# Patient Record
Sex: FEMALE | Race: WHITE | NOT HISPANIC OR LATINO | Employment: OTHER | URBAN - METROPOLITAN AREA
[De-identification: names, ages, dates, MRNs, and addresses within clinical notes are randomized per-mention and may not be internally consistent; named-entity substitution may affect disease eponyms.]

---

## 2023-03-15 DIAGNOSIS — R53.82 CHRONIC FATIGUE, UNSPECIFIED: ICD-10-CM

## 2023-03-15 DIAGNOSIS — R06.02 SHORTNESS OF BREATH: Primary | ICD-10-CM

## 2023-03-16 ENCOUNTER — OFFICE VISIT (OUTPATIENT)
Dept: CARDIOLOGY | Facility: CLINIC | Age: 73
End: 2023-03-16
Attending: INTERNAL MEDICINE
Payer: MEDICARE

## 2023-03-16 ENCOUNTER — LAB (OUTPATIENT)
Dept: LAB | Facility: CLINIC | Age: 73
End: 2023-03-16
Payer: MEDICARE

## 2023-03-16 VITALS
DIASTOLIC BLOOD PRESSURE: 73 MMHG | BODY MASS INDEX: 29.33 KG/M2 | OXYGEN SATURATION: 97 % | SYSTOLIC BLOOD PRESSURE: 126 MMHG | HEART RATE: 100 BPM | WEIGHT: 145.5 LBS | HEIGHT: 59 IN

## 2023-03-16 DIAGNOSIS — R06.02 SHORTNESS OF BREATH: ICD-10-CM

## 2023-03-16 DIAGNOSIS — R06.02 SOB (SHORTNESS OF BREATH): Primary | ICD-10-CM

## 2023-03-16 DIAGNOSIS — R93.1 DECREASED CARDIAC EJECTION FRACTION: ICD-10-CM

## 2023-03-16 DIAGNOSIS — R53.82 CHRONIC FATIGUE, UNSPECIFIED: ICD-10-CM

## 2023-03-16 LAB
ANION GAP SERPL CALCULATED.3IONS-SCNC: 12 MMOL/L (ref 7–15)
BI-PLANE LVEF ECHO: NORMAL
BUN SERPL-MCNC: 20.1 MG/DL (ref 8–23)
CALCIUM SERPL-MCNC: 10.1 MG/DL (ref 8.8–10.2)
CHLORIDE SERPL-SCNC: 107 MMOL/L (ref 98–107)
CREAT SERPL-MCNC: 0.89 MG/DL (ref 0.51–0.95)
DEPRECATED HCO3 PLAS-SCNC: 24 MMOL/L (ref 22–29)
ERYTHROCYTE [DISTWIDTH] IN BLOOD BY AUTOMATED COUNT: 14.7 % (ref 10–15)
GFR SERPL CREATININE-BSD FRML MDRD: 68 ML/MIN/1.73M2
GLUCOSE SERPL-MCNC: 147 MG/DL (ref 70–99)
HCT VFR BLD AUTO: 42.6 % (ref 35–47)
HGB BLD-MCNC: 13.4 G/DL (ref 11.7–15.7)
LVEF ECHO: NORMAL
MCH RBC QN AUTO: 30 PG (ref 26.5–33)
MCHC RBC AUTO-ENTMCNC: 31.5 G/DL (ref 31.5–36.5)
MCV RBC AUTO: 95 FL (ref 78–100)
NT-PROBNP SERPL-MCNC: 2850 PG/ML (ref 0–900)
PLATELET # BLD AUTO: 295 10E3/UL (ref 150–450)
POTASSIUM SERPL-SCNC: 4.5 MMOL/L (ref 3.4–5.3)
RBC # BLD AUTO: 4.47 10E6/UL (ref 3.8–5.2)
SODIUM SERPL-SCNC: 143 MMOL/L (ref 136–145)
TSH SERPL DL<=0.005 MIU/L-ACNC: 0.64 UIU/ML (ref 0.3–4.2)
WBC # BLD AUTO: 8.4 10E3/UL (ref 4–11)

## 2023-03-16 PROCEDURE — G0463 HOSPITAL OUTPT CLINIC VISIT: HCPCS | Mod: 25 | Performed by: INTERNAL MEDICINE

## 2023-03-16 PROCEDURE — 84443 ASSAY THYROID STIM HORMONE: CPT | Performed by: PATHOLOGY

## 2023-03-16 PROCEDURE — 83880 ASSAY OF NATRIURETIC PEPTIDE: CPT | Performed by: PATHOLOGY

## 2023-03-16 PROCEDURE — 93005 ELECTROCARDIOGRAM TRACING: CPT

## 2023-03-16 PROCEDURE — 36415 COLL VENOUS BLD VENIPUNCTURE: CPT | Performed by: PATHOLOGY

## 2023-03-16 PROCEDURE — 99207 PR STATISTIC IV PUSH SINGLE INITIAL SUBSTANCE: CPT | Mod: GC | Performed by: INTERNAL MEDICINE

## 2023-03-16 PROCEDURE — 85027 COMPLETE CBC AUTOMATED: CPT | Performed by: PATHOLOGY

## 2023-03-16 PROCEDURE — 93306 TTE W/DOPPLER COMPLETE: CPT | Mod: GC | Performed by: INTERNAL MEDICINE

## 2023-03-16 PROCEDURE — 80048 BASIC METABOLIC PNL TOTAL CA: CPT | Performed by: PATHOLOGY

## 2023-03-16 PROCEDURE — 99204 OFFICE O/P NEW MOD 45 MIN: CPT | Mod: 25 | Performed by: INTERNAL MEDICINE

## 2023-03-16 RX ORDER — ATORVASTATIN CALCIUM 10 MG/1
10 TABLET, FILM COATED ORAL DAILY
Qty: 90 TABLET | Refills: 3 | Status: SHIPPED | OUTPATIENT
Start: 2023-03-16 | End: 2023-08-29

## 2023-03-16 RX ORDER — FUROSEMIDE 20 MG
20 TABLET ORAL DAILY
Qty: 90 TABLET | Refills: 1 | Status: SHIPPED | OUTPATIENT
Start: 2023-03-16 | End: 2023-06-09

## 2023-03-16 RX ORDER — SODIUM CHLORIDE 9 MG/ML
INJECTION, SOLUTION INTRAVENOUS CONTINUOUS
Status: CANCELLED | OUTPATIENT
Start: 2023-03-16

## 2023-03-16 RX ORDER — LISINOPRIL 2.5 MG/1
2.5 TABLET ORAL DAILY
Qty: 90 TABLET | Refills: 3 | Status: SHIPPED | OUTPATIENT
Start: 2023-03-16 | End: 2023-04-05

## 2023-03-16 RX ORDER — POTASSIUM CHLORIDE 1500 MG/1
40 TABLET, EXTENDED RELEASE ORAL
Status: CANCELLED | OUTPATIENT
Start: 2023-03-16

## 2023-03-16 RX ORDER — CALCIUM CARBONATE/VITAMIN D3 500 MG-10
TABLET ORAL
COMMUNITY

## 2023-03-16 RX ORDER — ASPIRIN 81 MG/1
TABLET, CHEWABLE ORAL
COMMUNITY
Start: 2023-03-10

## 2023-03-16 RX ORDER — METOPROLOL SUCCINATE 50 MG/1
25 TABLET, EXTENDED RELEASE ORAL
COMMUNITY
Start: 2023-03-13 | End: 2023-07-11 | Stop reason: DRUGHIGH

## 2023-03-16 RX ORDER — ASPIRIN 81 MG/1
243 TABLET, CHEWABLE ORAL ONCE
Status: CANCELLED | OUTPATIENT
Start: 2023-03-16

## 2023-03-16 RX ORDER — ASPIRIN 325 MG
325 TABLET ORAL ONCE
Status: CANCELLED | OUTPATIENT
Start: 2023-03-16 | End: 2023-03-16

## 2023-03-16 RX ORDER — LIDOCAINE 40 MG/G
CREAM TOPICAL
Status: CANCELLED | OUTPATIENT
Start: 2023-03-16

## 2023-03-16 RX ORDER — POTASSIUM CHLORIDE 1500 MG/1
20 TABLET, EXTENDED RELEASE ORAL
Status: CANCELLED | OUTPATIENT
Start: 2023-03-16

## 2023-03-16 RX ADMIN — Medication 5 ML: at 13:44

## 2023-03-16 ASSESSMENT — PAIN SCALES - GENERAL: PAINLEVEL: NO PAIN (0)

## 2023-03-16 NOTE — PROGRESS NOTES
CARDIOLOGY NEW OFFICE VISIT    HPI: Kayli Morillo is a 72 year old female being seen today for evaluation of new onset heart failure.   The patient's risk factor profile is: (-) HTN, (+) DM, (-) hypercholesterolemia, (-) tobacco use, (-) fam Hx premature CAD.  The patient has no history of cardiovascular disease (CAD, CHF, arrhythmia, valvular heart disease).  The patient has no Hx of PAD or cerebrovascular disease.  The patient has not undergone prior cardiovascular evaluation and has never had an ECHO, stress study, cardiac catheterization, or EP study.   She has been declining over a period of time and cannot attribute her current symptoms to a recent acute event. She reports that potentially in the last few weeks her symptoms have worsened. She describes orthopnea, paroxysmal nocturnal dyspnea, and dyspnea on exertion with just 1.5 blocks of ambulation. She was seen in a local hospital where she lives and was told that she has heart failure; she was started on metoprolol and Jardiance. Today she reports that her symptoms have not improved and she feels about the same as she did at the start of her medications. Clinically, she appears well compensated from her heart failure standpoint. She is slightly hypervolemic on exam.     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     The patient denies a history of chest discomfort, dyspnea, PND, orthopnea, pedal edema, palpitations, lightheadedness, and syncope.      PAST MEDICAL HISTORY:  No past medical history on file.    CURRENT MEDICATIONS:  Current Outpatient Medications   Medication Sig Dispense Refill     aspirin (ASA) 81 MG chewable tablet        Calcium Carb-Cholecalciferol 500-10 MG-MCG TABS        empagliflozin (JARDIANCE) 10 MG TABS tablet        metFORMIN (GLUCOPHAGE)  "500 MG tablet 2 times daily (with meals)       metoprolol succinate ER (TOPROL XL) 50 MG 24 hr tablet Take 25 mg by mouth       Multiple Vitamins-Minerals (ICAPS AREDS 2 PO)          PAST SURGICAL HISTORY:  No past surgical history on file.    ALLERGIES  Patient has no known allergies.    FAMILY HX:  No family history on file.    SOCIAL HX:  Social History     Socioeconomic History     Marital status:      Spouse name: None     Number of children: None     Years of education: None     Highest education level: None   Tobacco Use     Smoking status: Never     Passive exposure: Past     Smokeless tobacco: Never       ROS:  Constitutional: No fever, chills, or sweats. No weight gain/loss.   ENT: No visual disturbance, ear ache, epistaxis, sore throat.   Allergies/Immunologic: Negative.   Respiratory: No cough, hemoptysis.   Cardiovascular: As per HPI.   GI: No nausea, vomiting, hematemesis, melena, or hematochezia.   : No urinary frequency, dysuria, or hematuria.   Integument: Negative.   Psychiatric: Negative.   Neuro: Negative.   Endocrinology: Negative.   Musculoskeletal: No myalgia.    VITAL SIGNS:  /73 (BP Location: Right arm, Patient Position: Sitting, Cuff Size: Adult Regular)   Pulse 100   Ht 1.5 m (4' 11.06\")   Wt 66 kg (145 lb 8 oz)   SpO2 97%   BMI 29.33 kg/m    Body mass index is 29.33 kg/m .  Wt Readings from Last 2 Encounters:   03/16/23 66 kg (145 lb 8 oz)       PHYSICAL EXAM  Kayli Morillo is a 72 year old female in no acute distress.  HEENT: Unremarkable.  Neck: JVP elevated at 10 cm.  Carotids +4/4 bilaterally without bruits.  Lungs: CTA.  Cor: regular, tachycardic. Normal S1 and S2.  No murmur, rub, or gallop.  PMI in Lf 5th ICS.  Abd: Soft, nontender, nondistended.  NABS.  No pulsatile mass.  Extremities: 1+ pitting edema bilateral.  Pulses +4/4 symmetric in upper and lower extremities.  Neuro: Grossly intact.    LABS    Lab Results   Component Value Date    WBC 8.4 03/16/2023 "     Lab Results   Component Value Date    RBC 4.47 03/16/2023     Lab Results   Component Value Date    HGB 13.4 03/16/2023     Lab Results   Component Value Date    HCT 42.6 03/16/2023     No components found for: MCT  Lab Results   Component Value Date    MCV 95 03/16/2023     Lab Results   Component Value Date    MCH 30.0 03/16/2023     Lab Results   Component Value Date    MCHC 31.5 03/16/2023     Lab Results   Component Value Date    RDW 14.7 03/16/2023     Lab Results   Component Value Date     03/16/2023      Recent Labs   Lab Test 03/16/23  1143      POTASSIUM 4.5   CHLORIDE 107   CO2 24   ANIONGAP 12   *   BUN 20.1   CR 0.89   JULIANNE 10.1     No results for input(s): CHOL, HDL, LDL, TRIG, CHOLHDLRATIO, NHDL in the last 74027 hours.     EKG:  3/16/23  Sinus tachycardia  LBBB    ECHO: 3/16/23  Moderate left ventricular dilation (LVIDd 6.1cm). Severe diffuse hypokinesis.  Biplane LVEF is 14.3%.  The right ventricle is normal size and function.  Moderate mitral insufficiency is present due to dilated left ventricle.  Moderate pulmonary hypertension. Right ventricular systolic pressure is 52  mmHg.  The inferior vena cava was normal in size with preserved respiratory  variability.    STRESS TEST:  --    CARDIAC CATH:  --    ASSESSMENT AND PLAN:    1. Chronic systolic heart failure, NYHA III, Stage C  -- continue BB, Jardiance, initiate low dose ACEI 2.5 mg lisinopril  -- start 20 mg PO Lasix  -- repeat BMP in 1 week  -- RHC / coronary angiogram  -- likely cardiac MRI after cardiac catheterization  -- likely CRTD  -- CORE referral / heart failure referrals    2. Hyperlipidemia, at goal  -- continue statin    RTC after catheterization    Pastor Quarles MD

## 2023-03-16 NOTE — PATIENT INSTRUCTIONS
Patient Instructions:  It was a pleasure to see you in the cardiology clinic today.      If you have any questions, call  Mary Jo Wynn RN, at [phone].   [facility].  To schedule an appointment or to leave a message for your Care Team Press #1  If you are a physician calling for another physician Press #2  For Billing Press #3  For Medical Records Press #4  We are encouraging the use of MyChart to communicate with your HealthCare Provider    Note the new medications: Start Lipitor 10 mg by mouth everyday  Start Lasix 20 mg by mouth everyday for one week, then check a basic metabolic panel (lab)  Lisinopril 2.5 mg by mouth everyday    Stop the following medications: none    The results from today include: pending coronary angiogram     Pre-procedure instructions - Coronary Angiogram  Patient Education    Your arrival time is 12:30 pm on Friday, March 31st.  Location is [address] Waiting Room  Please plan on being at the hospital all day.  At any time, emergencies and/or urgent cases may come up which could delay the start of your procedure.    Pre-procedure instructions - Coronary Angiogram  Shower in the evening before or the morning of the procedure  Nothing to eat or drink after midnight  You can take your morning medications (except for diabetic and blood thinners) with sips of water.  Take 325 mg of Asprin 24 hours prior to the procedure and the morning of procedure.   You will need to arrange a ride to drop you off and pick you up, as you will be unable to drive home.  Prior to discharge you may be required to lay flat for approximately 2-4 hours in the recovery unit to ensure proper clotting of the artery.              Diabetic Medication Instructions  Hold oral diabetic medication in morning of your procedure and for 48 hours after IV contrast is given  Typical instructions for insulin  diabetic medication holding are below. However, please reach out to your Primary Care Provider or Endocrinologist for specific instructions  DO NOT take any oral diabetic medication, short-acting diabetes medications/insulin, humalog or regular insulin the morning of your test  Take   dose of long-acting insulin (Lantus, Levemir) the day of your test  Remember to bring your glucometer and insulin

## 2023-03-16 NOTE — NURSING NOTE
Chief Complaint   Patient presents with     New Patient     shortness of breath          Vitals were taken, medications reconciled and EKG performed.     Sohan Whitley, EMT   12:15 PM

## 2023-03-16 NOTE — LETTER
3/16/2023      RE: Kayli Morillo  [address] [facility] [address]       Dear Colleague,    Thank you for the opportunity to participate in the care of your patient, Kayli Morillo, at the [facility] [place] [facility]. Please see a copy of my visit note below.    CARDIOLOGY NEW OFFICE VISIT    HPI: Kayli Morillo is a 72 year old female being seen today for evaluation of new onset heart failure.   The patient's risk factor profile is: (-) HTN, (+) DM, (-) hypercholesterolemia, (-) tobacco use, (-) fam Hx premature CAD.  The patient has no history of cardiovascular disease (CAD, CHF, arrhythmia, valvular heart disease).  The patient has no Hx of PAD or cerebrovascular disease.  The patient has not undergone prior cardiovascular evaluation and has never had an ECHO, stress study, cardiac catheterization, or EP study.   She has been declining over a period of time and cannot attribute her current symptoms to a recent acute event. She reports that potentially in the last few weeks her symptoms have worsened. She describes orthopnea, paroxysmal nocturnal dyspnea, and dyspnea on exertion with just 1.5 blocks of ambulation. She was seen in a local hospital where she lives and was told that she has heart failure; she was started on metoprolol and Jardiance. Today she reports that her symptoms have not improved and she feels about the same as she did at the start of her medications. Clinically, she appears well compensated from her heart failure standpoint. She is slightly hypervolemic on exam.     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     The patient denies a history of chest discomfort, dyspnea, PND, orthopnea, pedal edema, palpitations,  "lightheadedness, and syncope.      PAST MEDICAL HISTORY:  No past medical history on file.    CURRENT MEDICATIONS:  Current Outpatient Medications   Medication Sig Dispense Refill     aspirin (ASA) 81 MG chewable tablet        Calcium Carb-Cholecalciferol 500-10 MG-MCG TABS        empagliflozin (JARDIANCE) 10 MG TABS tablet        metFORMIN (GLUCOPHAGE) 500 MG tablet 2 times daily (with meals)       metoprolol succinate ER (TOPROL XL) 50 MG 24 hr tablet Take 25 mg by mouth       Multiple Vitamins-Minerals (ICAPS AREDS 2 PO)          PAST SURGICAL HISTORY:  No past surgical history on file.    ALLERGIES  Patient has no known allergies.    FAMILY HX:  No family history on file.    SOCIAL HX:  Social History     Socioeconomic History     Marital status:      Spouse name: None     Number of children: None     Years of education: None     Highest education level: None   Tobacco Use     Smoking status: Never     Passive exposure: Past     Smokeless tobacco: Never       ROS:  Constitutional: No fever, chills, or sweats. No weight gain/loss.   ENT: No visual disturbance, ear ache, epistaxis, sore throat.   Allergies/Immunologic: Negative.   Respiratory: No cough, hemoptysis.   Cardiovascular: As per HPI.   GI: No nausea, vomiting, hematemesis, melena, or hematochezia.   : No urinary frequency, dysuria, or hematuria.   Integument: Negative.   Psychiatric: Negative.   Neuro: Negative.   Endocrinology: Negative.   Musculoskeletal: No myalgia.    VITAL SIGNS:  /73 (BP Location: Right arm, Patient Position: Sitting, Cuff Size: Adult Regular)   Pulse 100   Ht 1.5 m (4' 11.06\")   Wt 66 kg (145 lb 8 oz)   SpO2 97%   BMI 29.33 kg/m    Body mass index is 29.33 kg/m .  Wt Readings from Last 2 Encounters:   03/16/23 66 kg (145 lb 8 oz)       PHYSICAL EXAM  Kayli Morillo is a 72 year old female in no acute distress.  HEENT: Unremarkable.  Neck: JVP elevated at 10 cm.  Carotids +4/4 bilaterally without bruits.  " Lungs: CTA.  Cor: regular, tachycardic. Normal S1 and S2.  No murmur, rub, or gallop.  PMI in Lf 5th ICS.  Abd: Soft, nontender, nondistended.  NABS.  No pulsatile mass.  Extremities: 1+ pitting edema bilateral.  Pulses +4/4 symmetric in upper and lower extremities.  Neuro: Grossly intact.    LABS    Lab Results   Component Value Date    WBC 8.4 03/16/2023     Lab Results   Component Value Date    RBC 4.47 03/16/2023     Lab Results   Component Value Date    HGB 13.4 03/16/2023     Lab Results   Component Value Date    HCT 42.6 03/16/2023     No components found for: MCT  Lab Results   Component Value Date    MCV 95 03/16/2023     Lab Results   Component Value Date    MCH 30.0 03/16/2023     Lab Results   Component Value Date    MCHC 31.5 03/16/2023     Lab Results   Component Value Date    RDW 14.7 03/16/2023     Lab Results   Component Value Date     03/16/2023      Recent Labs   Lab Test 03/16/23  1143      POTASSIUM 4.5   CHLORIDE 107   CO2 24   ANIONGAP 12   *   BUN 20.1   CR 0.89   JULIANNE 10.1     No results for input(s): CHOL, HDL, LDL, TRIG, CHOLHDLRATIO, NHDL in the last 02308 hours.     EKG:  3/16/23  Sinus tachycardia  LBBB    ECHO: 3/16/23  Moderate left ventricular dilation (LVIDd 6.1cm). Severe diffuse hypokinesis.  Biplane LVEF is 14.3%.  The right ventricle is normal size and function.  Moderate mitral insufficiency is present due to dilated left ventricle.  Moderate pulmonary hypertension. Right ventricular systolic pressure is 52  mmHg.  The inferior vena cava was normal in size with preserved respiratory  variability.    STRESS TEST:  --    CARDIAC CATH:  --    ASSESSMENT AND PLAN:    1. Chronic systolic heart failure, NYHA III, Stage C  -- continue BB, Jardiance, initiate low dose ACEI 2.5 mg lisinopril  -- start 20 mg PO Lasix  -- repeat BMP in 1 week  -- RHC / coronary angiogram  -- likely cardiac MRI after cardiac catheterization  -- likely CRTD  -- CORE referral / heart  failure referrals    2. Hyperlipidemia, at goal  -- continue statin    RTC after catheterization    Pastor Quarles MD

## 2023-03-19 LAB
ATRIAL RATE - MUSE: 105 BPM
DIASTOLIC BLOOD PRESSURE - MUSE: NORMAL MMHG
INTERPRETATION ECG - MUSE: NORMAL
P AXIS - MUSE: 68 DEGREES
PR INTERVAL - MUSE: 146 MS
QRS DURATION - MUSE: 128 MS
QT - MUSE: 370 MS
QTC - MUSE: 489 MS
R AXIS - MUSE: -50 DEGREES
SYSTOLIC BLOOD PRESSURE - MUSE: NORMAL MMHG
T AXIS - MUSE: 83 DEGREES
VENTRICULAR RATE- MUSE: 105 BPM

## 2023-03-23 ENCOUNTER — TELEPHONE (OUTPATIENT)
Dept: CARDIOLOGY | Facility: CLINIC | Age: 73
End: 2023-03-23
Payer: MEDICARE

## 2023-03-23 NOTE — TELEPHONE ENCOUNTER
Encouraged patient to have labs tomorrow to see if lasix has affected her kidney function and electrolytes. She states that her weight is down to 139 pounds. When we saw her it had been at 145 pounds.

## 2023-03-23 NOTE — TELEPHONE ENCOUNTER
Health Call Center    Phone Message    May a detailed message be left on voicemail: yes     Reason for Call: Other: Pt states she is taking a medication that requires her to have LABS done a week later which would lead up to needing LABS today, pt has LAB appt scheduled for 3/31 and wants to know if she should want until that appt or do she need to get work done before that one? Please reach out to pt at [phone] to discuss.     Action Taken: Other: Cardiology    Travel Screening: Not Applicable     Thank you!  [facility]

## 2023-03-27 ENCOUNTER — TELEPHONE (OUTPATIENT)
Dept: CARDIOLOGY | Facility: CLINIC | Age: 73
End: 2023-03-27
Payer: MEDICARE

## 2023-03-27 NOTE — CONFIDENTIAL NOTE
Returned call and let patient know she should stay on all her medications, will not be stopping the lipirot , lisinopril, aspirin, or metoprolol.

## 2023-03-27 NOTE — TELEPHONE ENCOUNTER
Health Call Center    Phone Message    May a detailed message be left on voicemail: yes     Reason for Call: Other: Kayli called requesting to speak to Camille. Kayli chatted with Camille this morning regarding taking her Lasix prior to her procedure but forgot to ask about the other medications she is taking. Please reach out to Kayli to discuss her taking her other medications as she prepares for her procedure. Thank you!     Action Taken: Other: Cardiology    Travel Screening: Not Applicable     Thank you!  [facility]

## 2023-03-30 ENCOUNTER — TELEPHONE (OUTPATIENT)
Dept: CARDIOLOGY | Facility: CLINIC | Age: 73
End: 2023-03-30
Payer: MEDICARE

## 2023-03-30 NOTE — TELEPHONE ENCOUNTER
Left voicemail to remind patient of Cardiac Cath Lab appointment on 3/31 and inform patient of updated Visitor Policy, need for  and that someone needs to stay with pt after.

## 2023-03-31 ENCOUNTER — HOSPITAL ENCOUNTER (OUTPATIENT)
Facility: CLINIC | Age: 73
Discharge: HOME | End: 2023-03-31
Attending: INTERNAL MEDICINE | Admitting: INTERNAL MEDICINE
Payer: MEDICARE

## 2023-03-31 ENCOUNTER — APPOINTMENT (OUTPATIENT)
Dept: LAB | Facility: CLINIC | Age: 73
End: 2023-03-31
Attending: INTERNAL MEDICINE
Payer: MEDICARE

## 2023-03-31 ENCOUNTER — APPOINTMENT (OUTPATIENT)
Dept: MEDSURG UNIT | Facility: CLINIC | Age: 73
End: 2023-03-31
Attending: INTERNAL MEDICINE
Payer: MEDICARE

## 2023-03-31 VITALS
HEART RATE: 82 BPM | WEIGHT: 138.6 LBS | TEMPERATURE: 97.6 F | RESPIRATION RATE: 16 BRPM | SYSTOLIC BLOOD PRESSURE: 109 MMHG | DIASTOLIC BLOOD PRESSURE: 57 MMHG | BODY MASS INDEX: 27.94 KG/M2 | OXYGEN SATURATION: 98 %

## 2023-03-31 DIAGNOSIS — R06.02 SOB (SHORTNESS OF BREATH): ICD-10-CM

## 2023-03-31 DIAGNOSIS — R93.1 DECREASED CARDIAC EJECTION FRACTION: ICD-10-CM

## 2023-03-31 PROBLEM — Z98.890 STATUS POST CORONARY ANGIOGRAM: Status: ACTIVE | Noted: 2023-03-31

## 2023-03-31 LAB
ANION GAP SERPL CALCULATED.3IONS-SCNC: 12 MMOL/L (ref 7–15)
APTT PPP: 29 SECONDS (ref 22–38)
BUN SERPL-MCNC: 31.6 MG/DL (ref 8–23)
CALCIUM SERPL-MCNC: 9.7 MG/DL (ref 8.8–10.2)
CHLORIDE SERPL-SCNC: 102 MMOL/L (ref 98–107)
CREAT SERPL-MCNC: 1.04 MG/DL (ref 0.51–0.95)
DEPRECATED HCO3 PLAS-SCNC: 27 MMOL/L (ref 22–29)
ERYTHROCYTE [DISTWIDTH] IN BLOOD BY AUTOMATED COUNT: 13.6 % (ref 10–15)
GFR SERPL CREATININE-BSD FRML MDRD: 56 ML/MIN/1.73M2
GLUCOSE BLDC GLUCOMTR-MCNC: 111 MG/DL (ref 70–99)
GLUCOSE SERPL-MCNC: 148 MG/DL (ref 70–99)
HCT VFR BLD AUTO: 46.9 % (ref 35–47)
HGB BLD-MCNC: 13.8 G/DL (ref 11.7–15.7)
HGB BLD-MCNC: 14.5 G/DL (ref 11.7–15.7)
INR PPP: 1.01 (ref 0.85–1.15)
MCH RBC QN AUTO: 29.2 PG (ref 26.5–33)
MCHC RBC AUTO-ENTMCNC: 30.9 G/DL (ref 31.5–36.5)
MCV RBC AUTO: 95 FL (ref 78–100)
OXYHGB MFR BLDV: 62 % (ref 92–100)
PLATELET # BLD AUTO: 254 10E3/UL (ref 150–450)
POTASSIUM SERPL-SCNC: 4.4 MMOL/L (ref 3.4–5.3)
RBC # BLD AUTO: 4.96 10E6/UL (ref 3.8–5.2)
SODIUM SERPL-SCNC: 141 MMOL/L (ref 136–145)
WBC # BLD AUTO: 8.2 10E3/UL (ref 4–11)

## 2023-03-31 PROCEDURE — 85027 COMPLETE CBC AUTOMATED: CPT | Performed by: INTERNAL MEDICINE

## 2023-03-31 PROCEDURE — 250N000011 HC RX IP 250 OP 636: Performed by: INTERNAL MEDICINE

## 2023-03-31 PROCEDURE — 999N000134 HC STATISTIC PP CARE STAGE 3

## 2023-03-31 PROCEDURE — 36415 COLL VENOUS BLD VENIPUNCTURE: CPT | Performed by: INTERNAL MEDICINE

## 2023-03-31 PROCEDURE — C1894 INTRO/SHEATH, NON-LASER: HCPCS | Performed by: INTERNAL MEDICINE

## 2023-03-31 PROCEDURE — 93456 R HRT CORONARY ARTERY ANGIO: CPT | Mod: 26 | Performed by: INTERNAL MEDICINE

## 2023-03-31 PROCEDURE — 250N000013 HC RX MED GY IP 250 OP 250 PS 637: Performed by: INTERNAL MEDICINE

## 2023-03-31 PROCEDURE — C1887 CATHETER, GUIDING: HCPCS | Performed by: INTERNAL MEDICINE

## 2023-03-31 PROCEDURE — 999N000142 HC STATISTIC PROCEDURE PREP ONLY

## 2023-03-31 PROCEDURE — 85018 HEMOGLOBIN: CPT

## 2023-03-31 PROCEDURE — 85610 PROTHROMBIN TIME: CPT | Performed by: INTERNAL MEDICINE

## 2023-03-31 PROCEDURE — 93456 R HRT CORONARY ARTERY ANGIO: CPT | Performed by: INTERNAL MEDICINE

## 2023-03-31 PROCEDURE — 93005 ELECTROCARDIOGRAM TRACING: CPT

## 2023-03-31 PROCEDURE — 85730 THROMBOPLASTIN TIME PARTIAL: CPT | Performed by: INTERNAL MEDICINE

## 2023-03-31 PROCEDURE — 82962 GLUCOSE BLOOD TEST: CPT

## 2023-03-31 PROCEDURE — 999N000054 HC STATISTIC EKG NON-CHARGEABLE

## 2023-03-31 PROCEDURE — 99152 MOD SED SAME PHYS/QHP 5/>YRS: CPT | Performed by: INTERNAL MEDICINE

## 2023-03-31 PROCEDURE — 93010 ELECTROCARDIOGRAM REPORT: CPT | Performed by: INTERNAL MEDICINE

## 2023-03-31 PROCEDURE — 258N000003 HC RX IP 258 OP 636: Performed by: INTERNAL MEDICINE

## 2023-03-31 PROCEDURE — 99152 MOD SED SAME PHYS/QHP 5/>YRS: CPT | Mod: GC | Performed by: INTERNAL MEDICINE

## 2023-03-31 PROCEDURE — 272N000001 HC OR GENERAL SUPPLY STERILE: Performed by: INTERNAL MEDICINE

## 2023-03-31 PROCEDURE — 250N000009 HC RX 250: Performed by: INTERNAL MEDICINE

## 2023-03-31 PROCEDURE — 82810 BLOOD GASES O2 SAT ONLY: CPT

## 2023-03-31 PROCEDURE — 80048 BASIC METABOLIC PNL TOTAL CA: CPT | Performed by: INTERNAL MEDICINE

## 2023-03-31 RX ORDER — ASPIRIN 81 MG/1
243 TABLET, CHEWABLE ORAL ONCE
Status: COMPLETED | OUTPATIENT
Start: 2023-03-31 | End: 2023-03-31

## 2023-03-31 RX ORDER — NALOXONE HYDROCHLORIDE 0.4 MG/ML
0.2 INJECTION, SOLUTION INTRAMUSCULAR; INTRAVENOUS; SUBCUTANEOUS
Status: DISCONTINUED | OUTPATIENT
Start: 2023-03-31 | End: 2023-03-31 | Stop reason: HOSPADM

## 2023-03-31 RX ORDER — HEPARIN SODIUM 1000 [USP'U]/ML
INJECTION, SOLUTION INTRAVENOUS; SUBCUTANEOUS
Status: DISCONTINUED | OUTPATIENT
Start: 2023-03-31 | End: 2023-03-31 | Stop reason: HOSPADM

## 2023-03-31 RX ORDER — NALOXONE HYDROCHLORIDE 0.4 MG/ML
0.4 INJECTION, SOLUTION INTRAMUSCULAR; INTRAVENOUS; SUBCUTANEOUS
Status: DISCONTINUED | OUTPATIENT
Start: 2023-03-31 | End: 2023-03-31 | Stop reason: HOSPADM

## 2023-03-31 RX ORDER — POTASSIUM CHLORIDE 750 MG/1
20 TABLET, EXTENDED RELEASE ORAL
Status: DISCONTINUED | OUTPATIENT
Start: 2023-03-31 | End: 2023-03-31 | Stop reason: HOSPADM

## 2023-03-31 RX ORDER — FENTANYL CITRATE 50 UG/ML
25 INJECTION, SOLUTION INTRAMUSCULAR; INTRAVENOUS
Status: DISCONTINUED | OUTPATIENT
Start: 2023-03-31 | End: 2023-03-31 | Stop reason: HOSPADM

## 2023-03-31 RX ORDER — IOPAMIDOL 755 MG/ML
INJECTION, SOLUTION INTRAVASCULAR
Status: DISCONTINUED | OUTPATIENT
Start: 2023-03-31 | End: 2023-03-31 | Stop reason: HOSPADM

## 2023-03-31 RX ORDER — ASPIRIN 325 MG
325 TABLET ORAL ONCE
Status: COMPLETED | OUTPATIENT
Start: 2023-03-31 | End: 2023-03-31

## 2023-03-31 RX ORDER — SODIUM CHLORIDE 9 MG/ML
INJECTION, SOLUTION INTRAVENOUS CONTINUOUS
Status: DISCONTINUED | OUTPATIENT
Start: 2023-03-31 | End: 2023-03-31 | Stop reason: HOSPADM

## 2023-03-31 RX ORDER — FENTANYL CITRATE 50 UG/ML
INJECTION, SOLUTION INTRAMUSCULAR; INTRAVENOUS
Status: DISCONTINUED | OUTPATIENT
Start: 2023-03-31 | End: 2023-03-31 | Stop reason: HOSPADM

## 2023-03-31 RX ORDER — LIDOCAINE 40 MG/G
CREAM TOPICAL
Status: DISCONTINUED | OUTPATIENT
Start: 2023-03-31 | End: 2023-03-31 | Stop reason: HOSPADM

## 2023-03-31 RX ORDER — POTASSIUM CHLORIDE 750 MG/1
40 TABLET, EXTENDED RELEASE ORAL
Status: DISCONTINUED | OUTPATIENT
Start: 2023-03-31 | End: 2023-03-31 | Stop reason: HOSPADM

## 2023-03-31 RX ORDER — ATROPINE SULFATE 0.1 MG/ML
0.5 INJECTION INTRAVENOUS
Status: DISCONTINUED | OUTPATIENT
Start: 2023-03-31 | End: 2023-03-31 | Stop reason: HOSPADM

## 2023-03-31 RX ORDER — FLUMAZENIL 0.1 MG/ML
0.2 INJECTION, SOLUTION INTRAVENOUS
Status: DISCONTINUED | OUTPATIENT
Start: 2023-03-31 | End: 2023-03-31 | Stop reason: HOSPADM

## 2023-03-31 RX ORDER — ACETAMINOPHEN 325 MG/1
650 TABLET ORAL EVERY 4 HOURS PRN
Status: DISCONTINUED | OUTPATIENT
Start: 2023-03-31 | End: 2023-03-31 | Stop reason: HOSPADM

## 2023-03-31 RX ADMIN — SODIUM CHLORIDE: 9 INJECTION, SOLUTION INTRAVENOUS at 13:40

## 2023-03-31 RX ADMIN — ASPIRIN 81 MG 243 MG: 81 TABLET ORAL at 13:17

## 2023-03-31 ASSESSMENT — ACTIVITIES OF DAILY LIVING (ADL)
ADLS_ACUITY_SCORE: 35

## 2023-03-31 NOTE — PROGRESS NOTES
D/I/A: Pt roomed on 3C in bay 31.  Arrived via litter and accompanied by CL RN. On/Off: Off monitor.  VSSA.  Rhythm upon arrival SR on monitor.  Denies pain or sob.  Reviewed activity restrictions and when to notify RN, ie-changes to breathing or increased chest pressure or chest pain.  CCL access:  R TR band with 12 ml of air.  P: Continue to monitor status.  Discharge to home once meeting criteria.

## 2023-03-31 NOTE — Clinical Note
Called to Ak[facility] BORGES on 3C. All questions answered. All belongings sent with patient. Patient transported to 3C via stretcher with SHAKIR RN.

## 2023-03-31 NOTE — Clinical Note
dry, intact, no bleeding and no hematoma. 6 Fr sheath removed from the right radial artery. TR band placed. See flowsheet for details.

## 2023-03-31 NOTE — PROGRESS NOTES
Arrived to Unit 2a for CORS/RHC procedure in CCL. AFVSS. Denies pain. Labs resulted. Contrast reviewed. Bilat pp & pt pulses +1; marked. ECG ordered. Pt stable; ready for consent. Pt's spouse, Octavio, [phone] at bedside.

## 2023-03-31 NOTE — DISCHARGE INSTRUCTIONS
Going Home after an Angiogram (Cardiac)  ______________________________________________      After you go home:  Have an adult stay with you for 24 hours.  Drink plenty of fluids.  You may eat your normal diet, unless your doctor tells you otherwise.  For 24 hours:  Relax and take it easy.  Do NOT smoke.  Do NOT make any important or legal decisions.  Do NOT drive or operate machines at home or at work.  Do NOT drink alcohol.  Remove the Band-Aid after 24 hours. If there is minor oozing, apply another Band-aid and remove it after 12 hours.  For 2 days, do NOT have sex or do any heavy exercise.  Do NOT take a bath, or use a hot tub or pool for at least 3 days. You may shower.    Care of wrist or arm site  It is normal to have soreness at the puncture site and mild tingling in your hand for up to 3 days.  For 2 days, do not use your hand or arm to support your weight (such as rising from a chair) or bend your wrist (such as lifting a garage door).  For 2 days, do not lift more than 5 pounds or exercise your arm (tennis, golf or bowling).    If you start bleeding from the site in your arm:  Sit down and press firmly on the site with your fingers for 10 minutes. Call your doctor as soon as you can.  If the bleeding stops, sit still and keep your wrist straight for 2 hours.    Medicines  If you have started taking Plavix or Effient, do not stop taking it until you talk to your heart doctor (cardiologist).  If you are on metformin (Glucophage), do not restart it until you have blood tests (within 2 to 3 days after discharge). When your doctor tells you it is safe, you may restart the metformin.  If you have stopped any other medicines, check with your nurse or provider about when to restart them.    Call 911 right away if you have bleeding that is heavy or does not stop.    Call your doctor if:  You have a large or growing hard lump around the site.  The site is red, swollen, hot or tender.  Blood or fluid is draining  from the site.  You have chills or a fever greater than 101 F (38 C).  Your leg or arm feels numb or cool.  You have hives, a rash or unusual itching.      [facility] [place]:  [phone] (7 days a week)

## 2023-04-03 ENCOUNTER — TELEPHONE (OUTPATIENT)
Dept: CARDIOLOGY | Facility: CLINIC | Age: 73
End: 2023-04-03
Payer: MEDICARE

## 2023-04-03 DIAGNOSIS — I50.9 HEART FAILURE, UNSPECIFIED (H): ICD-10-CM

## 2023-04-03 DIAGNOSIS — I42.8 NON-ISCHEMIC CARDIOMYOPATHY (H): Primary | ICD-10-CM

## 2023-04-03 DIAGNOSIS — I50.89 OTHER HEART FAILURE (H): ICD-10-CM

## 2023-04-03 DIAGNOSIS — R93.1 DECREASED CARDIAC EJECTION FRACTION: ICD-10-CM

## 2023-04-03 DIAGNOSIS — R93.1 DECREASED CARDIAC EJECTION FRACTION: Primary | ICD-10-CM

## 2023-04-03 DIAGNOSIS — I42.8 NON-ISCHEMIC CARDIOMYOPATHY (H): ICD-10-CM

## 2023-04-03 LAB
ATRIAL RATE - MUSE: 73 BPM
DIASTOLIC BLOOD PRESSURE - MUSE: NORMAL MMHG
INTERPRETATION ECG - MUSE: NORMAL
P AXIS - MUSE: 65 DEGREES
PR INTERVAL - MUSE: 150 MS
QRS DURATION - MUSE: 124 MS
QT - MUSE: 436 MS
QTC - MUSE: 480 MS
R AXIS - MUSE: -49 DEGREES
SYSTOLIC BLOOD PRESSURE - MUSE: NORMAL MMHG
T AXIS - MUSE: 5 DEGREES
VENTRICULAR RATE- MUSE: 73 BPM

## 2023-04-03 NOTE — TELEPHONE ENCOUNTER
----- Message from Pastor Quarles MD sent at 3/31/2023  8:37 PM CDT -----  So she has only mild non obstructive CAD. This is all non ischemic cardiomyopathy.  Lets schedule her for a cardiac MRI.  I discussed her case with Albaro Schilling, and she will follow up with him. Will you help get her set up with him?  Her creatinine has started to increase, and she is dry, so I told them to stop the Lasix and monitor weights, resume if increase > 3lbs.  CORE referral for her as well for diuretic dosing as well as titration of her BB and ACEI  If at 6-9 months repeat echo shows no improvement in EF, we will need to refer her to EP for CRT-D consideration.

## 2023-04-03 NOTE — CONFIDENTIAL NOTE
monthdS-(situation): patient discharged from hospital post angiography for newly reduced EF.     Right sided filling pressures are normal.    Left sided filling pressures are normal.    Mild elevated pulmonary hypertension.    Normal cardiac output level.    Hemodynamic data has been modified in Epic per physician review.     Mild to moderate non obstructive CAD.  Non ischemic cardiomyopathy.    Right radial artery used for access and she states the site is flat and dry. Reviewed activity instructions.   Her lasix was discontinued at discharge.    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    A-(assessment): non obstructive disease    R-(recommendations): follow up with CORE, cardiac MRI.  Call cardiology if weight increased > 3 pounds in one day.   Repeat an ECHO in 6-9 months, if no improvement refer to EP for CRT-D consideration.

## 2023-04-03 NOTE — TELEPHONE ENCOUNTER
----- Message from Bhavesh Herrera RN sent at 4/3/2023  2:33 PM CDT -----  Regarding: FW: referral  Oops, I meant to send this to our pool.    ----- Message -----  From: CohenLo lee  Sent: 4/3/2023   2:26 PM CDT  To: Bhavesh Herrera RN  Subject: RE: referral                                     She would like the [facility], just checking  - Thank you     ----- Message -----  From: Bhavesh Herrera RN  Sent: 4/3/2023   1:22 PM CDT  To: [facility]  Subject: FW: referral                                     Hi there,  Can you please call patient and schedule for Enroll CORE in a couple of weeks.  Thanks,  Bhavesh  ----- Message -----  From: Nichole Wynn RN  Sent: 4/3/2023  11:33 AM CDT  To: Cardiology Core ; #  Subject: referral                                         Hi  Referral from Dr. Quarles for CORE clinic. She should be seen in a couple of weeks for medication titration. EF at 15 %, just had RHC and coronary angiogram which showed,      Right sided filling pressures are normal.    Left sided filling pressures are normal.    Mild elevated pulmonary hypertension.    Normal cardiac output level.    Hemodynamic data has been modified in Epic per physician review.     Mild to moderate non obstructive CAD.  Non ischemic cardiomyopathy.      Dr. Quarles is going to talk with Dr. Schilling about her. I have a cardiac MRI scheduled at the end of May.  Mary Jo Wynn RN

## 2023-04-04 ENCOUNTER — CARE COORDINATION (OUTPATIENT)
Dept: CARDIOLOGY | Facility: CLINIC | Age: 73
End: 2023-04-04
Payer: MEDICARE

## 2023-04-05 ENCOUNTER — TELEPHONE (OUTPATIENT)
Dept: CARDIOLOGY | Facility: CLINIC | Age: 73
End: 2023-04-05
Payer: MEDICARE

## 2023-04-05 DIAGNOSIS — R93.1 DECREASED CARDIAC EJECTION FRACTION: ICD-10-CM

## 2023-04-05 DIAGNOSIS — R06.02 SOB (SHORTNESS OF BREATH): ICD-10-CM

## 2023-04-05 RX ORDER — LISINOPRIL 5 MG/1
5 TABLET ORAL DAILY
Qty: 90 TABLET | Refills: 0 | Status: SHIPPED | OUTPATIENT
Start: 2023-04-05 | End: 2023-05-11

## 2023-04-05 NOTE — PROGRESS NOTES
Patient is referred by Dr. Quarles who has discussed case with Dr. Schilling.  Pt has EF 14%, was cath'd and felt this is nonischemic cardiomyoapthy.

## 2023-04-05 NOTE — PROGRESS NOTES
PLAN: Offer appt with Dr. Schilling on 4/25 at noon, labs prior  - hold 11am for lunch spot held. Next appt after that is 5/16    APPOINTMENT MADE: Patient contacted and appointment made with Dr. Schilling on 4/25 with labs prior.

## 2023-04-05 NOTE — TELEPHONE ENCOUNTER
S-(situation): patient states she is feeling well, does state that she hasn't really been exerting herself. She does not have a blood pressure cuff yet but she should be getting one today. Her weights are stable 137 pounds after discontinuation of lasix.     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    A-(assessment): stable weights    R-(recommendations): Date: 4/5/2023    Time of Call: 12:39 PM     Diagnosis: heart failure      [ TORB ] Ordering provider: Dr. Pastor Quarles  Order: increase lisinopril to 5 mg daily     Order received by: Mary Jo Wynn RN     Follow-up/additional notes: patient agrees and understands the plan. She will begin to monitor her HR, BP and weights daily. Will call next week for GMDT.

## 2023-04-07 NOTE — TELEPHONE ENCOUNTER
[facility] Call Center    Phone Message    May a detailed message be left on voicemail: yes     Reason for Call: Other: Patient is requesting a call back from the team, Mary Jo preferrably per patient, to discuss her MRI. Please call patient back to discss, thank you!     Action Taken: Message routed to:  Other: Cardiology    Travel Screening: Not Applicable

## 2023-04-21 ENCOUNTER — CARE COORDINATION (OUTPATIENT)
Dept: CARDIOLOGY | Facility: CLINIC | Age: 73
End: 2023-04-21
Payer: MEDICARE

## 2023-04-21 DIAGNOSIS — I50.9 HEART FAILURE, UNSPECIFIED (H): Primary | ICD-10-CM

## 2023-04-24 NOTE — PROGRESS NOTES
"April 25, 2023    Kayli Morillo  is a 72 year old year old female with a past medical history significant for HFrEF 2/2 NICM, nonobstructive CAD from 3/2023 invasive coronary angiogram, and T2DM who presents for evaluation of chronic systolic heart failure. Kayli initially presented to an [facility] ED about 1 month ago. She was dyspneic, had a \"raspiness\" in her throat and mild lower extremity edema. A brief w/u revealed a pleural effusion and decreased cardiac function by bedside ultrasound. She was then seen in cardiology clinic two weeks ago with an echocardiogram. This echo revealed decreased LVEF of 14%. A subsequent coronary angiogram revealed nonobstructive CAD, and a right heart catheterization revealed normal filling pressures and borderline normal cardiac output/index. Given the lack of ischemic etiology, a cardiac MRI was ordered for further w/u of HF etiology. This is scheduled for next month (5/2023).    Since her heart catheterization, she continues to feel better. Her dyspnea has improved and she is less tired than before. She is not able to consistently ascend a flight of stairs without needing to stop midway to rest. She can ambulate about her house without difficulty. Last week, she had to ambulate from a parking lot to a high school gym. The walk was about 2 blocks, and this was sufficient to cause substantial dyspnea and she needed to rest for a while after this walk. Fortunately, she otherwise denies chest discomfort, palpitation, dizziness or edema.      PAST MEDICAL HISTORY:  Past Medical History:   Diagnosis Date     Chronic systolic heart failure (H)      DM2 (diabetes mellitus, type 2) (H)      Gastroesophageal reflux disease without esophagitis      Osteopenia      FAMILY HISTORY:  Family History   Problem Relation Age of Onset     Diabetes Mother      Cerebrovascular Disease Father      Alzheimer Disease Father      SOCIAL HISTORY:  Social History     Socioeconomic History     Marital " "status:    Tobacco Use     Smoking status: Never     Passive exposure: Past     Smokeless tobacco: Never     CURRENT MEDICATIONS:  Current Outpatient Medications   Medication     aspirin (ASA) 81 MG chewable tablet     atorvastatin (LIPITOR) 10 MG tablet     Calcium Carb-Cholecalciferol 500-10 MG-MCG TABS     furosemide (LASIX) 20 MG tablet     lisinopril (ZESTRIL) 5 MG tablet     metFORMIN (GLUCOPHAGE) 500 MG tablet     metoprolol succinate ER (TOPROL XL) 50 MG 24 hr tablet     Multiple Vitamins-Minerals (ICAPS AREDS 2 PO)     empagliflozin (JARDIANCE) 10 MG TABS tablet     No current facility-administered medications for this visit.     ROS:   Constitutional: No fever, chills, or sweats. Weight is 137 lbs 9.6 oz  ENT: No visual disturbance, ear ache, epistaxis, sore throat.   Allergies/Immunologic: Negative.   Respiratory: No cough, hemoptysis.   Cardiovascular: As per HPI.   GI: No nausea, vomiting, hematemesis, melena, or hematochezia.   : No urinary frequency, dysuria, or hematuria.   Integument: Negative.   Psychiatric: Pleasant, no major depression noted  Neuro: No focal neurological deficits noted  Endocrinology: Negative.   Musculoskeletal: As per HPI.      EXAM:  /60 (BP Location: Right arm, Patient Position: Sitting, Cuff Size: Adult Regular)   Pulse 82   Ht 1.504 m (4' 11.21\")   Wt 62.4 kg (137 lb 9.6 oz)   SpO2 94%   BMI 27.59 kg/m    General: appears comfortable, alert and oriented  Head: normocephalic, atraumatic  Eyes: anicteric sclera, EOMI , PERRL  Neck: no adenopathy  Orophyarynx: moist mucosa, no lesions noted  Heart: S1/S2 heard, no murmurs, rubs or gallop. Estimated JVP at 5 cmH2O  Lungs: normal breath sounds, no wheezing  Abdomen: soft, non-tender, bowel sounds present, no hepatosplenomegaly  Extremities: No LE edema today  Skin: no open lesions noted  Neuro: grossly non-focal     Labs:  Lab Results   Component Value Date    WBC 6.7 04/25/2023    HGB 14.1 04/25/2023    HCT " 44.0 04/25/2023     04/25/2023     04/25/2023    POTASSIUM 4.3 04/25/2023    CHLORIDE 104 04/25/2023    CO2 28 04/25/2023    BUN 18.8 04/25/2023    CR 0.94 04/25/2023     (H) 04/25/2023    NTBNP 2,492 (H) 04/25/2023    AST 18 04/25/2023    ALT 14 04/25/2023    ALKPHOS 81 04/25/2023    BILITOTAL 0.5 04/25/2023    INR 1.01 03/31/2023       Echocardiogram 3/16/23  Moderate left ventricular dilation (LVIDd 6.1cm). Severe diffuse hypokinesis.  Biplane LVEF is 14.3%.  The right ventricle is normal size and function.  Moderate mitral insufficiency is present due to dilated left ventricle.  Moderate pulmonary hypertension. Right ventricular systolic pressure is 52mmHg.  The inferior vena cava was normal in size with preserved respiratory variability.       ASSESSMENT AND PLAN:  1. Chronic systolic heart failure/HFrEF (LV EF 14%) secondary to nonischemic cardiomyopathy  NYHA Symptom Class III  Stage C  Primary Cardiologist: Pastor Quarles; Last seen 3/16/2023  ACE-I/ARB/ARNi: currently tolerating lisinopril 5, consider switching to Entresto after cardiac MRI  BB: currently tolerating Toprol XL 25 qday  Aldosterone antagonist: start spironolactone 12.5mg daily  SCD prophylaxis: will discuss after cardiac MRI and after 3 months of GDMT  %BiV pacing: N/A  Fluid status: euvolemic  Cardiac Rehab: will refer    Plan:  1. Start spironolactone 25 mg qday  2. Zio Patch to evaluate for arrhythmias that may be contributing to systolic dysfunction  3. Consider switching lisinopril to Entresto sometime in the future, I think doing this after the CORE visit is reasonable  4. Cardiac MRI as planned  5. F/u after MRI    Follow-up: in 2-3 months after cardiac MRI    The patient was seen and discussed with attending physician, Dr. Albaro Schilling MD.     Vinod Mcpherson MD, PhD  Cardiology Fellow  Click to text page [phone]    I have seen and evaluated the patient and agree with the assessment and plan as above.     I  appreciate the opportunity to participate in the care of Kayli Morillo . Please do not hesitate to contact me with any further questions.    Albaro Schilling MD  [facility]  Cardiovascular Division

## 2023-04-25 ENCOUNTER — OFFICE VISIT (OUTPATIENT)
Dept: CARDIOLOGY | Facility: CLINIC | Age: 73
End: 2023-04-25
Attending: INTERNAL MEDICINE
Payer: MEDICARE

## 2023-04-25 ENCOUNTER — LAB (OUTPATIENT)
Dept: LAB | Facility: CLINIC | Age: 73
End: 2023-04-25
Payer: MEDICARE

## 2023-04-25 VITALS
WEIGHT: 137.6 LBS | OXYGEN SATURATION: 94 % | HEIGHT: 59 IN | HEART RATE: 82 BPM | BODY MASS INDEX: 27.74 KG/M2 | DIASTOLIC BLOOD PRESSURE: 60 MMHG | SYSTOLIC BLOOD PRESSURE: 104 MMHG

## 2023-04-25 DIAGNOSIS — I42.8 NON-ISCHEMIC CARDIOMYOPATHY (H): ICD-10-CM

## 2023-04-25 DIAGNOSIS — I50.9 HEART FAILURE, UNSPECIFIED (H): ICD-10-CM

## 2023-04-25 DIAGNOSIS — I10 BENIGN ESSENTIAL HYPERTENSION: Primary | ICD-10-CM

## 2023-04-25 DIAGNOSIS — R93.1 DECREASED CARDIAC EJECTION FRACTION: ICD-10-CM

## 2023-04-25 DIAGNOSIS — I50.20 HEART FAILURE WITH REDUCED EJECTION FRACTION, NYHA CLASS III (H): ICD-10-CM

## 2023-04-25 DIAGNOSIS — I49.3 PVC'S (PREMATURE VENTRICULAR CONTRACTIONS): ICD-10-CM

## 2023-04-25 DIAGNOSIS — R06.02 SOB (SHORTNESS OF BREATH): ICD-10-CM

## 2023-04-25 DIAGNOSIS — I42.8 NONISCHEMIC CARDIOMYOPATHY (H): ICD-10-CM

## 2023-04-25 LAB
ALBUMIN SERPL BCG-MCNC: 3.9 G/DL (ref 3.5–5.2)
ALP SERPL-CCNC: 81 U/L (ref 35–104)
ALT SERPL W P-5'-P-CCNC: 14 U/L (ref 10–35)
ANION GAP SERPL CALCULATED.3IONS-SCNC: 9 MMOL/L (ref 7–15)
AST SERPL W P-5'-P-CCNC: 18 U/L (ref 10–35)
BILIRUB SERPL-MCNC: 0.5 MG/DL
BUN SERPL-MCNC: 18.8 MG/DL (ref 8–23)
CALCIUM SERPL-MCNC: 10 MG/DL (ref 8.8–10.2)
CHLORIDE SERPL-SCNC: 104 MMOL/L (ref 98–107)
CREAT SERPL-MCNC: 0.94 MG/DL (ref 0.51–0.95)
DEPRECATED HCO3 PLAS-SCNC: 28 MMOL/L (ref 22–29)
ERYTHROCYTE [DISTWIDTH] IN BLOOD BY AUTOMATED COUNT: 13.4 % (ref 10–15)
GFR SERPL CREATININE-BSD FRML MDRD: 63 ML/MIN/1.73M2
GLUCOSE SERPL-MCNC: 219 MG/DL (ref 70–99)
HCT VFR BLD AUTO: 44 % (ref 35–47)
HGB BLD-MCNC: 14.1 G/DL (ref 11.7–15.7)
MCH RBC QN AUTO: 29.7 PG (ref 26.5–33)
MCHC RBC AUTO-ENTMCNC: 32 G/DL (ref 31.5–36.5)
MCV RBC AUTO: 93 FL (ref 78–100)
NT-PROBNP SERPL-MCNC: 2492 PG/ML (ref 0–900)
PLATELET # BLD AUTO: 243 10E3/UL (ref 150–450)
POTASSIUM SERPL-SCNC: 4.3 MMOL/L (ref 3.4–5.3)
PROT SERPL-MCNC: 6.9 G/DL (ref 6.4–8.3)
RBC # BLD AUTO: 4.74 10E6/UL (ref 3.8–5.2)
SODIUM SERPL-SCNC: 141 MMOL/L (ref 136–145)
WBC # BLD AUTO: 6.7 10E3/UL (ref 4–11)

## 2023-04-25 PROCEDURE — 85027 COMPLETE CBC AUTOMATED: CPT | Performed by: PATHOLOGY

## 2023-04-25 PROCEDURE — G0463 HOSPITAL OUTPT CLINIC VISIT: HCPCS | Mod: 25 | Performed by: INTERNAL MEDICINE

## 2023-04-25 PROCEDURE — 80053 COMPREHEN METABOLIC PANEL: CPT | Performed by: PATHOLOGY

## 2023-04-25 PROCEDURE — 83880 ASSAY OF NATRIURETIC PEPTIDE: CPT | Performed by: PATHOLOGY

## 2023-04-25 PROCEDURE — 93246 EXT ECG>7D<15D RECORDING: CPT

## 2023-04-25 PROCEDURE — 36415 COLL VENOUS BLD VENIPUNCTURE: CPT | Performed by: PATHOLOGY

## 2023-04-25 PROCEDURE — 99214 OFFICE O/P EST MOD 30 MIN: CPT | Mod: 25 | Performed by: INTERNAL MEDICINE

## 2023-04-25 RX ORDER — SPIRONOLACTONE 25 MG/1
25 TABLET ORAL DAILY
Qty: 90 TABLET | Refills: 3 | Status: SHIPPED | OUTPATIENT
Start: 2023-04-25 | End: 2023-10-13

## 2023-04-25 ASSESSMENT — PAIN SCALES - GENERAL: PAINLEVEL: NO PAIN (0)

## 2023-04-25 NOTE — PATIENT INSTRUCTIONS
Dr. Schilling recommends:    Start taking Spironolactone 25 MG once daily.    14 day Zio Patch heart monitor.    Cardiac rehab referral placed.    Follow up clinic visit with Dr. Schilling in 2 months with labs the same day.    Thank you for your visit today.  Please call me with any questions or concerns.   Bhavesh Herrera RN  Cardiology Care Coordinator  [phone]

## 2023-04-25 NOTE — NURSING NOTE
Per Dr. Schilling, patient to have Zio monitor placed.  Diagnosis: PVCs  Monitor placed: Yes  Patient Instructed: Yes  Patient verbalized understanding: Yes  Monitor #[id number]    Monitor was placed by JOSEF Cruz   1:44 PM

## 2023-04-25 NOTE — NURSING NOTE
Chief Complaint   Patient presents with     New Patient     Dr. Schilling: Patient is referred by Pastor Quarles MD for cardiac evaluation related to history of:  R93.1 (ICD-10-CM) - Decreased cardiac ejection fraction  I42.8 (ICD-10-CM) - Non-ischemic cardiomyopathy (H)       Vitals were taken, medications reconciled.    Shonna Pickard, EMT   11:44 AM

## 2023-04-25 NOTE — LETTER
"4/25/2023      RE: Kayli Morillo  [address] [facility] [address]       Dear Colleague,    Thank you for the opportunity to participate in the care of your patient, Kayli Morillo, at the [facility]. Please see a copy of my visit note below.    April 25, 2023    Kayli Morillo  is a 72 year old year old female with a past medical history significant for HFrEF 2/2 NICM, nonobstructive CAD from 3/2023 invasive coronary angiogram, and T2DM who presents for evaluation of chronic systolic heart failure. Kayli initially presented to an [facility] ED about 1 month ago. She was dyspneic, had a \"raspiness\" in her throat and mild lower extremity edema. A brief w/u revealed a pleural effusion and decreased cardiac function by bedside ultrasound. She was then seen in cardiology clinic two weeks ago with an echocardiogram. This echo revealed decreased LVEF of 14%. A subsequent coronary angiogram revealed nonobstructive CAD, and a right heart catheterization revealed normal filling pressures and borderline normal cardiac output/index. Given the lack of ischemic etiology, a cardiac MRI was ordered for further w/u of HF etiology. This is scheduled for next month (5/2023).    Since her heart catheterization, she continues to feel better. Her dyspnea has improved and she is less tired than before. She is not able to consistently ascend a flight of stairs without needing to stop midway to rest. She can ambulate about her house without difficulty. Last week, she had to ambulate from a parking lot to a high school gym. The walk was about 2 blocks, and this was sufficient to cause substantial dyspnea and she needed to rest for a while after this walk. Fortunately, she otherwise denies chest discomfort, palpitation, dizziness or edema.      PAST MEDICAL HISTORY:  Past Medical History:   Diagnosis Date    Chronic systolic heart failure (H)     DM2 " "(diabetes mellitus, type 2) (H)     Gastroesophageal reflux disease without esophagitis     Osteopenia      FAMILY HISTORY:  Family History   Problem Relation Age of Onset    Diabetes Mother     Cerebrovascular Disease Father     Alzheimer Disease Father      SOCIAL HISTORY:  Social History     Socioeconomic History    Marital status:    Tobacco Use    Smoking status: Never     Passive exposure: Past    Smokeless tobacco: Never     CURRENT MEDICATIONS:  Current Outpatient Medications   Medication    aspirin (ASA) 81 MG chewable tablet    atorvastatin (LIPITOR) 10 MG tablet    Calcium Carb-Cholecalciferol 500-10 MG-MCG TABS    furosemide (LASIX) 20 MG tablet    lisinopril (ZESTRIL) 5 MG tablet    metFORMIN (GLUCOPHAGE) 500 MG tablet    metoprolol succinate ER (TOPROL XL) 50 MG 24 hr tablet    Multiple Vitamins-Minerals (ICAPS AREDS 2 PO)    empagliflozin (JARDIANCE) 10 MG TABS tablet     No current facility-administered medications for this visit.     ROS:   Constitutional: No fever, chills, or sweats. Weight is 137 lbs 9.6 oz  ENT: No visual disturbance, ear ache, epistaxis, sore throat.   Allergies/Immunologic: Negative.   Respiratory: No cough, hemoptysis.   Cardiovascular: As per HPI.   GI: No nausea, vomiting, hematemesis, melena, or hematochezia.   : No urinary frequency, dysuria, or hematuria.   Integument: Negative.   Psychiatric: Pleasant, no major depression noted  Neuro: No focal neurological deficits noted  Endocrinology: Negative.   Musculoskeletal: As per HPI.      EXAM:  /60 (BP Location: Right arm, Patient Position: Sitting, Cuff Size: Adult Regular)   Pulse 82   Ht 1.504 m (4' 11.21\")   Wt 62.4 kg (137 lb 9.6 oz)   SpO2 94%   BMI 27.59 kg/m    General: appears comfortable, alert and oriented  Head: normocephalic, atraumatic  Eyes: anicteric sclera, EOMI , PERRL  Neck: no adenopathy  Orophyarynx: moist mucosa, no lesions noted  Heart: S1/S2 heard, no murmurs, rubs or gallop. " Estimated JVP at 5 cmH2O  Lungs: normal breath sounds, no wheezing  Abdomen: soft, non-tender, bowel sounds present, no hepatosplenomegaly  Extremities: No LE edema today  Skin: no open lesions noted  Neuro: grossly non-focal     Labs:  Lab Results   Component Value Date    WBC 6.7 04/25/2023    HGB 14.1 04/25/2023    HCT 44.0 04/25/2023     04/25/2023     04/25/2023    POTASSIUM 4.3 04/25/2023    CHLORIDE 104 04/25/2023    CO2 28 04/25/2023    BUN 18.8 04/25/2023    CR 0.94 04/25/2023     (H) 04/25/2023    NTBNP 2,492 (H) 04/25/2023    AST 18 04/25/2023    ALT 14 04/25/2023    ALKPHOS 81 04/25/2023    BILITOTAL 0.5 04/25/2023    INR 1.01 03/31/2023       Echocardiogram 3/16/23  Moderate left ventricular dilation (LVIDd 6.1cm). Severe diffuse hypokinesis.  Biplane LVEF is 14.3%.  The right ventricle is normal size and function.  Moderate mitral insufficiency is present due to dilated left ventricle.  Moderate pulmonary hypertension. Right ventricular systolic pressure is 52mmHg.  The inferior vena cava was normal in size with preserved respiratory variability.       ASSESSMENT AND PLAN:  Chronic systolic heart failure/HFrEF (LV EF 14%) secondary to nonischemic cardiomyopathy  NYHA Symptom Class III  Stage C  Primary Cardiologist: Pastor Quarles; Last seen 3/16/2023  ACE-I/ARB/ARNi: currently tolerating lisinopril 5, consider switching to Entresto after cardiac MRI  BB: currently tolerating Toprol XL 25 qday  Aldosterone antagonist: start spironolactone 12.5mg daily  SCD prophylaxis: will discuss after cardiac MRI and after 3 months of GDMT  %BiV pacing: N/A  Fluid status: euvolemic  Cardiac Rehab: will refer    Plan:  1. Start spironolactone 25 mg qday  2. Zio Patch to evaluate for arrhythmias that may be contributing to systolic dysfunction  3. Consider switching lisinopril to Entresto sometime in the future, I think doing this after the CORE visit is reasonable  4. Cardiac MRI as planned  5.  F/u after MRI    Follow-up: in 2-3 months after cardiac MRI    The patient was seen and discussed with attending physician, Dr. Albaro Schilling MD.     Vinod Mcpherson MD, PhD  Cardiology Fellow  Click to text page [phone]    I have seen and evaluated the patient and agree with the assessment and plan as above.     I appreciate the opportunity to participate in the care of Kayli Morillo . Please do not hesitate to contact me with any further questions.    Albaro Schilling MD  [facility]  Cardiovascular Division          Please do not hesitate to contact me if you have any questions/concerns.     Sincerely,     Albaro Schilling MD

## 2023-05-04 DIAGNOSIS — I50.20 HEART FAILURE WITH REDUCED EJECTION FRACTION, NYHA CLASS III (H): Primary | ICD-10-CM

## 2023-05-10 NOTE — PROGRESS NOTES
HPI:   Ms. Mroillo is a 72 year old female with a past medical history including HFrEF 2/2 NICM, nonobstructive CAD from 3/2023 invasive coronary angiogram, and T2DM who presents for evaluation of chronic systolic heart failure.    Sine her diagnosis, many of her symptoms have improved. She still has CERON with walking, she gets this after about a block. This winter she could only walk a half block. One year ago she could walk close to a mile. Her orthopnea has resolved- she is back to sleeping in bed. No more PND. She has slight edema In her legs, but this is improved. No abdominal edema. No lightheadedness, dizziness, pre-syncope, or syncope. No palpitations. No chest pain. Appetite is normal and she denies early satiety or post-prandial nausea.    Weights had been coming down- was 138 and now has been down to 134-135. Her SBPs have been 100-120. DBPs have been lower- 50s, uses a wrist cuff.     She will start cardiac rehab soon- appointment next week.    She snores- has never been evaluated for sleep apnea.    Cardiac Medications  Aldactone 25 mg daily  Metoprolol succinate 25 mg daily  Lisinopril 5 mg BID  Lasix 20 mg daily  Jardiance- not taking, cost prohibitive  Lipitor 40 mg daily  ASA 81 mg daily    PAST MEDICAL HISTORY:  Past Medical History:   Diagnosis Date     Chronic systolic heart failure (H)      DM2 (diabetes mellitus, type 2) (H)      Gastroesophageal reflux disease without esophagitis      Osteopenia        FAMILY HISTORY:  Family History   Problem Relation Age of Onset     Diabetes Mother      Cerebrovascular Disease Father      Alzheimer Disease Father        SOCIAL HISTORY:  Social History     Socioeconomic History     Marital status:    Tobacco Use     Smoking status: Never     Passive exposure: Past     Smokeless tobacco: Never       CURRENT MEDICATIONS:  aspirin (ASA) 81 MG chewable tablet,   atorvastatin (LIPITOR) 10 MG tablet, Take 1 tablet (10 mg) by mouth daily  Calcium  "Carb-Cholecalciferol 500-10 MG-MCG TABS,   furosemide (LASIX) 20 MG tablet, Take 1 tablet (20 mg) by mouth daily  metFORMIN (GLUCOPHAGE) 500 MG tablet, 2 times daily (with meals)  metoprolol succinate ER (TOPROL XL) 50 MG 24 hr tablet, Take 25 mg by mouth  Multiple Vitamins-Minerals (ICAPS AREDS 2 PO),   spironolactone (ALDACTONE) 25 MG tablet, Take 1 tablet (25 mg) by mouth daily  empagliflozin (JARDIANCE) 10 MG TABS tablet, Take 1 tablet (10 mg) by mouth daily (Patient not taking: Reported on 4/25/2023)    No current facility-administered medications on file prior to visit.      ROS:   Refer to HPI    EXAM:  /72 (BP Location: Right arm, Patient Position: Sitting, Cuff Size: Adult Regular)   Pulse 76   Ht 1.495 m (4' 10.86\")   Wt 61.6 kg (135 lb 14.4 oz)   SpO2 96%   BMI 27.58 kg/m    GENERAL: Appears comfortable, in no acute distress.   HEENT: Eye symmetrical, no discharge or icterus bilaterally. Mucous membranes moist and without lesions.  CV: RRR, +S1S2, no murmur, rub, or gallop. JVP <6.   RESPIRATORY: Respirations regular, even, and unlabored. Lungs CTA throughout.   GI: Soft and non distended with normoactive bowel sounds. No tenderness, rebound, guarding.   EXTREMITIES: Trace b/l lower extremity peripheral edema. All extremities are warm and well perfused.  NEUROLOGIC: Alert and interacting appropriately. No focal deficits.   MUSCULOSKELETAL: No joint swelling or tenderness.   SKIN: No jaundice. No rashes or lesions.     Labs, reviewed with patient in clinic today:  CBC RESULTS:  Lab Results   Component Value Date    WBC 7.9 05/11/2023    RBC 4.76 05/11/2023    HGB 13.9 05/11/2023    HCT 44.1 05/11/2023    MCV 93 05/11/2023    MCH 29.2 05/11/2023    MCHC 31.5 05/11/2023    RDW 13.8 05/11/2023     05/11/2023       CMP RESULTS:  Lab Results   Component Value Date     05/11/2023    POTASSIUM 4.5 05/11/2023    CHLORIDE 104 05/11/2023    CHLORIDE 103 03/23/2023    CO2 29 05/11/2023    " ANIONGAP 9 05/11/2023     (H) 05/11/2023     (H) 03/31/2023    BUN 19.9 05/11/2023    CR 0.95 05/11/2023    GFRESTIMATED 63 05/11/2023    JULIANNE 10.3 (H) 05/11/2023    BILITOTAL 0.4 05/11/2023    ALBUMIN 4.1 05/11/2023    ALKPHOS 83 05/11/2023    ALT 14 05/11/2023    AST 17 05/11/2023        INR RESULTS:  Lab Results   Component Value Date    INR 1.01 03/31/2023       No results found for: MAG  No results found for: NTBNPI  Lab Results   Component Value Date    NTBNP 1,127 (H) 05/11/2023       Diagnostics:  3/16/23 ECHO  Interpretation Summary  Moderate left ventricular dilation (LVIDd 6.1cm). Severe diffuse hypokinesis.  Biplane LVEF is 14.3%.  The right ventricle is normal size and function.  Moderate mitral insufficiency is present due to dilated left ventricle.  Moderate pulmonary hypertension. Right ventricular systolic pressure is 52  mmHg.  The inferior vena cava was normal in size with preserved respiratory  variability.     There is no prior study for direct comparison. Dr. Quarles informed of the  Findings.    3/31/23 Coronary angiogram and RHC  Diagnostic  Dominance: Right  Left Main   The vessel was visualized by selective angiography, is moderate in size and is angiographically normal.      Left Anterior Descending   The vessel is moderate in size.   Prox LAD lesion is 20% stenosed. The lesion is focal.   Prox LAD to Mid LAD lesion is 25% stenosed with 65% stenosed side branch in 1st Diag.      First Diagonal Branch   The vessel is moderate in size.      First Septal Branch   The vessel is small and is angiographically normal.      Second Diagonal Branch   The vessel is small.      Second Septal Branch   The vessel is small and is angiographically normal.      Third Diagonal Branch   The vessel is small and is angiographically normal.      Left Circumflex   The vessel is moderate in size and is angiographically normal.      First Obtuse Marginal Branch   The vessel is small and is  angiographically normal.      Second Obtuse Marginal Branch   The vessel is moderate in size.   2nd Mrg lesion is 40% stenosed.      Lateral Second Obtuse Marginal Branch   The vessel is moderate in size and is angiographically normal.      Third Obtuse Marginal Branch   The vessel is moderate in size and is angiographically normal.      Fourth Obtuse Marginal Branch   The vessel is small and is angiographically normal.      Right Coronary Artery   The vessel was visualized by selective angiography and is moderate in size.   Mid RCA lesion is 20% stenosed.      Acute Marginal Branch   The vessel is moderate in size and is angiographically normal.      Right Ventricular Branch   The vessel is small and is angiographically normal.      Right Posterior Descending Artery   The vessel is moderate in size and is angiographically normal.      Right Posterior Atrioventricular Artery   The vessel is small and is angiographically normal.      NIBP 117/56/78  RA --/--/3  RV 42/3  PA 42/14/24  PCWP --/--/10  PA Sat 62%  West CO/CI 3.5/2.2  TD CO/CI 3.6/2.3  SVR 1444 (TD) dynes  PVR 3.6 (TD) AMOR     Right sided filling pressures are normal. Left sided filling pressures are normal. Mild elevated pulmonary hypertension. Normal cardiac output level. Hemodynamic data has been modified in Epic per physician review.      Assessment and Plan:   Ms. Morillo is a 72 year old female with a past medical history including HFrEF 2/2 NICM, nonobstructive CAD from 3/2023 invasive coronary angiogram, and T2DM who presents for evaluation of chronic systolic heart failure.    She has a recent HF diagnosis. She is doing well with her medication titration. We will try to make medication changes every two weeks until we achieve her maximumu doses- she has home BP cuff and HR monitor and scale.    She is having a cMRI in 2 weeks for futher diagnostics. Her angiogram had only non-obstructive disease.    # Chronic systolic heart failure/HFrEF secondary  to NICM. EF 15%.   Stage C. NYHA Class III.    Fluid status: euvolemic, continue lasix 20 mg daily  ACEi/ARB/ARNI: increase lisinopril 5 mg BID, will check on insurance coverage for entresto  BB: ongoing titration, for now, continue metoprolol xl 25 mg daily  Aldosterone antagonist: continue aldactone 25 mg daily  SGLT2i- currently cost prohibitive, will give paperwork for patience assistance  SCD prophylaxis: decision deferred during medication uptitration  NSAID use: contraindicated  Sleep apnea evaluation: referred to sleep clinic (outside referall)  Remote monitoring: Would consider in the future if more volume concerns, currently I don't think this is needed  Other: Will need ECHO 90 days after achieving goal doses    # T2DM  - Will need to communicate with her PCP if we are able to add an SGLT2i (she is on metformin)    # Non-obstructive coronary artery disease as per 2023 angiogram  - Continue ASA and lipitor    # CKD stage 2  - BMP in 2 weeks (increasig lisinopril)      Follow up   - MRI on 5/25 as scheduled  - We will get labs on 5/25 as well (increased lisinopril, consider change to entresto pending insurance coverage vs further lisinopril titration)  - CORE (virtual visit) in 1 month  - Dr. Schilling in July as scheduled      50 minutes spent face-to-face with patient, and an additional 8 minutes completing documentation and coordination of care on the day of service    Eden Pugh PA-C  [facility]          CC  POWER, ANA PERKINS

## 2023-05-11 ENCOUNTER — LAB (OUTPATIENT)
Dept: LAB | Facility: CLINIC | Age: 73
End: 2023-05-11
Payer: MEDICARE

## 2023-05-11 ENCOUNTER — TELEPHONE (OUTPATIENT)
Dept: CARDIOLOGY | Facility: CLINIC | Age: 73
End: 2023-05-11

## 2023-05-11 ENCOUNTER — OFFICE VISIT (OUTPATIENT)
Dept: CARDIOLOGY | Facility: CLINIC | Age: 73
End: 2023-05-11
Attending: PHYSICIAN ASSISTANT
Payer: MEDICARE

## 2023-05-11 VITALS
HEART RATE: 76 BPM | WEIGHT: 135.9 LBS | HEIGHT: 59 IN | DIASTOLIC BLOOD PRESSURE: 72 MMHG | SYSTOLIC BLOOD PRESSURE: 120 MMHG | OXYGEN SATURATION: 96 % | BODY MASS INDEX: 27.4 KG/M2

## 2023-05-11 DIAGNOSIS — I50.20 HEART FAILURE WITH REDUCED EJECTION FRACTION, NYHA CLASS III (H): ICD-10-CM

## 2023-05-11 DIAGNOSIS — I42.8 NONISCHEMIC CARDIOMYOPATHY (H): ICD-10-CM

## 2023-05-11 DIAGNOSIS — R06.02 SOB (SHORTNESS OF BREATH): ICD-10-CM

## 2023-05-11 DIAGNOSIS — I10 BENIGN ESSENTIAL HYPERTENSION: ICD-10-CM

## 2023-05-11 DIAGNOSIS — I49.3 PVC'S (PREMATURE VENTRICULAR CONTRACTIONS): ICD-10-CM

## 2023-05-11 DIAGNOSIS — I50.20 HEART FAILURE WITH REDUCED EJECTION FRACTION, NYHA CLASS III (H): Primary | ICD-10-CM

## 2023-05-11 DIAGNOSIS — R93.1 DECREASED CARDIAC EJECTION FRACTION: ICD-10-CM

## 2023-05-11 LAB
ALBUMIN SERPL BCG-MCNC: 4.1 G/DL (ref 3.5–5.2)
ALP SERPL-CCNC: 83 U/L (ref 35–104)
ALT SERPL W P-5'-P-CCNC: 14 U/L (ref 10–35)
ANION GAP SERPL CALCULATED.3IONS-SCNC: 9 MMOL/L (ref 7–15)
AST SERPL W P-5'-P-CCNC: 17 U/L (ref 10–35)
BILIRUB SERPL-MCNC: 0.4 MG/DL
BUN SERPL-MCNC: 19.9 MG/DL (ref 8–23)
CALCIUM SERPL-MCNC: 10.3 MG/DL (ref 8.8–10.2)
CHLORIDE SERPL-SCNC: 104 MMOL/L (ref 98–107)
CREAT SERPL-MCNC: 0.95 MG/DL (ref 0.51–0.95)
DEPRECATED HCO3 PLAS-SCNC: 29 MMOL/L (ref 22–29)
ERYTHROCYTE [DISTWIDTH] IN BLOOD BY AUTOMATED COUNT: 13.8 % (ref 10–15)
GFR SERPL CREATININE-BSD FRML MDRD: 63 ML/MIN/1.73M2
GLUCOSE SERPL-MCNC: 131 MG/DL (ref 70–99)
HCT VFR BLD AUTO: 44.1 % (ref 35–47)
HGB BLD-MCNC: 13.9 G/DL (ref 11.7–15.7)
MCH RBC QN AUTO: 29.2 PG (ref 26.5–33)
MCHC RBC AUTO-ENTMCNC: 31.5 G/DL (ref 31.5–36.5)
MCV RBC AUTO: 93 FL (ref 78–100)
NT-PROBNP SERPL-MCNC: 1127 PG/ML (ref 0–900)
PLATELET # BLD AUTO: 246 10E3/UL (ref 150–450)
POTASSIUM SERPL-SCNC: 4.5 MMOL/L (ref 3.4–5.3)
PROT SERPL-MCNC: 7 G/DL (ref 6.4–8.3)
RBC # BLD AUTO: 4.76 10E6/UL (ref 3.8–5.2)
SODIUM SERPL-SCNC: 142 MMOL/L (ref 136–145)
WBC # BLD AUTO: 7.9 10E3/UL (ref 4–11)

## 2023-05-11 PROCEDURE — 80053 COMPREHEN METABOLIC PANEL: CPT | Performed by: PATHOLOGY

## 2023-05-11 PROCEDURE — 36415 COLL VENOUS BLD VENIPUNCTURE: CPT | Performed by: PATHOLOGY

## 2023-05-11 PROCEDURE — 99215 OFFICE O/P EST HI 40 MIN: CPT | Performed by: PHYSICIAN ASSISTANT

## 2023-05-11 PROCEDURE — 83880 ASSAY OF NATRIURETIC PEPTIDE: CPT | Performed by: PATHOLOGY

## 2023-05-11 PROCEDURE — G0463 HOSPITAL OUTPT CLINIC VISIT: HCPCS | Performed by: PHYSICIAN ASSISTANT

## 2023-05-11 PROCEDURE — 85027 COMPLETE CBC AUTOMATED: CPT | Performed by: PATHOLOGY

## 2023-05-11 RX ORDER — LISINOPRIL 5 MG/1
5 TABLET ORAL 2 TIMES DAILY
Qty: 180 TABLET | Refills: 1 | Status: SHIPPED | OUTPATIENT
Start: 2023-05-11 | End: 2023-06-29

## 2023-05-11 ASSESSMENT — PAIN SCALES - GENERAL: PAINLEVEL: NO PAIN (0)

## 2023-05-11 NOTE — TELEPHONE ENCOUNTER
Test claims requested for the following medications:    Farxiga 10 mg, Entresto 24/26 mg BID       Farxiga 10mg daily 90 days supply   $538.00 copay         Entresto 24-26mg BID 90 days supply   $646.00 copay

## 2023-05-11 NOTE — PATIENT INSTRUCTIONS
Take your medicines every day, as directed    Changes made today:  Increase lisinopril to 5 mg twice daily    We will send through prescriptions for farxiga and entresto. DON'T PICK THESE UP from pharmacy unless we tell you too    Monitor Your Weight and Symptoms    Contact us if you:    Gain 2 pounds in one day or 5 pounds in one week  Feel more short of breath  Notice more leg swelling  Feel lightheadeded   Change your lifestyle    Limit Salt or Sodium:  2000 mg  Limit Fluids:  2000 mL or approximately 64 ounces  Eat a Heart Healthy Diet  Low in saturated fats  Stay Active:  Aim to move at least 150 minutes every  week         To Contact us    During Business Hours:  [phone], option # 1      After hours, weekends or holidays:   [phone], Option #4  Ask to speak to the On-Call Cardiologist. Inform them you are a CORE/heart failure patient at the [facility].     Use [employer] allows you to communicate directly with your heart team through secure messaging.  Way[employer] can be accessed any time on your phone, computer, or tablet.  If you need assistance, we'd be happy to help!         Keep your Heart Appointments:    - We will send you a referal to an outside sleep clinic    - MRI on 5/25 as scheduled    - We will get labs on 5/25 as well    - CORE (virtual visit) in 1 month    - Dr. Schilling in July as scheduled       Please consider attending our virtual support group which is held monthly. Please reach out to Olvin at [phone] for more information if you are interested in attending.       2023 dates:    Monday, May 1st, 1-2pm (Topic: CORE clinic)  Monday, June 5th, 1-2pm (Topic: Exercise and Cardiac Rehab: An Essential Tool in Managing Heart Failure)  Monday, July 3rd, 1-2pm  Monday, August 7th, 1-2pm  Monday, September 11th, 1-2pm  Monday, October 2nd, 1-2pm  Monday, November 6th, 1-2pm  Monday, December 4th, 1-2pm

## 2023-05-11 NOTE — NURSING NOTE
Chief Complaint   Patient presents with     New Patient      New CORE; 72 year old with new systolic heart failure presents for initial visit with labs prior          Vitals were taken and medications reconciled.     Sohan Whitley, EMT   10:50 AM

## 2023-05-11 NOTE — NURSING NOTE
Diet: Patient instructed regarding a heart healthy diet, including discussion of reduced fat and sodium intake. Patient demonstrated understanding of this information and agreed to call with further questions or concerns.  Labs: Patient was given results of the laboratory testing obtained today. Patient was instructed to return for the next laboratory testing in 2 weeks . Patient demonstrated understanding of this information and agreed to call with further questions or concerns.   Return Appointment: Patient given instructions regarding scheduling next clinic visit. Patient demonstrated understanding of this information and agreed to call with further questions or concerns.  CORE virtual in one month  Dr Schilling in July     Medication Change: Patient was educated regarding prescribed medication change, including discussion of the indication, administration, side effects, and when to report to MD or RN. Patient demonstrated understanding of this information and agreed to call with further questions or concerns.  Increase Lisinopril to 5 mg BID.   Running Entresto 24/26 mg BID and Farxiga 10 mg daily for approval.   Patient stated she understood all health information given and agreed to call with further questions or concerns.

## 2023-05-11 NOTE — LETTER
5/11/2023      RE: Kayli Morillo  [address] [facility] [address]       Dear Colleague,    Thank you for the opportunity to participate in the care of your patient, Kayli Morillo, at the [facility]. Please see a copy of my visit note below.    HPI:   Ms. Morillo is a 72 year old female with a past medical history including HFrEF 2/2 NICM, nonobstructive CAD from 3/2023 invasive coronary angiogram, and T2DM who presents for evaluation of chronic systolic heart failure.    Sine her diagnosis, many of her symptoms have improved. She still has CERON with walking, she gets this after about a block. This winter she could only walk a half block. One year ago she could walk close to a mile. Her orthopnea has resolved- she is back to sleeping in bed. No more PND. She has slight edema In her legs, but this is improved. No abdominal edema. No lightheadedness, dizziness, pre-syncope, or syncope. No palpitations. No chest pain. Appetite is normal and she denies early satiety or post-prandial nausea.    Weights had been coming down- was 138 and now has been down to 134-135. Her SBPs have been 100-120. DBPs have been lower- 50s, uses a wrist cuff.     She will start cardiac rehab soon- appointment next week.    She snores- has never been evaluated for sleep apnea.    Cardiac Medications  Aldactone 25 mg daily  Metoprolol succinate 25 mg daily  Lisinopril 5 mg BID  Lasix 20 mg daily  Jardiance- not taking, cost prohibitive  Lipitor 40 mg daily  ASA 81 mg daily    PAST MEDICAL HISTORY:  Past Medical History:   Diagnosis Date    Chronic systolic heart failure (H)     DM2 (diabetes mellitus, type 2) (H)     Gastroesophageal reflux disease without esophagitis     Osteopenia        FAMILY HISTORY:  Family History   Problem Relation Age of Onset    Diabetes Mother     Cerebrovascular Disease Father     Alzheimer Disease Father        SOCIAL  "HISTORY:  Social History     Socioeconomic History    Marital status:    Tobacco Use    Smoking status: Never     Passive exposure: Past    Smokeless tobacco: Never       CURRENT MEDICATIONS:  aspirin (ASA) 81 MG chewable tablet,   atorvastatin (LIPITOR) 10 MG tablet, Take 1 tablet (10 mg) by mouth daily  Calcium Carb-Cholecalciferol 500-10 MG-MCG TABS,   furosemide (LASIX) 20 MG tablet, Take 1 tablet (20 mg) by mouth daily  metFORMIN (GLUCOPHAGE) 500 MG tablet, 2 times daily (with meals)  metoprolol succinate ER (TOPROL XL) 50 MG 24 hr tablet, Take 25 mg by mouth  Multiple Vitamins-Minerals (ICAPS AREDS 2 PO),   spironolactone (ALDACTONE) 25 MG tablet, Take 1 tablet (25 mg) by mouth daily  empagliflozin (JARDIANCE) 10 MG TABS tablet, Take 1 tablet (10 mg) by mouth daily (Patient not taking: Reported on 4/25/2023)    No current facility-administered medications on file prior to visit.      ROS:   Refer to HPI    EXAM:  /72 (BP Location: Right arm, Patient Position: Sitting, Cuff Size: Adult Regular)   Pulse 76   Ht 1.495 m (4' 10.86\")   Wt 61.6 kg (135 lb 14.4 oz)   SpO2 96%   BMI 27.58 kg/m    GENERAL: Appears comfortable, in no acute distress.   HEENT: Eye symmetrical, no discharge or icterus bilaterally. Mucous membranes moist and without lesions.  CV: RRR, +S1S2, no murmur, rub, or gallop. JVP <6.   RESPIRATORY: Respirations regular, even, and unlabored. Lungs CTA throughout.   GI: Soft and non distended with normoactive bowel sounds. No tenderness, rebound, guarding.   EXTREMITIES: Trace b/l lower extremity peripheral edema. All extremities are warm and well perfused.  NEUROLOGIC: Alert and interacting appropriately. No focal deficits.   MUSCULOSKELETAL: No joint swelling or tenderness.   SKIN: No jaundice. No rashes or lesions.     Labs, reviewed with patient in clinic today:  CBC RESULTS:  Lab Results   Component Value Date    WBC 7.9 05/11/2023    RBC 4.76 05/11/2023    HGB 13.9 05/11/2023 "    HCT 44.1 05/11/2023    MCV 93 05/11/2023    MCH 29.2 05/11/2023    MCHC 31.5 05/11/2023    RDW 13.8 05/11/2023     05/11/2023       CMP RESULTS:  Lab Results   Component Value Date     05/11/2023    POTASSIUM 4.5 05/11/2023    CHLORIDE 104 05/11/2023    CHLORIDE 103 03/23/2023    CO2 29 05/11/2023    ANIONGAP 9 05/11/2023     (H) 05/11/2023     (H) 03/31/2023    BUN 19.9 05/11/2023    CR 0.95 05/11/2023    GFRESTIMATED 63 05/11/2023    JULIANNE 10.3 (H) 05/11/2023    BILITOTAL 0.4 05/11/2023    ALBUMIN 4.1 05/11/2023    ALKPHOS 83 05/11/2023    ALT 14 05/11/2023    AST 17 05/11/2023        INR RESULTS:  Lab Results   Component Value Date    INR 1.01 03/31/2023       No results found for: MAG  No results found for: NTBNPI  Lab Results   Component Value Date    NTBNP 1,127 (H) 05/11/2023       Diagnostics:  3/16/23 ECHO  Interpretation Summary  Moderate left ventricular dilation (LVIDd 6.1cm). Severe diffuse hypokinesis.  Biplane LVEF is 14.3%.  The right ventricle is normal size and function.  Moderate mitral insufficiency is present due to dilated left ventricle.  Moderate pulmonary hypertension. Right ventricular systolic pressure is 52  mmHg.  The inferior vena cava was normal in size with preserved respiratory  variability.     There is no prior study for direct comparison. Dr. Quarles informed of the  Findings.    3/31/23 Coronary angiogram and RHC  Diagnostic  Dominance: Right  Left Main   The vessel was visualized by selective angiography, is moderate in size and is angiographically normal.      Left Anterior Descending   The vessel is moderate in size.   Prox LAD lesion is 20% stenosed. The lesion is focal.   Prox LAD to Mid LAD lesion is 25% stenosed with 65% stenosed side branch in 1st Diag.      First Diagonal Branch   The vessel is moderate in size.      First Septal Branch   The vessel is small and is angiographically normal.      Second Diagonal Branch   The vessel is small.       Second Septal Branch   The vessel is small and is angiographically normal.      Third Diagonal Branch   The vessel is small and is angiographically normal.      Left Circumflex   The vessel is moderate in size and is angiographically normal.      First Obtuse Marginal Branch   The vessel is small and is angiographically normal.      Second Obtuse Marginal Branch   The vessel is moderate in size.   2nd Mrg lesion is 40% stenosed.      Lateral Second Obtuse Marginal Branch   The vessel is moderate in size and is angiographically normal.      Third Obtuse Marginal Branch   The vessel is moderate in size and is angiographically normal.      Fourth Obtuse Marginal Branch   The vessel is small and is angiographically normal.      Right Coronary Artery   The vessel was visualized by selective angiography and is moderate in size.   Mid RCA lesion is 20% stenosed.      Acute Marginal Branch   The vessel is moderate in size and is angiographically normal.      Right Ventricular Branch   The vessel is small and is angiographically normal.      Right Posterior Descending Artery   The vessel is moderate in size and is angiographically normal.      Right Posterior Atrioventricular Artery   The vessel is small and is angiographically normal.      NIBP 117/56/78  RA --/--/3  RV 42/3  PA 42/14/24  PCWP --/--/10  PA Sat 62%  West CO/CI 3.5/2.2  TD CO/CI 3.6/2.3  SVR 1444 (TD) dynes  PVR 3.6 (TD) AMOR     Right sided filling pressures are normal. Left sided filling pressures are normal. Mild elevated pulmonary hypertension. Normal cardiac output level. Hemodynamic data has been modified in Epic per physician review.      Assessment and Plan:   Ms. Morillo is a 72 year old female with a past medical history including HFrEF 2/2 NICM, nonobstructive CAD from 3/2023 invasive coronary angiogram, and T2DM who presents for evaluation of chronic systolic heart failure.    She has a recent HF diagnosis. She is doing well with her medication  titration. We will try to make medication changes every two weeks until we achieve her maximumu doses- she has home BP cuff and HR monitor and scale.    She is having a cMRI in 2 weeks for futher diagnostics. Her angiogram had only non-obstructive disease.    # Chronic systolic heart failure/HFrEF secondary to NICM. EF 15%.   Stage C. NYHA Class III.    Fluid status: euvolemic, continue lasix 20 mg daily  ACEi/ARB/ARNI: increase lisinopril 5 mg BID, will check on insurance coverage for entresto  BB: ongoing titration, for now, continue metoprolol xl 25 mg daily  Aldosterone antagonist: continue aldactone 25 mg daily  SGLT2i- currently cost prohibitive, will give paperwork for patience assistance  SCD prophylaxis: decision deferred during medication uptitration  NSAID use: contraindicated  Sleep apnea evaluation: referred to sleep clinic (outside referall)  Remote monitoring: Would consider in the future if more volume concerns, currently I don't think this is needed  Other: Will need ECHO 90 days after achieving goal doses    # T2DM  - Will need to communicate with her PCP if we are able to add an SGLT2i (she is on metformin)    # Non-obstructive coronary artery disease as per 2023 angiogram  - Continue ASA and lipitor    # CKD stage 2  - BMP in 2 weeks (increasig lisinopril)      Follow up   - MRI on 5/25 as scheduled  - We will get labs on 5/25 as well (increased lisinopril, consider change to entresto pending insurance coverage vs further lisinopril titration)  - CORE (virtual visit) in 1 month  - Dr. Schilling in July as scheduled      50 minutes spent face-to-face with patient, and an additional 8 minutes completing documentation and coordination of care on the day of service    Eden Pugh PA-C  [facility]          CC  POWER, ANA PERKINS

## 2023-05-21 ENCOUNTER — HEALTH MAINTENANCE LETTER (OUTPATIENT)
Age: 73
End: 2023-05-21

## 2023-05-25 ENCOUNTER — LAB (OUTPATIENT)
Dept: LAB | Facility: CLINIC | Age: 73
End: 2023-05-25
Attending: INTERNAL MEDICINE
Payer: MEDICARE

## 2023-05-25 ENCOUNTER — HOSPITAL ENCOUNTER (OUTPATIENT)
Dept: MRI IMAGING | Facility: CLINIC | Age: 73
Discharge: HOME | End: 2023-05-25
Attending: INTERNAL MEDICINE
Payer: MEDICARE

## 2023-05-25 ENCOUNTER — PATIENT OUTREACH (OUTPATIENT)
Dept: CARDIOLOGY | Facility: CLINIC | Age: 73
End: 2023-05-25

## 2023-05-25 DIAGNOSIS — I50.20 HEART FAILURE WITH REDUCED EJECTION FRACTION, NYHA CLASS III (H): ICD-10-CM

## 2023-05-25 DIAGNOSIS — I42.8 NON-ISCHEMIC CARDIOMYOPATHY (H): ICD-10-CM

## 2023-05-25 LAB
ANION GAP SERPL CALCULATED.3IONS-SCNC: 10 MMOL/L (ref 7–15)
BUN SERPL-MCNC: 20.9 MG/DL (ref 8–23)
CALCIUM SERPL-MCNC: 9.7 MG/DL (ref 8.8–10.2)
CHLORIDE SERPL-SCNC: 105 MMOL/L (ref 98–107)
CREAT SERPL-MCNC: 0.91 MG/DL (ref 0.51–0.95)
DEPRECATED HCO3 PLAS-SCNC: 27 MMOL/L (ref 22–29)
GFR SERPL CREATININE-BSD FRML MDRD: 66 ML/MIN/1.73M2
GLUCOSE SERPL-MCNC: 128 MG/DL (ref 70–99)
POTASSIUM SERPL-SCNC: 4.8 MMOL/L (ref 3.4–5.3)
SODIUM SERPL-SCNC: 142 MMOL/L (ref 136–145)

## 2023-05-25 PROCEDURE — 80048 BASIC METABOLIC PNL TOTAL CA: CPT

## 2023-05-25 PROCEDURE — G1010 CDSM STANSON: HCPCS | Performed by: INTERNAL MEDICINE

## 2023-05-25 PROCEDURE — A9585 GADOBUTROL INJECTION: HCPCS | Performed by: INTERNAL MEDICINE

## 2023-05-25 PROCEDURE — 75561 CARDIAC MRI FOR MORPH W/DYE: CPT | Mod: 26 | Performed by: INTERNAL MEDICINE

## 2023-05-25 PROCEDURE — 255N000002 HC RX 255 OP 636: Performed by: INTERNAL MEDICINE

## 2023-05-25 PROCEDURE — G1010 CDSM STANSON: HCPCS

## 2023-05-25 PROCEDURE — 36415 COLL VENOUS BLD VENIPUNCTURE: CPT

## 2023-05-25 RX ORDER — GADOBUTROL 604.72 MG/ML
7.5 INJECTION INTRAVENOUS ONCE
Status: COMPLETED | OUTPATIENT
Start: 2023-05-25 | End: 2023-05-25

## 2023-05-25 RX ADMIN — GADOBUTROL 7.5 ML: 604.72 INJECTION INTRAVENOUS at 07:41

## 2023-05-25 NOTE — PROGRESS NOTES
Called Kayli to review labs. She has been feeling ok.   Checks her blood pressure at home.   May 4, 5, 6 had a convention she went to and was wearing heart monitor at that time. Zio results are in Epic.    BP has been consistent - had one high systolic reading of 147/something - but was in a hurry at that time.   Otherwise has been 114/69, 113/67, 109/65.   6/12 had one day of low readings - was 99/52, pulse 73 99/73 pulse 74, 95/49 pulse 72   Yesterday was 102/53, 103/55, 102/53. Night 117/64, 106/53.   HR has been ranging - 65, 69, 82, 82, 76.     No dizziness or lightheadedness.   Did some walking at a convention and walked about a block and a half and had to stop a few times. Feels like that's her norm right now.  Weight has been stable between 133 - 136 lbs.      We discussed [employer] message I sent with costs of Entresto and Farxiga which are very high. We talked about patient assistance. I sent links to the forms in [employer] and she will look at these. I told her if she decides to fill them out to let us know and we will get our portion filled out, otherwise can discuss more in clinic visit virtually on 6/9.   Usha Martino RN

## 2023-05-26 NOTE — PROGRESS NOTES
Reviewed with Justina RAHMAN. No further titration right now. Continue current regimen.   Continue BP monitor log and have with her at video visit on 6/9. [employer] message sent. Usha Martino RN

## 2023-06-08 ENCOUNTER — TRANSFERRED RECORDS (OUTPATIENT)
Dept: HEALTH INFORMATION MANAGEMENT | Facility: CLINIC | Age: 73
End: 2023-06-08
Payer: MEDICARE

## 2023-06-08 LAB
CHOLESTEROL (EXTERNAL): 107 MG/DL (ref 110–200)
CREATININE (EXTERNAL): 0.95 MG/DL (ref 0.5–0.9)
GFR ESTIMATED (EXTERNAL): 63 ML/MIN/1.73M2
GLUCOSE (EXTERNAL): 115 MG/DL (ref 70–100)
HDLC SERPL-MCNC: 49 MG/DL (ref 40–120)
LDL CHOLESTEROL CALCULATED (EXTERNAL): 41 MG/DL (ref 0–129)
POTASSIUM (EXTERNAL): 4.5 MMOL/L (ref 3.5–5)
TRIGLYCERIDES (EXTERNAL): 86 MG/DL (ref 35–149)

## 2023-06-09 ENCOUNTER — VIRTUAL VISIT (OUTPATIENT)
Dept: CARDIOLOGY | Facility: CLINIC | Age: 73
End: 2023-06-09
Attending: INTERNAL MEDICINE
Payer: MEDICARE

## 2023-06-09 DIAGNOSIS — I50.20 HEART FAILURE WITH REDUCED EJECTION FRACTION, NYHA CLASS III (H): ICD-10-CM

## 2023-06-09 PROCEDURE — 99442 PR PHYSICIAN TELEPHONE EVALUATION 11-20 MIN: CPT | Mod: 95 | Performed by: PHYSICIAN ASSISTANT

## 2023-06-09 NOTE — PROGRESS NOTES
Telephone visit (video did not work  Start time 11:31 AM  End time 11:46 AM    HPI:   Ms. Morillo is a 72 year old female with a past medical history including HFrEF 2/2 NICM, nonobstructive CAD from 3/2023 invasive coronary angiogram, and T2DM who presents for evaluation of chronic systolic heart failure.    Since the last visit she is feeling about the same. No SOB at rest. She gets CERON after about 1-2 blocks. One year ago she could walk close to a mile. Her orthopnea has resolved- she is back to sleeping in bed. No more PND. LE edema has resolved! No abdominal edema. She has had some low bps (down to SBP of 80). Before the stomach flu her SBPs were running 100s/50s-110s/50s No lightheadedness, dizziness, pre-syncope, or syncope. No palpitations. No chest pain. With the exception of yesterday when there as the stomach flu, Appetite is normal and she denies early satiety or post-prandial nausea.    She did have vomiting last night has there has been stomach flu in the house. Did not take her medications last night. Stomach feels better and no more vomiting. Other people in the house were sick as well.    Weights had been coming down-today was 128.4 lbs. Yesterday 129 lbs. 6/7 was 131 lbs. On 6/6 was 131. On 6/5 was 129.5.     She started cardiac rehab this week.    Cardiac Medications  Aldactone 25 mg daily  Metoprolol succinate 25 mg daily  Lisinopril 5 mg BID  Jardiance- not taking, cost prohibitive  Lipitor 20 mg daily  ASA 81 mg daily    PAST MEDICAL HISTORY:  Past Medical History:   Diagnosis Date     Chronic systolic heart failure (H)      DM2 (diabetes mellitus, type 2) (H)      Gastroesophageal reflux disease without esophagitis      Osteopenia        FAMILY HISTORY:  Family History   Problem Relation Age of Onset     Diabetes Mother      Cerebrovascular Disease Father      Alzheimer Disease Father        SOCIAL HISTORY:  Social History     Socioeconomic History     Marital status:    Tobacco Use      Smoking status: Never     Passive exposure: Past     Smokeless tobacco: Never       CURRENT MEDICATIONS:  aspirin (ASA) 81 MG chewable tablet,   atorvastatin (LIPITOR) 10 MG tablet, Take 1 tablet (10 mg) by mouth daily  Calcium Carb-Cholecalciferol 500-10 MG-MCG TABS,   empagliflozin (JARDIANCE) 10 MG TABS tablet, Take 1 tablet (10 mg) by mouth daily (Patient not taking: Reported on 4/25/2023)  furosemide (LASIX) 20 MG tablet, Take 1 tablet (20 mg) by mouth daily  lisinopril (ZESTRIL) 5 MG tablet, Take 1 tablet (5 mg) by mouth 2 times daily  metFORMIN (GLUCOPHAGE) 500 MG tablet, 2 times daily (with meals)  metoprolol succinate ER (TOPROL XL) 50 MG 24 hr tablet, Take 25 mg by mouth  Multiple Vitamins-Minerals (ICAPS AREDS 2 PO),   spironolactone (ALDACTONE) 25 MG tablet, Take 1 tablet (25 mg) by mouth daily    No current facility-administered medications on file prior to visit.      ROS:   Refer to HPI    EXAM:  There were no vitals taken for this visit.  N/A video visit    Labs, reviewed with patient in clinic today:  CBC RESULTS:  Lab Results   Component Value Date    WBC 7.9 05/11/2023    RBC 4.76 05/11/2023    HGB 13.9 05/11/2023    HCT 44.1 05/11/2023    MCV 93 05/11/2023    MCH 29.2 05/11/2023    MCHC 31.5 05/11/2023    RDW 13.8 05/11/2023     05/11/2023       CMP RESULTS:  Lab Results   Component Value Date     05/25/2023    POTASSIUM 4.8 05/25/2023    CHLORIDE 105 05/25/2023    CHLORIDE 103 03/23/2023    CO2 27 05/25/2023    ANIONGAP 10 05/25/2023     (H) 05/25/2023     (H) 03/31/2023    BUN 20.9 05/25/2023    CR 0.91 05/25/2023    GFRESTIMATED 66 05/25/2023    JULIANNE 9.7 05/25/2023    BILITOTAL 0.4 05/11/2023    ALBUMIN 4.1 05/11/2023    ALKPHOS 83 05/11/2023    ALT 14 05/11/2023    AST 17 05/11/2023        INR RESULTS:  Lab Results   Component Value Date    INR 1.01 03/31/2023       No results found for: MAG  No results found for: NTBNPI  Lab Results   Component Value Date     NTBNP 1,127 (H) 05/11/2023       Diagnostics:  5/25/23 cMRI  1. The left ventricle is severely dilated. There is increased trabeculations in the left ventricle. There  is severe global hypokinesis. The systolic function severely reduced. The LVEF is traced at 19%.      2. The right ventricle is normal in cavity size. The global systolic function is normal. The RVEF is 51%.      3. The left atrium is severely enlarged. The right atrium is normal in size.      4. There is at least moderate functional mitral regurgitation.     5. There is mild hyperenhancement in the basal septum in the pattern of mid-myocardial stripe consistent  with non ischemic fibrosis.    T1 mapping - ECV 33% (elevated).   T2 mapping - no myocardial edema present.      6. There is trivial pericardial effusion.     7. There is no intracardiac thrombus.     CONCLUSIONS:   Non-ischemic cardiomyopathy with severe LV dysfunction and normal right ventricular function, LVEF of 19%  and RVEF 51%.  No evidence of myocardial edema.      CORE EXAM    3/16/23 ECHO  Interpretation Summary  Moderate left ventricular dilation (LVIDd 6.1cm). Severe diffuse hypokinesis.  Biplane LVEF is 14.3%.  The right ventricle is normal size and function.  Moderate mitral insufficiency is present due to dilated left ventricle.  Moderate pulmonary hypertension. Right ventricular systolic pressure is 52  mmHg.  The inferior vena cava was normal in size with preserved respiratory  variability.     There is no prior study for direct comparison. Dr. Quarles informed of the  Findings.    3/31/23 Coronary angiogram and RHC  Diagnostic  Dominance: Right  Left Main   The vessel was visualized by selective angiography, is moderate in size and is angiographically normal.      Left Anterior Descending   The vessel is moderate in size.   Prox LAD lesion is 20% stenosed. The lesion is focal.   Prox LAD to Mid LAD lesion is 25% stenosed with 65% stenosed side branch in 1st Diag.      First  Diagonal Branch   The vessel is moderate in size.      First Septal Branch   The vessel is small and is angiographically normal.      Second Diagonal Branch   The vessel is small.      Second Septal Branch   The vessel is small and is angiographically normal.      Third Diagonal Branch   The vessel is small and is angiographically normal.      Left Circumflex   The vessel is moderate in size and is angiographically normal.      First Obtuse Marginal Branch   The vessel is small and is angiographically normal.      Second Obtuse Marginal Branch   The vessel is moderate in size.   2nd Mrg lesion is 40% stenosed.      Lateral Second Obtuse Marginal Branch   The vessel is moderate in size and is angiographically normal.      Third Obtuse Marginal Branch   The vessel is moderate in size and is angiographically normal.      Fourth Obtuse Marginal Branch   The vessel is small and is angiographically normal.      Right Coronary Artery   The vessel was visualized by selective angiography and is moderate in size.   Mid RCA lesion is 20% stenosed.      Acute Marginal Branch   The vessel is moderate in size and is angiographically normal.      Right Ventricular Branch   The vessel is small and is angiographically normal.      Right Posterior Descending Artery   The vessel is moderate in size and is angiographically normal.      Right Posterior Atrioventricular Artery   The vessel is small and is angiographically normal.      NIBP 117/56/78  RA --/--/3  RV 42/3  PA 42/14/24  PCWP --/--/10  PA Sat 62%  West CO/CI 3.5/2.2  TD CO/CI 3.6/2.3  SVR 1444 (TD) dynes  PVR 3.6 (TD) AMOR     Right sided filling pressures are normal. Left sided filling pressures are normal. Mild elevated pulmonary hypertension. Normal cardiac output level. Hemodynamic data has been modified in Epic per physician review.      Assessment and Plan:   Ms. Morillo is a 72 year old female with a past medical history including HFrEF 2/2 NICM, nonobstructive CAD  from 3/2023 invasive coronary angiogram, and T2DM who presents for evaluation of chronic systolic heart failure.    She has a recent HF diagnosis. She is doing well with her medication titration. We will try to make medication changes every two weeks until we achieve her maximumu doses- she has home BP cuff and HR monitor and scale. Up until yesterday, her vitals looked okay for titraiton, but since then, she has gotten the stomach flu (other people in the family are sick as well). We will actually back off on some medication temporarily due to SBPs in the low 80s and plan to go back to baseline does once recovered and then resume neurohormonal blockade titration from there.    # Chronic systolic heart failure/HFrEF secondary to NICM. EF 15-20%   Stage C. NYHA Class III.    Fluid status: hypovolemic by history- off loop diuretics, holding aldactone over the weekend  ACEi/ARB/ARNI: decrease lisinopril to 5 mg daily over the weekend, go back to 5 mg BID on Monday, ongoing titraiton  BB: ongoing titration, for now, continue metoprolol xl 25 mg daily  Aldosterone antagonist: hold aldactone 25 mg daily until Monday, then resume   SGLT2i- currently cost prohibitive, will give paperwork for patience assistance. Was given some samples, but will hold off on starting until we know if we have long-term access to this medication  SCD prophylaxis: decision deferred during medication uptitration  NSAID use: contraindicated  Sleep apnea evaluation: referred to sleep clinic (outside referall)  Remote monitoring: Would consider in the future if more volume concerns, currently I don't think this is needed  Other: Will need ECHO 90 days after achieving maximum doses    # T2DM  - Will need to communicate with her PCP if we are able to add an SGLT2i (she is on metformin)    # Non-obstructive coronary artery disease as per 2023 angiogram  - Continue ASA and lipitor    # CKD stage 2  - Cr 0.95 and GFR 63 (outside labs), stable with recent  b/l      Follow up   - RN check in on Tuesday to review Bps  - CORE virutal visit in 2 weeks to resume titration after BPS recovered from the flue  - Dr. Schilling in July as scheduled  - CORE in 6 weeks      14 minutes spent on phone call with patient.    Eden Pugh PA-C  [facility]          CC  ANA LEWIS

## 2023-06-09 NOTE — NURSING NOTE
Diet: Patient instructed regarding a heart healthy diet, including discussion of reduced fat and sodium intake. Patient demonstrated understanding of this information and agreed to call with further questions or concerns.  Labs: Patient was given results of the laboratory testing obtained today. Patient was instructed to return for the next laboratory testing in 1 month. Patient demonstrated understanding of this information and agreed to call with further questions or concerns.   Return Appointment: Patient given instructions regarding scheduling next clinic visit. Patient demonstrated understanding of this information and agreed to call with further questions or concerns.  Dr Schilling in one month  CORE in 6 weeks    Medication Change: Patient was educated regarding prescribed medication change, including discussion of the indication, administration, side effects, and when to report to MD or RN. Patient demonstrated understanding of this information and agreed to call with further questions or concerns.  ? Decrease your lisinopril to 5 mg once a day. On Monday, you can go back to 5 mg twice a day     ? Don't take (Spironolactone) aldactone this weekend. You can restart 25 mg daily starting on Monday    Patient stated she understood all health information given and agreed to call with further questions or concerns.   Lab results from [facility] reviewed by provider and no changes at this time. Called Kayli to let her know this.

## 2023-06-09 NOTE — PATIENT INSTRUCTIONS
Take your medicines every day, as directed    Changes made today:  Decrease your lisinopril to 5 mg once a day. On Monday, you can go back to 5 mg twice a day    Don't take (Spironolactone) aldactone this weekend. You can restart 25 mg daily starting on Monday    Don't start the jardiance   Monitor Your Weight and Symptoms    Contact us if you:    Gain 2 pounds in one day or 5 pounds in one week  Feel more short of breath  Notice more leg swelling  Feel lightheadeded   Change your lifestyle    Limit Salt or Sodium:  2000 mg  Limit Fluids:  2000 mL or approximately 64 ounces  Eat a Heart Healthy Diet  Low in saturated fats  Stay Active:  Aim to move at least 150 minutes every  week         To Contact us    During Business Hours:  [phone], option # 1      After hours, weekends or holidays:   [phone], Option #4  Ask to speak to the On-Call Cardiologist. Inform them you are a CORE/heart failure patient at the [facility].     Use [employer] allows you to communicate directly with your heart team through secure messaging.  My COI can be accessed any time on your phone, computer, or tablet.  If you need assistance, we'd be happy to help!         Keep your Heart Appointments:    - We will track down your labs from yesterday    - Please work on the patient assistance for Jardiance- let us know when you've submitted the paperwork    - RN phone call on Tuesday to review blood pressures    - Dr. Schilling in 1 month     -CORE phone visit in 6 weeks        Please consider attending our virtual support group which is held monthly. Please reach out to Olvin at [phone] for more information if you are interested in attending.       2023 dates:    Monday, July 3rd, 1-2pm - Managing Heart Failure During Summer (Summer Tips) with Shonna Valencia NP  Monday, August 7th, 1-2pm  Monday, September 11th, 1-2pm  Monday, October 2nd, 1-2pm  Monday, November 6th, 1-2pm  Monday, December 4th, 1-2pm

## 2023-06-09 NOTE — LETTER
6/9/2023      RE: Kayli Morillo  [address] [facility] [address]       Dear Colleague,    Thank you for the opportunity to participate in the care of your patient, Kayli Morillo, at the [facility] at [facility]. Please see a copy of my visit note below.    Telephone visit (video did not work  Start time 11:31 AM  End time 11:46 AM    HPI:   Ms. Morillo is a 72 year old female with a past medical history including HFrEF 2/2 NICM, nonobstructive CAD from 3/2023 invasive coronary angiogram, and T2DM who presents for evaluation of chronic systolic heart failure.    Since the last visit she is feeling about the same. No SOB at rest. She gets CERON after about 1-2 blocks. One year ago she could walk close to a mile. Her orthopnea has resolved- she is back to sleeping in bed. No more PND. LE edema has resolved! No abdominal edema. She has had some low bps (down to SBP of 80). Before the stomach flu her SBPs were running 100s/50s-110s/50s No lightheadedness, dizziness, pre-syncope, or syncope. No palpitations. No chest pain. With the exception of yesterday when there as the stomach flu, Appetite is normal and she denies early satiety or post-prandial nausea.    She did have vomiting last night has there has been stomach flu in the house. Did not take her medications last night. Stomach feels better and no more vomiting. Other people in the house were sick as well.    Weights had been coming down-today was 128.4 lbs. Yesterday 129 lbs. 6/7 was 131 lbs. On 6/6 was 131. On 6/5 was 129.5.     She started cardiac rehab this week.    Cardiac Medications  Aldactone 25 mg daily  Metoprolol succinate 25 mg daily  Lisinopril 5 mg BID  Jardiance- not taking, cost prohibitive  Lipitor 20 mg daily  ASA 81 mg daily    PAST MEDICAL HISTORY:  Past Medical History:   Diagnosis Date    Chronic systolic heart failure (H)     DM2 (diabetes mellitus, type 2) (H)      Gastroesophageal reflux disease without esophagitis     Osteopenia        FAMILY HISTORY:  Family History   Problem Relation Age of Onset    Diabetes Mother     Cerebrovascular Disease Father     Alzheimer Disease Father        SOCIAL HISTORY:  Social History     Socioeconomic History    Marital status:    Tobacco Use    Smoking status: Never     Passive exposure: Past    Smokeless tobacco: Never       CURRENT MEDICATIONS:  aspirin (ASA) 81 MG chewable tablet,   atorvastatin (LIPITOR) 10 MG tablet, Take 1 tablet (10 mg) by mouth daily  Calcium Carb-Cholecalciferol 500-10 MG-MCG TABS,   empagliflozin (JARDIANCE) 10 MG TABS tablet, Take 1 tablet (10 mg) by mouth daily (Patient not taking: Reported on 4/25/2023)  furosemide (LASIX) 20 MG tablet, Take 1 tablet (20 mg) by mouth daily  lisinopril (ZESTRIL) 5 MG tablet, Take 1 tablet (5 mg) by mouth 2 times daily  metFORMIN (GLUCOPHAGE) 500 MG tablet, 2 times daily (with meals)  metoprolol succinate ER (TOPROL XL) 50 MG 24 hr tablet, Take 25 mg by mouth  Multiple Vitamins-Minerals (ICAPS AREDS 2 PO),   spironolactone (ALDACTONE) 25 MG tablet, Take 1 tablet (25 mg) by mouth daily    No current facility-administered medications on file prior to visit.      ROS:   Refer to HPI    EXAM:  There were no vitals taken for this visit.  N/A video visit    Labs, reviewed with patient in clinic today:  CBC RESULTS:  Lab Results   Component Value Date    WBC 7.9 05/11/2023    RBC 4.76 05/11/2023    HGB 13.9 05/11/2023    HCT 44.1 05/11/2023    MCV 93 05/11/2023    MCH 29.2 05/11/2023    MCHC 31.5 05/11/2023    RDW 13.8 05/11/2023     05/11/2023       CMP RESULTS:  Lab Results   Component Value Date     05/25/2023    POTASSIUM 4.8 05/25/2023    CHLORIDE 105 05/25/2023    CHLORIDE 103 03/23/2023    CO2 27 05/25/2023    ANIONGAP 10 05/25/2023     (H) 05/25/2023     (H) 03/31/2023    BUN 20.9 05/25/2023    CR 0.91 05/25/2023    GFRESTIMATED 66  05/25/2023    JULIANNE 9.7 05/25/2023    BILITOTAL 0.4 05/11/2023    ALBUMIN 4.1 05/11/2023    ALKPHOS 83 05/11/2023    ALT 14 05/11/2023    AST 17 05/11/2023        INR RESULTS:  Lab Results   Component Value Date    INR 1.01 03/31/2023       No results found for: MAG  No results found for: NTBNPI  Lab Results   Component Value Date    NTBNP 1,127 (H) 05/11/2023       Diagnostics:  5/25/23 cMRI  1. The left ventricle is severely dilated. There is increased trabeculations in the left ventricle. There  is severe global hypokinesis. The systolic function severely reduced. The LVEF is traced at 19%.      2. The right ventricle is normal in cavity size. The global systolic function is normal. The RVEF is 51%.      3. The left atrium is severely enlarged. The right atrium is normal in size.      4. There is at least moderate functional mitral regurgitation.     5. There is mild hyperenhancement in the basal septum in the pattern of mid-myocardial stripe consistent  with non ischemic fibrosis.    T1 mapping - ECV 33% (elevated).   T2 mapping - no myocardial edema present.      6. There is trivial pericardial effusion.     7. There is no intracardiac thrombus.     CONCLUSIONS:   Non-ischemic cardiomyopathy with severe LV dysfunction and normal right ventricular function, LVEF of 19%  and RVEF 51%.  No evidence of myocardial edema.      CORE EXAM    3/16/23 ECHO  Interpretation Summary  Moderate left ventricular dilation (LVIDd 6.1cm). Severe diffuse hypokinesis.  Biplane LVEF is 14.3%.  The right ventricle is normal size and function.  Moderate mitral insufficiency is present due to dilated left ventricle.  Moderate pulmonary hypertension. Right ventricular systolic pressure is 52  mmHg.  The inferior vena cava was normal in size with preserved respiratory  variability.     There is no prior study for direct comparison. Dr. Quarles informed of the  Findings.    3/31/23 Coronary angiogram and RHC  Diagnostic  Dominance:  Right  Left Main   The vessel was visualized by selective angiography, is moderate in size and is angiographically normal.      Left Anterior Descending   The vessel is moderate in size.   Prox LAD lesion is 20% stenosed. The lesion is focal.   Prox LAD to Mid LAD lesion is 25% stenosed with 65% stenosed side branch in 1st Diag.      First Diagonal Branch   The vessel is moderate in size.      First Septal Branch   The vessel is small and is angiographically normal.      Second Diagonal Branch   The vessel is small.      Second Septal Branch   The vessel is small and is angiographically normal.      Third Diagonal Branch   The vessel is small and is angiographically normal.      Left Circumflex   The vessel is moderate in size and is angiographically normal.      First Obtuse Marginal Branch   The vessel is small and is angiographically normal.      Second Obtuse Marginal Branch   The vessel is moderate in size.   2nd Mrg lesion is 40% stenosed.      Lateral Second Obtuse Marginal Branch   The vessel is moderate in size and is angiographically normal.      Third Obtuse Marginal Branch   The vessel is moderate in size and is angiographically normal.      Fourth Obtuse Marginal Branch   The vessel is small and is angiographically normal.      Right Coronary Artery   The vessel was visualized by selective angiography and is moderate in size.   Mid RCA lesion is 20% stenosed.      Acute Marginal Branch   The vessel is moderate in size and is angiographically normal.      Right Ventricular Branch   The vessel is small and is angiographically normal.      Right Posterior Descending Artery   The vessel is moderate in size and is angiographically normal.      Right Posterior Atrioventricular Artery   The vessel is small and is angiographically normal.      NIBP 117/56/78  RA --/--/3  RV 42/3  PA 42/14/24  PCWP --/--/10  PA Sat 62%  West CO/CI 3.5/2.2  TD CO/CI 3.6/2.3  SVR 1444 (TD) dynes  PVR 3.6 (TD) AMOR     Right sided  filling pressures are normal. Left sided filling pressures are normal. Mild elevated pulmonary hypertension. Normal cardiac output level. Hemodynamic data has been modified in Epic per physician review.      Assessment and Plan:   Ms. Morillo is a 72 year old female with a past medical history including HFrEF 2/2 NICM, nonobstructive CAD from 3/2023 invasive coronary angiogram, and T2DM who presents for evaluation of chronic systolic heart failure.    She has a recent HF diagnosis. She is doing well with her medication titration. We will try to make medication changes every two weeks until we achieve her maximumu doses- she has home BP cuff and HR monitor and scale. Up until yesterday, her vitals looked okay for titraiton, but since then, she has gotten the stomach flu (other people in the family are sick as well). We will actually back off on some medication temporarily due to SBPs in the low 80s and plan to go back to baseline does once recovered and then resume neurohormonal blockade titration from there.    # Chronic systolic heart failure/HFrEF secondary to NICM. EF 15-20%   Stage C. NYHA Class III.    Fluid status: hypovolemic by history- off loop diuretics, holding aldactone over the weekend  ACEi/ARB/ARNI: decrease lisinopril to 5 mg daily over the weekend, go back to 5 mg BID on Monday, ongoing titraiton  BB: ongoing titration, for now, continue metoprolol xl 25 mg daily  Aldosterone antagonist: hold aldactone 25 mg daily until Monday, then resume   SGLT2i- currently cost prohibitive, will give paperwork for patience assistance. Was given some samples, but will hold off on starting until we know if we have long-term access to this medication  SCD prophylaxis: decision deferred during medication uptitration  NSAID use: contraindicated  Sleep apnea evaluation: referred to sleep clinic (outside referall)  Remote monitoring: Would consider in the future if more volume concerns, currently I don't think this is  needed  Other: Will need ECHO 90 days after achieving maximum doses    # T2DM  - Will need to communicate with her PCP if we are able to add an SGLT2i (she is on metformin)    # Non-obstructive coronary artery disease as per 2023 angiogram  - Continue ASA and lipitor    # CKD stage 2  - Cr 0.95 and GFR 63 (outside labs), stable with recent b/l      Follow up   - RN check in on Tuesday to review Bps  - CORE virutal visit in 2 weeks to resume titration after BPS recovered from the flue  - Dr. Schilling in July as scheduled  - CORE in 6 weeks      14 minutes spent on phone call with patient.    Eden Pugh PA-C  [facility]          CC  JOSHUA, ANA PERKINS

## 2023-06-13 ENCOUNTER — PATIENT OUTREACH (OUTPATIENT)
Dept: CARDIOLOGY | Facility: CLINIC | Age: 73
End: 2023-06-13
Payer: MEDICARE

## 2023-06-13 NOTE — PROGRESS NOTES
Reviewed with Justina RAHMAN. Will check in in 2 weeks for BP and possible further med titration at that time. Usha Martino RN

## 2023-06-13 NOTE — PROGRESS NOTES
Called Kayli to follow up on blood pressures. Seen in clinic Friday 6/9 virtually and following medication changes made due to stomach virus and lower BPs:    ? Decrease your lisinopril to 5 mg once a day. On Monday, you can go back to 5 mg twice a day     ? Don't take (Spironolactone) aldactone this weekend. You can restart 25 mg daily starting on Monday     ? Don't start the jardiance    She reports she's feeling better. BP the last few days    9th (day of visit) - 87/48, evening - 99/60, 98/48, 87/50  10th - AM 98/61, 97/54, 96/58, evening 104/50, 91/46, 91/43  11th - /57, 99/55, 101/54 evening - 108/57, 110/55, 107/53  12th - /61, 106/55, 112/57 evening - 115/68, 102/56, 100/56.     Reports she went back to her normal medication regimen on the Lisinopril and Spironolactone as of today (Tuesday). Will update provider.

## 2023-06-26 ENCOUNTER — TRANSFERRED RECORDS (OUTPATIENT)
Dept: HEALTH INFORMATION MANAGEMENT | Facility: CLINIC | Age: 73
End: 2023-06-26
Payer: MEDICARE

## 2023-06-29 ENCOUNTER — TELEPHONE (OUTPATIENT)
Dept: CARDIOLOGY | Facility: CLINIC | Age: 73
End: 2023-06-29
Payer: MEDICARE

## 2023-06-29 ENCOUNTER — PATIENT OUTREACH (OUTPATIENT)
Dept: CARDIOLOGY | Facility: CLINIC | Age: 73
End: 2023-06-29
Payer: MEDICARE

## 2023-06-29 DIAGNOSIS — R06.02 SOB (SHORTNESS OF BREATH): ICD-10-CM

## 2023-06-29 DIAGNOSIS — R93.1 DECREASED CARDIAC EJECTION FRACTION: ICD-10-CM

## 2023-06-29 RX ORDER — LISINOPRIL 5 MG/1
TABLET ORAL
Qty: 225 TABLET | Refills: 1 | Status: SHIPPED | OUTPATIENT
Start: 2023-06-29 | End: 2023-07-11 | Stop reason: ALTCHOICE

## 2023-06-29 NOTE — TELEPHONE ENCOUNTER
Spoke with the pharmacy They found the newer order for Lisinopril -Take 1 tablet (5 mg) by mouth 2 times daily ordered by Eden Pugh.  They will get it ready for Kayli  She has 1 refill remaining

## 2023-06-29 NOTE — TELEPHONE ENCOUNTER
[facility] Call Center    Phone Message    May a detailed message be left on voicemail: yes     Reason for Call: Medication Refill Request    Has the patient contacted the pharmacy for the refill? Yes   Name of medication being requested: lisinopril (ZESTRIL) 5 MG tablet [41325]    Provider who prescribed the medication: Eden  Pharmacy: [id number] - [place], [place] - [address]    Date medication is needed: 7/3/2023    Dr. Quarles is listed at the authorizing provider on Kayli's Rx      Action Taken: Other: Cardiology    Travel Screening: Not Applicable     Thank you!  [facility]

## 2023-06-29 NOTE — PROGRESS NOTES
Called Kayli to check in. 2 weeks ago had a stomach virus and some lower BPs so no changes made and opted to check in in 2 weeks to see if can titrate medications.   Spoke with Kayli and got these updates:     BP, pulse:  Today - 99/50 69, 97/49 64, 98/47 62  Wednesday - 87/48 67, 108/57 64, 104.47 66 evening 111/54 69, 109/55 71, 103/71 p64  Tuesday - 102/51 73, 100/52 78, 102/54 77    Weight is   3 days ago 126.2  Tuesday 124.8  Wed 125.4  Thurs 125  127 was highest on June 20th   Per Kayli for most part has been running in 124-126 range    No dizziness or lightheadedness. Isn't doing much to exert herself.   Has been doing cardiac rehab  Has Dr Schilling on 7/11

## 2023-06-29 NOTE — PROGRESS NOTES
Reviewed with Justina RAHMAN. Called Kayli with following recommendations:  Date: 6/29/2023    Time of Call: 3:35 PM     Diagnosis:  HF     [ VORB ] Ordering provider: Justina RAHMAN  Order: Would increase lisinopril to 5 mg in the morning and 7.5 mg in the evening (from 5 mg bid). Labs with 7/11 appointment. Would decrease to just checking one BP per day unless she is having symptoms.      Order received by: Usha Martino RN      Follow-up/additional notes: reviewed with Kayli who verbalized understanding. Refill sent to pharmacy.   Usha Martino RN

## 2023-07-05 ENCOUNTER — TELEPHONE (OUTPATIENT)
Dept: CARDIOLOGY | Facility: CLINIC | Age: 73
End: 2023-07-05
Payer: MEDICARE

## 2023-07-05 ENCOUNTER — CARE COORDINATION (OUTPATIENT)
Dept: CARDIOLOGY | Facility: CLINIC | Age: 73
End: 2023-07-05
Payer: MEDICARE

## 2023-07-05 DIAGNOSIS — I50.20 HEART FAILURE WITH REDUCED EJECTION FRACTION, NYHA CLASS III (H): Primary | ICD-10-CM

## 2023-07-05 NOTE — TELEPHONE ENCOUNTER
[facility] Call Center    Phone Message    May a detailed message be left on voicemail: yes     Reason for Call: Other: Please fax signed sleep study orders to Diya Wagner at . the original order was not signed. Thank you     Action Taken: Message routed to:  Other: cardiology    Travel Screening: Not Applicable       Thank you!  [facility]

## 2023-07-10 ASSESSMENT — ENCOUNTER SYMPTOMS
ORTHOPNEA: 0
SLEEP DISTURBANCES DUE TO BREATHING: 0
PALPITATIONS: 1
LEG PAIN: 0
SYNCOPE: 0
HYPOTENSION: 1
LIGHT-HEADEDNESS: 1
EXERCISE INTOLERANCE: 0
HYPERTENSION: 0

## 2023-07-10 NOTE — PROGRESS NOTES
"July 11, 2023     Kayli Morillo  is a 72 year old year old female with a past medical history significant for HFrEF 2/2 NICM, nonobstructive CAD from 3/2023 invasive coronary angiogram, and T2DM who presents for evaluation of chronic systolic heart failure. Kayli initially presented to an OSH earlier in 2023. She was dyspneic, had a \"raspiness\" in her throat and mild lower extremity edema. A brief w/u revealed a pleural effusion and decreased cardiac function by bedside ultrasound. She was then seen in cardiology clinic two weeks ago with an echocardiogram. This echo revealed decreased LVEF of 14%. A subsequent coronary angiogram revealed nonobstructive CAD, and a right heart catheterization revealed normal filling pressures and borderline normal cardiac output/index. Given the lack of ischemic etiology, a cardiac MRI was ordered. After I saw her she has also been followed by CORE clinic.  Other than some recent stomach flu she has been doing well with medications.  Temporarily spironolactone was held and lisinopril was reduced to once a day dosing however was able to increase it back up to 5 mg twice daily dosing and now 5 mg in the morning and 7-1/2 mg in the afternoon.  Today she is here for follow-up.  Notes that she is overall continues to feel tired and fatigued.  Nothing worse than she has been in the past.  She did have these episodes of symptomatic VT when she was kind of floating around having these diaphoretic episodes which resolved spontaneously and they seem to have correlated with the VT episodes were detected on the Zio patch.  Luckily she never fainted.  No chest pain no lower extremity edema or other complaints.  Takes her medications as prescribed.     PAST MEDICAL HISTORY:  Past Medical History:   Diagnosis Date     Chronic systolic heart failure (H)      DM2 (diabetes mellitus, type 2) (H)      Gastroesophageal reflux disease without esophagitis      Osteopenia      FAMILY HISTORY:  Family " "History   Problem Relation Age of Onset     Diabetes Mother      Cerebrovascular Disease Father      Alzheimer Disease Father      SOCIAL HISTORY:  Social History     Socioeconomic History     Marital status:    Tobacco Use     Smoking status: Never     Passive exposure: Past     Smokeless tobacco: Never     CURRENT MEDICATIONS:  Current Outpatient Medications   Medication     aspirin (ASA) 81 MG chewable tablet     atorvastatin (LIPITOR) 10 MG tablet     Calcium Carb-Cholecalciferol 500-10 MG-MCG TABS     empagliflozin (JARDIANCE) 10 MG TABS tablet     lisinopril (ZESTRIL) 5 MG tablet     metFORMIN (GLUCOPHAGE) 500 MG tablet     metoprolol succinate ER (TOPROL XL) 50 MG 24 hr tablet     Multiple Vitamins-Minerals (ICAPS AREDS 2 PO)     spironolactone (ALDACTONE) 25 MG tablet     No current facility-administered medications for this visit.     ROS:   Constitutional: No fever, chills, or sweats.   ENT: No visual disturbance, ear ache, epistaxis, sore throat.   Allergies/Immunologic: Negative.   Respiratory: No cough, hemoptysis.   Cardiovascular: As per HPI.   GI: No nausea, vomiting, hematemesis, melena, or hematochezia.   : No urinary frequency, dysuria, or hematuria.   Integument: Negative.   Psychiatric: Pleasant, no major depression noted  Neuro: No focal neurological deficits noted  Endocrinology: Negative.   Musculoskeletal: As per HPI.      EXAM:  BP 97/54 (BP Location: Right arm, Patient Position: Chair, Cuff Size: Adult Regular)   Pulse 80   Ht 1.499 m (4' 11\")   Wt 59.3 kg (130 lb 11.2 oz)   SpO2 98%   BMI 26.40 kg/m    General: appears comfortable, alert and oriented  Head: normocephalic, atraumatic  Eyes: anicteric sclera, EOMI , PERRL  Neck: no adenopathy  Orophyarynx: moist mucosa, no lesions noted  Heart: S1/S2 heard, no murmurs, rubs or gallop. Estimated JVP at 5 cmH2O  Lungs: normal breath sounds, no wheezing  Abdomen: soft, non-tender, bowel sounds present, no " hepatosplenomegaly  Extremities: No LE edema today  Skin: no open lesions noted  Neuro: grossly non-focal     Labs:  Lab Results   Component Value Date    WBC 7.9 05/11/2023    HGB 13.9 05/11/2023    HCT 44.1 05/11/2023     05/11/2023     05/25/2023    POTASSIUM 4.8 05/25/2023    CHLORIDE 105 05/25/2023    CO2 27 05/25/2023    BUN 20.9 05/25/2023    CR 0.91 05/25/2023     (H) 05/25/2023    NTBNP 1,127 (H) 05/11/2023    AST 17 05/11/2023    ALT 14 05/11/2023    ALKPHOS 83 05/11/2023    BILITOTAL 0.4 05/11/2023    INR 1.01 03/31/2023     Echocardiogram 3/16/23  Moderate left ventricular dilation (LVIDd 6.1cm). Severe diffuse hypokinesis.  Biplane LVEF is 14.3%.  The right ventricle is normal size and function.  Moderate mitral insufficiency is present due to dilated left ventricle.  Moderate pulmonary hypertension. Right ventricular systolic pressure is 52mmHg.  The inferior vena cava was normal in size with preserved respiratory variability.     Zio 4/2023:  79 runs of VT up to 19 beats were noted in addition a few episodes of SVT.     CMRI 5/2023:  1. The left ventricle is severely dilated. There is increased trabeculations in the left ventricle. There is severe global hypokinesis. The systolic function severely reduced. The LVEF is traced at 19%.   2. The right ventricle is normal in cavity size. The global systolic function is normal. The RVEF is 51%.   3. The left atrium is severely enlarged. The right atrium is normal in size.   4. There is at least moderate functional mitral regurgitation.  5. There is mild hyperenhancement in the basal septum in the pattern of mid-myocardial stripe consistent with non ischemic fibrosis.    T1 mapping - ECV 33% (elevated).   T2 mapping - no myocardial edema present.   6. There is trivial pericardial effusion.  7. There is no intracardiac thrombus.  CONCLUSIONS:   Non-ischemic cardiomyopathy with severe LV dysfunction and normal right ventricular function,  LVEF of 19% and RVEF 51%. No evidence of myocardial edema.     ASSESSMENT AND PLAN:  72-year-old lady with above past medical history including nonischemic cardiomyopathy severely reduced ejection fraction 19% LVEF normal at left ventricular function with episodes of symptomatic VT who is here for follow-up.  We discussed that I am a little bit worried about her symptomatic episodes of VT likely she never had a syncopal episode however this can propagate and can be extremely dangerous.  I do feel that she is on maximally tolerated medical therapy at this time given that her EF is extremely low and she does not tolerate medical titration well.  However we will continue to try.  She is agreeable to fairly urgent EP referral for defibrillator placement.  Her QRS is 138 ms.  1. Chronic systolic heart failure/HFrEF (LV EF 14%) secondary to nonischemic cardiomyopathy NYHA Symptom Class III Stage C, it was 19% on MRI however overall I think this is unchanged.  Primary Cardiologist: Pastor Quarles; Last seen 3/16/2023  ACE-I/ARB/ARNi: currently tolerating lisinopril, we will prescribe Entresto 12.5 mg twice daily dosing given that she has borderline blood pressure values.  She understands the need to stop lisinopril for 48 hours before starting Entresto.  BB: currently tolerating Toprol XL 25 qday, will try to titrate to 37.5 mg daily dosing however she is tired and fatigued and it might make her worse.  She will try to push through.  I think however this would help with her symptomatic VT episodes  Aldosterone antagonist: spironolactone, continue  SCD prophylaxis: I have discussed with the EP team.  We will refer evaluation for defibrillator considerations.  She is currently on board.  CRT-D can be considered at the time.  Fluid status: euvolemic  Cardiac Rehab:  continue     I will see her back in 3 months or sooner as needed.    I appreciate the opportunity to participate in the care of Kayli Morillo . Please do not  hesitate to contact me with any further questions.     Albaro Schilling MD  [facility]  Cardiovascular Division

## 2023-07-11 ENCOUNTER — OFFICE VISIT (OUTPATIENT)
Dept: CARDIOLOGY | Facility: CLINIC | Age: 73
End: 2023-07-11
Attending: INTERNAL MEDICINE
Payer: MEDICARE

## 2023-07-11 ENCOUNTER — LAB (OUTPATIENT)
Dept: LAB | Facility: CLINIC | Age: 73
End: 2023-07-11
Attending: INTERNAL MEDICINE
Payer: MEDICARE

## 2023-07-11 VITALS
HEART RATE: 80 BPM | WEIGHT: 130.7 LBS | OXYGEN SATURATION: 98 % | SYSTOLIC BLOOD PRESSURE: 97 MMHG | DIASTOLIC BLOOD PRESSURE: 54 MMHG | BODY MASS INDEX: 26.35 KG/M2 | HEIGHT: 59 IN

## 2023-07-11 DIAGNOSIS — I50.20 HEART FAILURE WITH REDUCED EJECTION FRACTION, NYHA CLASS III (H): ICD-10-CM

## 2023-07-11 DIAGNOSIS — E78.2 MIXED HYPERLIPIDEMIA: Primary | ICD-10-CM

## 2023-07-11 DIAGNOSIS — I42.8 NONISCHEMIC CARDIOMYOPATHY (H): ICD-10-CM

## 2023-07-11 DIAGNOSIS — I47.29 PAROXYSMAL VENTRICULAR TACHYCARDIA (H): ICD-10-CM

## 2023-07-11 DIAGNOSIS — I49.3 PVC'S (PREMATURE VENTRICULAR CONTRACTIONS): ICD-10-CM

## 2023-07-11 DIAGNOSIS — I10 BENIGN ESSENTIAL HYPERTENSION: ICD-10-CM

## 2023-07-11 LAB
ANION GAP SERPL CALCULATED.3IONS-SCNC: 8 MMOL/L (ref 7–15)
BUN SERPL-MCNC: 26.8 MG/DL (ref 8–23)
CALCIUM SERPL-MCNC: 9.6 MG/DL (ref 8.8–10.2)
CHLORIDE SERPL-SCNC: 105 MMOL/L (ref 98–107)
CREAT SERPL-MCNC: 1.02 MG/DL (ref 0.51–0.95)
DEPRECATED HCO3 PLAS-SCNC: 28 MMOL/L (ref 22–29)
GFR SERPL CREATININE-BSD FRML MDRD: 57 ML/MIN/1.73M2
GLUCOSE SERPL-MCNC: 118 MG/DL (ref 70–99)
NT-PROBNP SERPL-MCNC: 707 PG/ML (ref 0–900)
POTASSIUM SERPL-SCNC: 4.9 MMOL/L (ref 3.4–5.3)
SODIUM SERPL-SCNC: 141 MMOL/L (ref 136–145)

## 2023-07-11 PROCEDURE — 93005 ELECTROCARDIOGRAM TRACING: CPT

## 2023-07-11 PROCEDURE — 83880 ASSAY OF NATRIURETIC PEPTIDE: CPT | Performed by: PATHOLOGY

## 2023-07-11 PROCEDURE — 80048 BASIC METABOLIC PNL TOTAL CA: CPT | Performed by: PATHOLOGY

## 2023-07-11 PROCEDURE — 99214 OFFICE O/P EST MOD 30 MIN: CPT | Performed by: INTERNAL MEDICINE

## 2023-07-11 PROCEDURE — G0463 HOSPITAL OUTPT CLINIC VISIT: HCPCS | Mod: 27 | Performed by: INTERNAL MEDICINE

## 2023-07-11 PROCEDURE — G0463 HOSPITAL OUTPT CLINIC VISIT: HCPCS | Mod: 25

## 2023-07-11 PROCEDURE — 36415 COLL VENOUS BLD VENIPUNCTURE: CPT | Performed by: PATHOLOGY

## 2023-07-11 RX ORDER — METOPROLOL SUCCINATE 25 MG/1
37.5 TABLET, EXTENDED RELEASE ORAL DAILY
Qty: 135 TABLET | Refills: 3 | Status: SHIPPED | OUTPATIENT
Start: 2023-07-11 | End: 2023-07-26

## 2023-07-11 RX ORDER — SACUBITRIL AND VALSARTAN 24; 26 MG/1; MG/1
TABLET, FILM COATED ORAL
Qty: 90 TABLET | Refills: 3 | Status: SHIPPED | OUTPATIENT
Start: 2023-07-11 | End: 2023-08-18 | Stop reason: ALTCHOICE

## 2023-07-11 NOTE — LETTER
"7/11/2023      RE: aKyli Morillo  [address] [facility] [address]       Dear Colleague,    Thank you for the opportunity to participate in the care of your patient, Kayli Morillo, at the [facility] [place] [facility]. Please see a copy of my visit note below.    July 11, 2023     Kayli Morillo  is a 72 year old year old female with a past medical history significant for HFrEF 2/2 NICM, nonobstructive CAD from 3/2023 invasive coronary angiogram, and T2DM who presents for evaluation of chronic systolic heart failure. Kayli initially presented to an OSH earlier in 2023. She was dyspneic, had a \"raspiness\" in her throat and mild lower extremity edema. A brief w/u revealed a pleural effusion and decreased cardiac function by bedside ultrasound. She was then seen in cardiology clinic two weeks ago with an echocardiogram. This echo revealed decreased LVEF of 14%. A subsequent coronary angiogram revealed nonobstructive CAD, and a right heart catheterization revealed normal filling pressures and borderline normal cardiac output/index. Given the lack of ischemic etiology, a cardiac MRI was ordered. After I saw her she has also been followed by CORE clinic.  Other than some recent stomach flu she has been doing well with medications.  Temporarily spironolactone was held and lisinopril was reduced to once a day dosing however was able to increase it back up to 5 mg twice daily dosing and now 5 mg in the morning and 7-1/2 mg in the afternoon.  Today she is here for follow-up.  Notes that she is overall continues to feel tired and fatigued.  Nothing worse than she has been in the past.  She did have these episodes of symptomatic VT when she was kind of floating around having these diaphoretic episodes which resolved spontaneously and they seem to have correlated with the VT episodes were detected on the Zio patch.  Luckily she never fainted.  No chest " "pain no lower extremity edema or other complaints.  Takes her medications as prescribed.     PAST MEDICAL HISTORY:  Past Medical History:   Diagnosis Date    Chronic systolic heart failure (H)     DM2 (diabetes mellitus, type 2) (H)     Gastroesophageal reflux disease without esophagitis     Osteopenia      FAMILY HISTORY:  Family History   Problem Relation Age of Onset    Diabetes Mother     Cerebrovascular Disease Father     Alzheimer Disease Father      SOCIAL HISTORY:  Social History     Socioeconomic History    Marital status:    Tobacco Use    Smoking status: Never     Passive exposure: Past    Smokeless tobacco: Never     CURRENT MEDICATIONS:  Current Outpatient Medications   Medication    aspirin (ASA) 81 MG chewable tablet    atorvastatin (LIPITOR) 10 MG tablet    Calcium Carb-Cholecalciferol 500-10 MG-MCG TABS    empagliflozin (JARDIANCE) 10 MG TABS tablet    lisinopril (ZESTRIL) 5 MG tablet    metFORMIN (GLUCOPHAGE) 500 MG tablet    metoprolol succinate ER (TOPROL XL) 50 MG 24 hr tablet    Multiple Vitamins-Minerals (ICAPS AREDS 2 PO)    spironolactone (ALDACTONE) 25 MG tablet     No current facility-administered medications for this visit.     ROS:   Constitutional: No fever, chills, or sweats.   ENT: No visual disturbance, ear ache, epistaxis, sore throat.   Allergies/Immunologic: Negative.   Respiratory: No cough, hemoptysis.   Cardiovascular: As per HPI.   GI: No nausea, vomiting, hematemesis, melena, or hematochezia.   : No urinary frequency, dysuria, or hematuria.   Integument: Negative.   Psychiatric: Pleasant, no major depression noted  Neuro: No focal neurological deficits noted  Endocrinology: Negative.   Musculoskeletal: As per HPI.      EXAM:  BP 97/54 (BP Location: Right arm, Patient Position: Chair, Cuff Size: Adult Regular)   Pulse 80   Ht 1.499 m (4' 11\")   Wt 59.3 kg (130 lb 11.2 oz)   SpO2 98%   BMI 26.40 kg/m    General: appears comfortable, alert and oriented  Head: " normocephalic, atraumatic  Eyes: anicteric sclera, EOMI , PERRL  Neck: no adenopathy  Orophyarynx: moist mucosa, no lesions noted  Heart: S1/S2 heard, no murmurs, rubs or gallop. Estimated JVP at 5 cmH2O  Lungs: normal breath sounds, no wheezing  Abdomen: soft, non-tender, bowel sounds present, no hepatosplenomegaly  Extremities: No LE edema today  Skin: no open lesions noted  Neuro: grossly non-focal     Labs:  Lab Results   Component Value Date    WBC 7.9 05/11/2023    HGB 13.9 05/11/2023    HCT 44.1 05/11/2023     05/11/2023     05/25/2023    POTASSIUM 4.8 05/25/2023    CHLORIDE 105 05/25/2023    CO2 27 05/25/2023    BUN 20.9 05/25/2023    CR 0.91 05/25/2023     (H) 05/25/2023    NTBNP 1,127 (H) 05/11/2023    AST 17 05/11/2023    ALT 14 05/11/2023    ALKPHOS 83 05/11/2023    BILITOTAL 0.4 05/11/2023    INR 1.01 03/31/2023     Echocardiogram 3/16/23  Moderate left ventricular dilation (LVIDd 6.1cm). Severe diffuse hypokinesis.  Biplane LVEF is 14.3%.  The right ventricle is normal size and function.  Moderate mitral insufficiency is present due to dilated left ventricle.  Moderate pulmonary hypertension. Right ventricular systolic pressure is 52mmHg.  The inferior vena cava was normal in size with preserved respiratory variability.     Zio 4/2023:  79 runs of VT up to 19 beats were noted in addition a few episodes of SVT.     CMRI 5/2023:  1. The left ventricle is severely dilated. There is increased trabeculations in the left ventricle. There is severe global hypokinesis. The systolic function severely reduced. The LVEF is traced at 19%.   2. The right ventricle is normal in cavity size. The global systolic function is normal. The RVEF is 51%.   3. The left atrium is severely enlarged. The right atrium is normal in size.   4. There is at least moderate functional mitral regurgitation.  5. There is mild hyperenhancement in the basal septum in the pattern of mid-myocardial stripe consistent with  non ischemic fibrosis.    T1 mapping - ECV 33% (elevated).   T2 mapping - no myocardial edema present.   6. There is trivial pericardial effusion.  7. There is no intracardiac thrombus.  CONCLUSIONS:   Non-ischemic cardiomyopathy with severe LV dysfunction and normal right ventricular function, LVEF of 19% and RVEF 51%. No evidence of myocardial edema.     ASSESSMENT AND PLAN:  72-year-old lady with above past medical history including nonischemic cardiomyopathy severely reduced ejection fraction 19% LVEF normal at left ventricular function with episodes of symptomatic VT who is here for follow-up.  We discussed that I am a little bit worried about her symptomatic episodes of VT likely she never had a syncopal episode however this can propagate and can be extremely dangerous.  I do feel that she is on maximally tolerated medical therapy at this time given that her EF is extremely low and she does not tolerate medical titration well.  However we will continue to try.  She is agreeable to fairly urgent EP referral for defibrillator placement.  Her QRS is 138 ms.  Chronic systolic heart failure/HFrEF (LV EF 14%) secondary to nonischemic cardiomyopathy NYHA Symptom Class III Stage C, it was 19% on MRI however overall I think this is unchanged.  Primary Cardiologist: Pastor Quarles; Last seen 3/16/2023  ACE-I/ARB/ARNi: currently tolerating lisinopril, we will prescribe Entresto 12.5 mg twice daily dosing given that she has borderline blood pressure values.  She understands the need to stop lisinopril for 48 hours before starting Entresto.  BB: currently tolerating Toprol XL 25 qday, will try to titrate to 37.5 mg daily dosing however she is tired and fatigued and it might make her worse.  She will try to push through.  I think however this would help with her symptomatic VT episodes  Aldosterone antagonist: spironolactone, continue  SCD prophylaxis: I have discussed with the EP team.  We will refer evaluation for  defibrillator considerations.  She is currently on board.  CRT-D can be considered at the time.  Fluid status: euvolemic  Cardiac Rehab:  continue     I will see her back in 3 months or sooner as needed.    I appreciate the opportunity to participate in the care of Kayli Morillo . Please do not hesitate to contact me with any further questions.     Albaro Schilling MD  [facility]  Cardiovascular Division

## 2023-07-11 NOTE — PATIENT INSTRUCTIONS
Dr. Schilling recommends:    Increase Metoprolol Succinate to 37.5 MG daily.    Start taking Entresto 24-26 MG tablets, take 1/2 tablet twice daily.    Stop taking Lisinopril when you start taking Entresto.    Referral sent to Electrophysiologist to assess symptomatic Ventricular Tachycardia.    Follow up clinic visit with Dr. Schilling in 3 months with labs the same day.    Thank you for your visit today.  Please call me with any questions or concerns.   Bhavesh Herrera RN  Cardiology Care Coordinator  [phone]

## 2023-07-23 NOTE — PROGRESS NOTES
Virtual Visit Details    Type of service:  Telephone Visit   Start time 8:43 AM  End time 9:00 AM    HPI:   Ms. Morillo is a 72 year old female with a past medical history including HFrEF 2/2 NICM, nonobstructive CAD from 3/2023 invasive coronary angiogram, and T2DM who presents for evaluation of chronic systolic heart failure.    She was seen by Dr. Schilling on 7/11/23. He felt that she was on maximum tolerated GDMT. She was referred to EP for consideration of ICD. She was switched to very low dose of entresto. She was also having symptomatic episodes of VT.     When she switched over to entresto she felt lightheaded, but that improved after about one week. No SOB at rest.  She has been doing cardiac rehab, she is walking at 1.2 mph for 20 minutes. Her orthopnea has resolved- she is back to sleeping in bed. No more PND. LE edema has resolved. Hard to say about abdominal edema. SBPs have been running mostly in the 90s. She has had one 85/62. No lightheadedness, dizziness, pre-syncope, or syncope. No palpitations. No chest pain. Appetite is normal and she denies early satiety or post-prandial nausea.    Weights had been coming down-today was 123.4 lbs.     She started cardiac rehab this week.    Cardiac Medications  Aldactone 25 mg daily  Metoprolol succinate 25 mg daily  Entresto 12-13 mg BID  Lipitor 10 mg daily  ASA 81 mg daily    PAST MEDICAL HISTORY:  Past Medical History:   Diagnosis Date    Chronic systolic heart failure (H)     DM2 (diabetes mellitus, type 2) (H)     Gastroesophageal reflux disease without esophagitis     Osteopenia        FAMILY HISTORY:  Family History   Problem Relation Age of Onset    Diabetes Mother     Cerebrovascular Disease Father     Alzheimer Disease Father        SOCIAL HISTORY:  Social History     Socioeconomic History    Marital status:    Tobacco Use    Smoking status: Never     Passive exposure: Past    Smokeless tobacco: Never       CURRENT MEDICATIONS:  aspirin (ASA) 81  MG chewable tablet,   atorvastatin (LIPITOR) 10 MG tablet, Take 1 tablet (10 mg) by mouth daily  Calcium Carb-Cholecalciferol 500-10 MG-MCG TABS,   empagliflozin (JARDIANCE) 10 MG TABS tablet, Take 1 tablet (10 mg) by mouth daily (Patient not taking: Reported on 4/25/2023)  metFORMIN (GLUCOPHAGE) 500 MG tablet, 2 times daily (with meals)  metoprolol succinate ER (TOPROL XL) 25 MG 24 hr tablet, Take 1.5 tablets (37.5 mg) by mouth daily  Multiple Vitamins-Minerals (ICAPS AREDS 2 PO),   sacubitril-valsartan (ENTRESTO) 24-26 MG per tablet, Take 1/2 tablet twice daily.  spironolactone (ALDACTONE) 25 MG tablet, Take 1 tablet (25 mg) by mouth daily    No current facility-administered medications on file prior to visit.      ROS:   Refer to HPI    EXAM:  There were no vitals taken for this visit.  N/A video visit    Labs, reviewed with patient in clinic today:  CBC RESULTS:  Lab Results   Component Value Date    WBC 7.9 05/11/2023    RBC 4.76 05/11/2023    HGB 13.9 05/11/2023    HCT 44.1 05/11/2023    MCV 93 05/11/2023    MCH 29.2 05/11/2023    MCHC 31.5 05/11/2023    RDW 13.8 05/11/2023     05/11/2023       CMP RESULTS:  Lab Results   Component Value Date     07/11/2023    POTASSIUM 4.9 07/11/2023    CHLORIDE 105 07/11/2023    CHLORIDE 103 03/23/2023    CO2 28 07/11/2023    ANIONGAP 8 07/11/2023     (H) 07/11/2023     (H) 03/31/2023    BUN 26.8 (H) 07/11/2023    CR 1.02 (H) 07/11/2023    GFRESTIMATED 57 (L) 07/11/2023    JULIANNE 9.6 07/11/2023    BILITOTAL 0.4 05/11/2023    ALBUMIN 4.1 05/11/2023    ALKPHOS 83 05/11/2023    ALT 14 05/11/2023    AST 17 05/11/2023        INR RESULTS:  Lab Results   Component Value Date    INR 1.01 03/31/2023       No results found for: MAG  No results found for: NTBNPI  Lab Results   Component Value Date    NTBNP 707 07/11/2023       Diagnostics:  5/25/23 cMRI  1. The left ventricle is severely dilated. There is increased trabeculations in the left ventricle. There  is  severe global hypokinesis. The systolic function severely reduced. The LVEF is traced at 19%.      2. The right ventricle is normal in cavity size. The global systolic function is normal. The RVEF is 51%.      3. The left atrium is severely enlarged. The right atrium is normal in size.      4. There is at least moderate functional mitral regurgitation.     5. There is mild hyperenhancement in the basal septum in the pattern of mid-myocardial stripe consistent  with non ischemic fibrosis.    T1 mapping - ECV 33% (elevated).   T2 mapping - no myocardial edema present.      6. There is trivial pericardial effusion.     7. There is no intracardiac thrombus.     CONCLUSIONS:   Non-ischemic cardiomyopathy with severe LV dysfunction and normal right ventricular function, LVEF of 19%  and RVEF 51%.  No evidence of myocardial edema.      CORE EXAM    3/16/23 ECHO  Interpretation Summary  Moderate left ventricular dilation (LVIDd 6.1cm). Severe diffuse hypokinesis.  Biplane LVEF is 14.3%.  The right ventricle is normal size and function.  Moderate mitral insufficiency is present due to dilated left ventricle.  Moderate pulmonary hypertension. Right ventricular systolic pressure is 52  mmHg.  The inferior vena cava was normal in size with preserved respiratory  variability.     There is no prior study for direct comparison. Dr. Quarles informed of the  Findings.    3/31/23 Coronary angiogram and RHC  Diagnostic  Dominance: Right  Left Main   The vessel was visualized by selective angiography, is moderate in size and is angiographically normal.      Left Anterior Descending   The vessel is moderate in size.   Prox LAD lesion is 20% stenosed. The lesion is focal.   Prox LAD to Mid LAD lesion is 25% stenosed with 65% stenosed side branch in 1st Diag.      First Diagonal Branch   The vessel is moderate in size.      First Septal Branch   The vessel is small and is angiographically normal.      Second Diagonal Branch   The vessel  is small.      Second Septal Branch   The vessel is small and is angiographically normal.      Third Diagonal Branch   The vessel is small and is angiographically normal.      Left Circumflex   The vessel is moderate in size and is angiographically normal.      First Obtuse Marginal Branch   The vessel is small and is angiographically normal.      Second Obtuse Marginal Branch   The vessel is moderate in size.   2nd Mrg lesion is 40% stenosed.      Lateral Second Obtuse Marginal Branch   The vessel is moderate in size and is angiographically normal.      Third Obtuse Marginal Branch   The vessel is moderate in size and is angiographically normal.      Fourth Obtuse Marginal Branch   The vessel is small and is angiographically normal.      Right Coronary Artery   The vessel was visualized by selective angiography and is moderate in size.   Mid RCA lesion is 20% stenosed.      Acute Marginal Branch   The vessel is moderate in size and is angiographically normal.      Right Ventricular Branch   The vessel is small and is angiographically normal.      Right Posterior Descending Artery   The vessel is moderate in size and is angiographically normal.      Right Posterior Atrioventricular Artery   The vessel is small and is angiographically normal.      NIBP 117/56/78  RA --/--/3  RV 42/3  PA 42/14/24  PCWP --/--/10  PA Sat 62%  West CO/CI 3.5/2.2  TD CO/CI 3.6/2.3  SVR 1444 (TD) dynes  PVR 3.6 (TD) AMOR     Right sided filling pressures are normal. Left sided filling pressures are normal. Mild elevated pulmonary hypertension. Normal cardiac output level. Hemodynamic data has been modified in Epic per physician review.      Assessment and Plan:   Ms. Morillo is a 72 year old female with a past medical history including HFrEF 2/2 NICM, nonobstructive CAD from 3/2023 invasive coronary angiogram, and T2DM who presents for evaluation of chronic systolic heart failure.    She has a recent HF diagnosis. I do think that her  medications are maximized given her soft blood pressures. She has been referred for ICD. She sounds euvolemic by history. Tolerating current medication, but we don't have room for more currently.    # Chronic systolic heart failure/HFrEF secondary to NICM. EF 15-20%   Stage C. NYHA Class III.    Fluid status: euvolemic by history, off loop diuretics   ACEi/ARB/ARNI: continue entresto 12-13 mg BID  BB: ongoing titration, for now, continue metoprolol xl 25 mg daily  Aldosterone: aldactone 25 mg daily until Monday, then resume   SGLT2i- cost prohibitive  SCD prophylaxis: referred to EP for consideration of ICD by Dr. Schilling  NSAID use: contraindicated  Sleep apnea evaluation: referred to sleep clinic (outside referall)  Remote monitoring: Would consider in the future if more volume concerns, currently I don't think this is needed  Other: Will need ECHO 90 days after achieving maximum doses    # T2DM  - Will need to communicate with her PCP if we are able to add an SGLT2i (she is on metformin)    # Non-obstructive coronary artery disease as per 2023 angiogram  - Continue ASA and lipitor    # CKD stage 2  - Cr 0.93, stable with recent b/l      Follow up   - Dr Samson to discuss ICD on 8/23 as scheduled.   - CORE on 8/23  - Dr Schilling in October as scheduled       17 minutes spent on phone call with patient.    Eden Pugh PA-C  [facility] Cardiology          CC  [employer]

## 2023-07-25 ENCOUNTER — TRANSFERRED RECORDS (OUTPATIENT)
Dept: HEALTH INFORMATION MANAGEMENT | Facility: CLINIC | Age: 73
End: 2023-07-25
Payer: MEDICARE

## 2023-07-25 ENCOUNTER — TELEPHONE (OUTPATIENT)
Dept: CARDIOLOGY | Facility: CLINIC | Age: 73
End: 2023-07-25
Payer: MEDICARE

## 2023-07-25 DIAGNOSIS — I50.20 HEART FAILURE WITH REDUCED EJECTION FRACTION, NYHA CLASS III (H): Primary | ICD-10-CM

## 2023-07-25 NOTE — TELEPHONE ENCOUNTER
[facility] Call Center    Phone Message    May a detailed message be left on voicemail: yes     Reason for Call: Other: Pt has an appointment tomorrow and needs lab orders from Dr. Schilling faxed to [facility] fax # is [phone]. Pt requesting a confirmation call to know when this has been done.     Action Taken: Other: Cardiology    Travel Screening: Not Applicable    Thank you!  [facility]

## 2023-07-25 NOTE — TELEPHONE ENCOUNTER
Chart reviewed; pt has return CORE telephone appt with Justina tomorrow 7/26.     Pt saw Dr. Schilling on 7/11/23 with medication changes. Pt to have labs drawn at [facility] prior to visit with [facility]. No record of labs being sent to [facility].      Called [facility] and spoke with Margaret BORGES; Lab orders faxed and received. Margaret states they will draw her labs today and fax results back asap.      Called pt; she will head down to lab (in [facility]) now.     FYI to Sruthi and Justina.     Debby Aj RN

## 2023-07-26 ENCOUNTER — VIRTUAL VISIT (OUTPATIENT)
Dept: CARDIOLOGY | Facility: CLINIC | Age: 73
End: 2023-07-26
Attending: PHYSICIAN ASSISTANT
Payer: MEDICARE

## 2023-07-26 VITALS — WEIGHT: 123.4 LBS | BODY MASS INDEX: 24.88 KG/M2 | HEIGHT: 59 IN

## 2023-07-26 DIAGNOSIS — I47.29 PAROXYSMAL VENTRICULAR TACHYCARDIA (H): ICD-10-CM

## 2023-07-26 DIAGNOSIS — I49.3 PVC'S (PREMATURE VENTRICULAR CONTRACTIONS): ICD-10-CM

## 2023-07-26 DIAGNOSIS — I50.20 HEART FAILURE WITH REDUCED EJECTION FRACTION, NYHA CLASS III (H): ICD-10-CM

## 2023-07-26 DIAGNOSIS — I42.8 NONISCHEMIC CARDIOMYOPATHY (H): ICD-10-CM

## 2023-07-26 DIAGNOSIS — I10 BENIGN ESSENTIAL HYPERTENSION: ICD-10-CM

## 2023-07-26 DIAGNOSIS — E78.2 MIXED HYPERLIPIDEMIA: ICD-10-CM

## 2023-07-26 PROCEDURE — 99442 PR PHYSICIAN TELEPHONE EVALUATION 11-20 MIN: CPT | Mod: 95 | Performed by: PHYSICIAN ASSISTANT

## 2023-07-26 RX ORDER — METOPROLOL SUCCINATE 25 MG/1
25 TABLET, EXTENDED RELEASE ORAL DAILY
Qty: 135 TABLET | Refills: 3 | COMMUNITY
Start: 2023-07-26 | End: 2023-12-22

## 2023-07-26 ASSESSMENT — PAIN SCALES - GENERAL: PAINLEVEL: NO PAIN (0)

## 2023-07-26 NOTE — TELEPHONE ENCOUNTER
[facility] Call Center    Phone Message    May a detailed message be left on voicemail: yes     Reason for Call: Other: [facility] calling to advise they received verbal orders but they need signed orders. Please return call if needed.      Action Taken: Other: Cardiology    Travel Screening: Not Applicable    Thank you!  Special[facility] Center

## 2023-07-26 NOTE — PATIENT INSTRUCTIONS
Take your medicines every day, as directed    Changes made today:           - No medication changes   Monitor Your Weight and Symptoms    Contact us if you:    Gain 2 pounds in one day or 5 pounds in one week  Feel more short of breath  Notice more leg swelling  Feel lightheadeded   Change your lifestyle    Limit Salt or Sodium:  2000 mg  Limit Fluids:  2000 mL or approximately 64 ounces  Eat a Heart Healthy Diet  Low in saturated fats  Stay Active:  Aim to move at least 150 minutes every  week         To Contact us    During Business Hours:  [phone], option # 1      After hours, weekends or holidays:   [phone], Option #4  Ask to speak to the On-Call Cardiologist. Inform them you are a CORE/heart failure patient at the [facility].     Use [employer] allows you to communicate directly with your heart team through secure messaging.  [employer] can be accessed any time on your phone, computer, or tablet.  If you need assistance, we'd be happy to help!         Keep your Heart Appointments:    Dr Samson to discuss ICD on 8/23 as scheduled.   We added a CORE appt that day, so your schedule will be:  11:30 labs  12:00 Dr Samson  1:00 Justina in C[facility] Clinic     Dr Schilling in October as scheduled      Please consider attending our virtual support group which is held monthly. Please reach out to Olvin at [phone] for more information if you are interested in attending.       2023 dates:    Monday, August 7th, 1-2pm  Monday, September 11th, 1-2pm  Monday, October 2nd, 1-2pm  Monday, November 6th, 1-2pm  Monday, December 4th, 1-2pm

## 2023-07-26 NOTE — Clinical Note
7/26/2023      RE: Kayli Cortesbrando  [address] [address]       Dear Colleague,    Thank you for the opportunity to participate in the care of your patient, Kayli Morillo, at the [facility]. Please see a copy of my visit note below.    No notes on file    Please do not hesitate to contact me if you have any questions/concerns.     Sincerely,     Eden Pugh PA-C

## 2023-07-26 NOTE — NURSING NOTE
Patient confirms medications and allergies are accurate via patients echeck in completion, and or denies any changes since last reviewed/verified.     Laxmi Velasquez, Virtual Facilitator  Is the patient currently in the state of [place]? YES    Visit mode:TELEPHONE    If the visit is dropped, the patient can be reconnected by: TELEPHONE VISIT: Phone number: [phone]    Will anyone else be joining the visit? NO      How would you like to obtain your AVS? MyChart    Are changes needed to the allergy or medication list? NO    Reason for visit: RECHECK

## 2023-07-27 NOTE — TELEPHONE ENCOUNTER
Called [facility] back. They got faxed orders for October visit with Dr. Schilling but they need a physical signature on them. Noted clinic visit in October is in person and will get labs at our facility. Let Luisa Gayle know they can disregard those faxed lab orders and we will get at our own clinic. Usha Martino RN

## 2023-07-31 NOTE — PROGRESS NOTES
ELECTROPHYSIOLOGY CLINIC VISIT    Assessment/Recommendations   Assessment/Plan:    Kayli Morillo is a 72 year old female with past medical history significant for HFrEF 2/2 NICM, nonobstructive CAD from 3/2023 invasive coronary angiogram, and type II diabetes.       HFrEF 2/2 NICM, NYHA Symptom Class III Stage C  NSVT   ACE-I/ARB/ARNi: Continue entresto 12-13 mg twice daily   BB: Continue Toprol XL 25 mg daily. Had fatigue on increased dose of 37.5 mg daily   Aldosterone antagonist: Continue spironolactone 25 mg daily    SCD prophylaxis: re-assess after 3 months of GDMT with repeat echo. On maximum tolerated GDMT ~2 weeks now. Discussed ICD procedure in detail with patient and answered questions. Discussed if her LVEF improves on echo in 3 months we will continue with medical management. NSVT runs not significant enough to warrant secondary prevention ICD implant or further treatment at this time. EKG today sinus with a  ms without left bundle. Wouldn't recommend CRT at this time, would proceed with ICD implant if LVEF remains decreased.   %BiV pacing: N/A  Fluid status: euvolemic    Follow up in 3 months with repeat echo prior.      History of Present Illness/Subjective    MsJames Morillo is a 72 year old female who comes in today for EP consultation for device implant.    She was initially seen by Dr Schilling on 4/24/23. Prior to his she had been admitted to OSH in March with dyspnea and mild peripheral edema. Work up during this admission demonstrated pleural effusion and decreased LVEF. Echo on 3/16/23 revealed decreased LVEF of 14%. A subsequent coronary angiogram revealed nonobstructive CAD. A right heart cath was then done on 3/16 this demonstrated normal filling pressures and borderline normal cardiac output. She then had cardiac MRI done fur further evaluation of decreased LVEF on 5/25 which demonstrated NICM with severe LV dysfunction and normal RV function, LVEF of 19% and RVEF 51%. When  she has last seen by the heart failure team, her symptoms had been improving. Dr Schilling had ordered a ziopatch for further evaluation of arrhythmias that could be contributing to her decreased systolic function. That ziopatch demonstrated sinus rhythm with occasional NSVT runs.     She reports feeling well. She denies chest discomfort, palpitations, abdominal fullness/bloating or peripheral edema, shortness of breath, paroxysmal nocturnal dyspnea, orthopnea, lightheadedness, dizziness, pre-syncope, or syncope. Presenting 12 lead ECG shows sinus with PVCs Vent Rate 68 bpm,  ms,  ms, QTc 440 ms. Current cardiac medications include: ASA, lipitor, toprol XL, entresto and aldactone.     I have reviewed and updated the patient's Past Medical History, Social History, Family History and Medication List.     Cardiographics (Personally Reviewed) :   Echo: 3/16/23  Interpretation Summary  Moderate left ventricular dilation (LVIDd 6.1cm). Severe diffuse hypokinesis.  Biplane LVEF is 14.3%.  The right ventricle is normal size and function.  Moderate mitral insufficiency is present due to dilated left ventricle.  Moderate pulmonary hypertension. Right ventricular systolic pressure is 52  mmHg.  The inferior vena cava was normal in size with preserved respiratory  variability.  There is no prior study for direct comparison. Dr. Quarles informed of the  findings.    Ziopatch: 4/25/23-5/9/23  Sinus 55 -168 bpm. 79 NSVT runs, avg 127 bpm. 2.6% PVC burden.     Coronary Angiogram/RHC 3/31/23  Right sided filling pressures are normal.  Left sided filling pressures are normal.  Mild elevated pulmonary hypertension.  Normal cardiac output level.  Hemodynamic data has been modified in Epic per physician review.  Mild to moderate non obstructive CAD.  Non ischemic cardiomyopathy.    CMR: 5/25/23  1. The left ventricle is severely dilated. There is increased trabeculations in the left ventricle. There  is severe global  "hypokinesis. The systolic function severely reduced. The LVEF is traced at 19%.   2. The right ventricle is normal in cavity size. The global systolic function is normal. The RVEF is 51%.   3. The left atrium is severely enlarged. The right atrium is normal in size.   4. There is at least moderate functional mitral regurgitation.  5. There is mild hyperenhancement in the basal septum in the pattern of mid-myocardial stripe consistent  with non ischemic fibrosis.    T1 mapping - ECV 33% (elevated).   T2 mapping - no myocardial edema present.   6. There is trivial pericardial effusion.  7. There is no intracardiac thrombus.  CONCLUSIONS:   Non-ischemic cardiomyopathy with severe LV dysfunction and normal right ventricular function, LVEF of 19%  and RVEF 51%.  No evidence of myocardial edema.        Physical Examination   There were no vitals taken for this visit.  Wt Readings from Last 3 Encounters:   07/26/23 56 kg (123 lb 6.4 oz)   07/11/23 59.3 kg (130 lb 11.2 oz)   05/11/23 61.6 kg (135 lb 14.4 oz)   /68 (BP Location: Right arm, Patient Position: Chair, Cuff Size: Adult Regular)   Pulse 68   Ht 1.529 m (5' 0.2\")   Wt 57.7 kg (127 lb 1.6 oz)   SpO2 98%   BMI 24.66 kg/m      General Appearance:   Alert, well-appearing and in no acute distress.   HEENT: Atraumatic, normocephalic. MMM.   Chest/Lungs:   Respirations unlabored.  Lungs are clear to auscultation.   Cardiovascular:   Regular rate and rhythm. S1/S2. No murmur.    Abdomen:  Soft, nontender, nondistended.   Extremities: No cyanosis or clubbing. No edema.    Musculoskeletal: Moves all extremities. Gait normal.   Skin: Warm, dry, intact.    Neurologic: Mood and affect are appropriate. Alert and oriented to person, place, time, and situation.          Medications  Allergies   Current Outpatient Medications   Medication    aspirin (ASA) 81 MG chewable tablet    atorvastatin (LIPITOR) 10 MG tablet    Calcium Carb-Cholecalciferol 500-10 MG-MCG TABS    " metFORMIN (GLUCOPHAGE) 500 MG tablet    metoprolol succinate ER (TOPROL XL) 25 MG 24 hr tablet    Multiple Vitamins-Minerals (ICAPS AREDS 2 PO)    sacubitril-valsartan (ENTRESTO) 24-26 MG per tablet    spironolactone (ALDACTONE) 25 MG tablet     No current facility-administered medications for this visit.      No Known Allergies      Lab Results (Personally Reviewed)    Chemistry/lipid CBC Cardiac Enzymes/BNP/TSH/INR   Lab Results   Component Value Date    BUN 26.8 (H) 07/11/2023     07/11/2023    CO2 28 07/11/2023     Creatinine   Date Value Ref Range Status   07/11/2023 1.02 (H) 0.51 - 0.95 mg/dL Final       No results found for: CHOL, HDL, LDL, CHOLHDL   Lab Results   Component Value Date    WBC 7.9 05/11/2023    HGB 13.9 05/11/2023    HCT 44.1 05/11/2023    MCV 93 05/11/2023     05/11/2023    Lab Results   Component Value Date    TSH 0.64 03/16/2023    INR 1.01 03/31/2023        The patient states understanding and is agreeable with the plan.     Darlene Barahona PA-C  [facility]  Electrophysiology Consult Service  Pager: [phone]    I spent a total of 30 minutes face to face with Kayli Morillo during today's office visit. I have spent an additional 35 minutes today on chart review and documentation. Total 65 min.    EP STAFF NOTE  I have seen and examined the patient as part of a shared visit PARESH Burrell.  I agree with the note above. I reviewed today's vital signs and medications. I have reviewed and discussed with the advanced practice provider their physical examination, assessment, and plan   Briefly, NICM, referred for consideration of ICD, one NSV on zio  My key history/exam findings are: RRR.   The key management decisions made by me: ICD if EF does not recover.  Total time spent on patient visit, reviewing notes, imaging, labs, orders, and completing necessary documentation: 25 minutes.  >50% of visit spent on counseling patient and/or coordination of care.      Donte Samson MD [facility]  Cardiology - Electrophysiology

## 2023-08-01 ENCOUNTER — OFFICE VISIT (OUTPATIENT)
Dept: CARDIOLOGY | Facility: CLINIC | Age: 73
End: 2023-08-01
Payer: MEDICARE

## 2023-08-01 VITALS
WEIGHT: 127.1 LBS | SYSTOLIC BLOOD PRESSURE: 118 MMHG | OXYGEN SATURATION: 98 % | HEIGHT: 60 IN | HEART RATE: 68 BPM | DIASTOLIC BLOOD PRESSURE: 68 MMHG | BODY MASS INDEX: 24.95 KG/M2

## 2023-08-01 DIAGNOSIS — I42.8 NONISCHEMIC CARDIOMYOPATHY (H): Primary | ICD-10-CM

## 2023-08-01 PROCEDURE — 93010 ELECTROCARDIOGRAM REPORT: CPT | Performed by: INTERNAL MEDICINE

## 2023-08-01 PROCEDURE — 99215 OFFICE O/P EST HI 40 MIN: CPT

## 2023-08-01 PROCEDURE — 93005 ELECTROCARDIOGRAM TRACING: CPT

## 2023-08-01 PROCEDURE — G0463 HOSPITAL OUTPT CLINIC VISIT: HCPCS

## 2023-08-01 ASSESSMENT — PAIN SCALES - GENERAL: PAINLEVEL: NO PAIN (0)

## 2023-08-01 NOTE — NURSING NOTE
Chief Complaint   Patient presents with    Follow Up     CRTD implant consult per Dr Schilling. Review zio- NSVT          Vitals were taken, medications reconciled.    Litzy Patiño, Facilitator   8:30 AM  Vitals were taken. Medications reconciled.   Litzy Patiño - Visit Facilitator

## 2023-08-01 NOTE — PATIENT INSTRUCTIONS
Plan:    Follow-up in 3 months with JOSIAH Sesay with an echo prior      Your Care Team:  EP Cardiology   Telephone Number     Pamela Sloan RN [phone]    After business hours: [phone], ask for cardiologist on-call   Non-procedure scheduling:    Leana COFFMAN   [phone]   Procedure scheduling:    Love Quintanilla   [phone]   Device Clinic (Pacemakers, ICDs, Loop Recorders)    During business hours: [phone]  After business hours:   [phone]- select option 4 and ask for job code [id number].       Cardiovascular Clinic:   [address] [place] [address]. [place] [address]      As always, thank you for trusting us with your health care needs!

## 2023-08-01 NOTE — LETTER
8/1/2023      RE: Kayli Morillo  [address] [facility] [address]       Dear Colleague,    Thank you for the opportunity to participate in the care of your patient, Kayli Morillo, at the [facility] [place] [facility]. Please see a copy of my visit note below.        ELECTROPHYSIOLOGY CLINIC VISIT    Assessment/Recommendations   Assessment/Plan:    Kayli Morillo is a 72 year old female with past medical history significant for HFrEF 2/2 NICM, nonobstructive CAD from 3/2023 invasive coronary angiogram, and type II diabetes.       HFrEF 2/2 NICM, NYHA Symptom Class III Stage C  NSVT   ACE-I/ARB/ARNi: Continue entresto 12-13 mg twice daily   BB: Continue Toprol XL 25 mg daily. Had fatigue on increased dose of 37.5 mg daily   Aldosterone antagonist: Continue spironolactone 25 mg daily    SCD prophylaxis: re-assess after 3 months of GDMT with repeat echo. On maximum tolerated GDMT ~2 weeks now. Discussed ICD procedure in detail with patient and answered questions. Discussed if her LVEF improves on echo in 3 months we will continue with medical management. NSVT runs not significant enough to warrant secondary prevention ICD implant or further treatment at this time. EKG today sinus with a  ms without left bundle. Wouldn't recommend CRT at this time, would proceed with ICD implant if LVEF remains decreased.   %BiV pacing: N/A  Fluid status: euvolemic    Follow up in 3 months with repeat echo prior.      History of Present Illness/Subjective    MsJames Morillo is a 72 year old female who comes in today for EP consultation for device implant.    She was initially seen by Dr Schilling on 4/24/23. Prior to his she had been admitted to OS in March with dyspnea and mild peripheral edema. Work up during this admission demonstrated pleural effusion and decreased LVEF. Echo on 3/16/23 revealed decreased LVEF of 14%. A subsequent coronary  angiogram revealed nonobstructive CAD. A right heart cath was then done on 3/16 this demonstrated normal filling pressures and borderline normal cardiac output. She then had cardiac MRI done fur further evaluation of decreased LVEF on 5/25 which demonstrated NICM with severe LV dysfunction and normal RV function, LVEF of 19% and RVEF 51%. When she has last seen by the heart failure team, her symptoms had been improving. Dr Schilling had ordered a ziopatch for further evaluation of arrhythmias that could be contributing to her decreased systolic function. That ziopatch demonstrated sinus rhythm with occasional NSVT runs.     She reports feeling well. She denies chest discomfort, palpitations, abdominal fullness/bloating or peripheral edema, shortness of breath, paroxysmal nocturnal dyspnea, orthopnea, lightheadedness, dizziness, pre-syncope, or syncope. Presenting 12 lead ECG shows sinus with PVCs Vent Rate 68 bpm,  ms,  ms, QTc 440 ms. Current cardiac medications include: ASA, lipitor, toprol XL, entresto and aldactone.     I have reviewed and updated the patient's Past Medical History, Social History, Family History and Medication List.     Cardiographics (Personally Reviewed) :   Echo: 3/16/23  Interpretation Summary  Moderate left ventricular dilation (LVIDd 6.1cm). Severe diffuse hypokinesis.  Biplane LVEF is 14.3%.  The right ventricle is normal size and function.  Moderate mitral insufficiency is present due to dilated left ventricle.  Moderate pulmonary hypertension. Right ventricular systolic pressure is 52  mmHg.  The inferior vena cava was normal in size with preserved respiratory  variability.  There is no prior study for direct comparison. Dr. Quarles informed of the  findings.    Ziopatch: 4/25/23-5/9/23  Sinus 55 -168 bpm. 79 NSVT runs, avg 127 bpm. 2.6% PVC burden.     Coronary Angiogram/RHC 3/31/23  Right sided filling pressures are normal.  Left sided filling pressures are normal.  Mild  "elevated pulmonary hypertension.  Normal cardiac output level.  Hemodynamic data has been modified in Epic per physician review.  Mild to moderate non obstructive CAD.  Non ischemic cardiomyopathy.    CMR: 5/25/23  1. The left ventricle is severely dilated. There is increased trabeculations in the left ventricle. There  is severe global hypokinesis. The systolic function severely reduced. The LVEF is traced at 19%.   2. The right ventricle is normal in cavity size. The global systolic function is normal. The RVEF is 51%.   3. The left atrium is severely enlarged. The right atrium is normal in size.   4. There is at least moderate functional mitral regurgitation.  5. There is mild hyperenhancement in the basal septum in the pattern of mid-myocardial stripe consistent  with non ischemic fibrosis.    T1 mapping - ECV 33% (elevated).   T2 mapping - no myocardial edema present.   6. There is trivial pericardial effusion.  7. There is no intracardiac thrombus.  CONCLUSIONS:   Non-ischemic cardiomyopathy with severe LV dysfunction and normal right ventricular function, LVEF of 19%  and RVEF 51%.  No evidence of myocardial edema.        Physical Examination   There were no vitals taken for this visit.  Wt Readings from Last 3 Encounters:   07/26/23 56 kg (123 lb 6.4 oz)   07/11/23 59.3 kg (130 lb 11.2 oz)   05/11/23 61.6 kg (135 lb 14.4 oz)   /68 (BP Location: Right arm, Patient Position: Chair, Cuff Size: Adult Regular)   Pulse 68   Ht 1.529 m (5' 0.2\")   Wt 57.7 kg (127 lb 1.6 oz)   SpO2 98%   BMI 24.66 kg/m      General Appearance:   Alert, well-appearing and in no acute distress.   HEENT: Atraumatic, normocephalic. MMM.   Chest/Lungs:   Respirations unlabored.  Lungs are clear to auscultation.   Cardiovascular:   Regular rate and rhythm. S1/S2. No murmur.    Abdomen:  Soft, nontender, nondistended.   Extremities: No cyanosis or clubbing. No edema.    Musculoskeletal: Moves all extremities. Gait normal. "   Skin: Warm, dry, intact.    Neurologic: Mood and affect are appropriate. Alert and oriented to person, place, time, and situation.          Medications  Allergies   Current Outpatient Medications   Medication    aspirin (ASA) 81 MG chewable tablet    atorvastatin (LIPITOR) 10 MG tablet    Calcium Carb-Cholecalciferol 500-10 MG-MCG TABS    metFORMIN (GLUCOPHAGE) 500 MG tablet    metoprolol succinate ER (TOPROL XL) 25 MG 24 hr tablet    Multiple Vitamins-Minerals (ICAPS AREDS 2 PO)    sacubitril-valsartan (ENTRESTO) 24-26 MG per tablet    spironolactone (ALDACTONE) 25 MG tablet     No current facility-administered medications for this visit.      No Known Allergies      Lab Results (Personally Reviewed)    Chemistry/lipid CBC Cardiac Enzymes/BNP/TSH/INR   Lab Results   Component Value Date    BUN 26.8 (H) 07/11/2023     07/11/2023    CO2 28 07/11/2023     Creatinine   Date Value Ref Range Status   07/11/2023 1.02 (H) 0.51 - 0.95 mg/dL Final       No results found for: CHOL, HDL, LDL, CHOLHDL   Lab Results   Component Value Date    WBC 7.9 05/11/2023    HGB 13.9 05/11/2023    HCT 44.1 05/11/2023    MCV 93 05/11/2023     05/11/2023    Lab Results   Component Value Date    TSH 0.64 03/16/2023    INR 1.01 03/31/2023        The patient states understanding and is agreeable with the plan.     Darlene Barahona PA-C  [facility]  Electrophysiology Consult Service  Pager: [phone]    I spent a total of 30 minutes face to face with Kayli Morillo during today's office visit. I have spent an additional 35 minutes today on chart review and documentation.

## 2023-08-02 LAB
ATRIAL RATE - MUSE: 68 BPM
DIASTOLIC BLOOD PRESSURE - MUSE: NORMAL MMHG
INTERPRETATION ECG - MUSE: NORMAL
P AXIS - MUSE: 64 DEGREES
PR INTERVAL - MUSE: 148 MS
QRS DURATION - MUSE: 130 MS
QT - MUSE: 414 MS
QTC - MUSE: 440 MS
R AXIS - MUSE: -50 DEGREES
SYSTOLIC BLOOD PRESSURE - MUSE: NORMAL MMHG
T AXIS - MUSE: -66 DEGREES
VENTRICULAR RATE- MUSE: 68 BPM

## 2023-08-10 DIAGNOSIS — I50.20 HEART FAILURE WITH REDUCED EJECTION FRACTION, NYHA CLASS III (H): Primary | ICD-10-CM

## 2023-08-13 ENCOUNTER — HEALTH MAINTENANCE LETTER (OUTPATIENT)
Age: 73
End: 2023-08-13

## 2023-08-16 ENCOUNTER — TELEPHONE (OUTPATIENT)
Dept: CARDIOLOGY | Facility: CLINIC | Age: 73
End: 2023-08-16
Payer: MEDICARE

## 2023-08-16 DIAGNOSIS — I50.20 HEART FAILURE WITH REDUCED EJECTION FRACTION, NYHA CLASS III (H): Primary | ICD-10-CM

## 2023-08-16 NOTE — TELEPHONE ENCOUNTER
[facility] Call [facility]    Phone Message    May a detailed message be left on voicemail: yes     Reason for Call: Other: Patient called in wanting to speak with care team regarding medication sacubitril-valsartan (ENTRESTO) 24-26 MG per tablet  please call pt back for further discussion.      Action Taken: Other: cardiology    Travel Screening: Not Applicable    Thank you!  [facility]                Called and spoke with patient. Her Entresto runs out his Friday, she had used a coupon to make it affordable to her. She has applied for financial assistance and has been told she will receive the Entresto from them in about a month. She is wondering if she should go back on the Lisinopril while she is waiting for the Entresto to arrive in about a month. Patient is taking Entresto 24-26 mG tqblet, take 1/2 a tablet twice daily. She was on Lisinopril 5 MG daily. Will route to provider  for recommendations. Patient is agreeable to plan.                      Dr. Schilling states ok to go back to Lisinopril 5 MG daily for now while patient is waiting  for her Entresto to arrive.

## 2023-08-18 RX ORDER — LISINOPRIL 5 MG/1
5 TABLET ORAL DAILY
Qty: 90 TABLET | Refills: 3 | COMMUNITY
Start: 2023-08-18 | End: 2023-08-29

## 2023-08-22 NOTE — PROGRESS NOTES
HPI:   Ms. Morillo is a 72 year old female with a past medical history including HFrEF 2/2 NICM, nonobstructive CAD from 3/2023 invasive coronary angiogram, and T2DM who presents for evaluation of chronic systolic heart failure.    She was seen by Dr. Schilling on 7/11/23. He felt that she was on maximum tolerated GDMT. She was referred to EP for consideration of ICD. She was switched to very low dose of entresto. She was also having symptomatic episodes of VT.     No SOB at rest.  She has been doing cardiac rehab, she is walking at 1.5 mph for 30 minutes. She felt CERON once with that but no other CERON with cardiac rehab or at home. Her orthopnea has resolved- she is back to sleeping in bed. No more PND. No LE edema. No abdominal edema. SBPs have been running mostly in the 90s-100s. Sometimes in the 80s- happens at least once per week. Hrs are 60s-70s.. No lightheadedness, dizziness, pre-syncope, or syncope. No palpitations. No chest pain. Appetite is normal and she denies early satiety or post-prandial nausea.    Weights was 121.4 at home today. The lowest it has been is 120.4.    Cardiac Medications  Aldactone 25 mg daily  Metoprolol succinate 25 mg daily  Lisinopril 5 mg BID  Lipitor 20 mg daily  ASA 81 mg daily    PAST MEDICAL HISTORY:  Past Medical History:   Diagnosis Date    Chronic systolic heart failure (H)     DM2 (diabetes mellitus, type 2) (H)     Gastroesophageal reflux disease without esophagitis     Osteopenia        FAMILY HISTORY:  Family History   Problem Relation Age of Onset    Diabetes Mother     Cerebrovascular Disease Father     Alzheimer Disease Father        SOCIAL HISTORY:  Social History     Socioeconomic History    Marital status:    Tobacco Use    Smoking status: Never     Passive exposure: Past    Smokeless tobacco: Never       CURRENT MEDICATIONS:  aspirin (ASA) 81 MG chewable tablet,   atorvastatin (LIPITOR) 10 MG tablet, Take 1 tablet (10 mg) by mouth daily  Calcium  Carb-Cholecalciferol 500-10 MG-MCG TABS,   lisinopril (ZESTRIL) 5 MG tablet, Take 1 tablet (5 mg) by mouth daily  metFORMIN (GLUCOPHAGE) 500 MG tablet, 2 times daily (with meals)  metoprolol succinate ER (TOPROL XL) 25 MG 24 hr tablet, Take 1 tablet (25 mg) by mouth daily  Multiple Vitamins-Minerals (ICAPS AREDS 2 PO),   spironolactone (ALDACTONE) 25 MG tablet, Take 1 tablet (25 mg) by mouth daily    No current facility-administered medications on file prior to visit.      ROS:   Refer to HPI    EXAM:  There were no vitals taken for this visit.  N/A video visit    Labs, reviewed with patient in clinic today:  CBC RESULTS:  Lab Results   Component Value Date    WBC 7.9 05/11/2023    RBC 4.76 05/11/2023    HGB 13.9 05/11/2023    HCT 44.1 05/11/2023    MCV 93 05/11/2023    MCH 29.2 05/11/2023    MCHC 31.5 05/11/2023    RDW 13.8 05/11/2023     05/11/2023       CMP RESULTS:  Lab Results   Component Value Date     07/11/2023    POTASSIUM 4.9 07/11/2023    CHLORIDE 102 07/25/2023    CO2 28 07/11/2023    ANIONGAP 8 07/11/2023     (H) 07/11/2023     (H) 03/31/2023    BUN 26.8 (H) 07/11/2023    CR 1.02 (H) 07/11/2023    GFRESTIMATED 57 (L) 07/11/2023    JULIANNE 9.6 07/11/2023    BILITOTAL 0.4 05/11/2023    ALBUMIN 4.1 05/11/2023    ALKPHOS 83 05/11/2023    ALT 14 05/11/2023    AST 17 05/11/2023        INR RESULTS:  Lab Results   Component Value Date    INR 1.01 03/31/2023       No results found for: MAG  No results found for: NTBNPI  Lab Results   Component Value Date    NTBNP 707 07/11/2023       Diagnostics:  5/25/23 cMRI  1. The left ventricle is severely dilated. There is increased trabeculations in the left ventricle. There  is severe global hypokinesis. The systolic function severely reduced. The LVEF is traced at 19%.      2. The right ventricle is normal in cavity size. The global systolic function is normal. The RVEF is 51%.      3. The left atrium is severely enlarged. The right atrium is  normal in size.      4. There is at least moderate functional mitral regurgitation.     5. There is mild hyperenhancement in the basal septum in the pattern of mid-myocardial stripe consistent  with non ischemic fibrosis.    T1 mapping - ECV 33% (elevated).   T2 mapping - no myocardial edema present.      6. There is trivial pericardial effusion.     7. There is no intracardiac thrombus.     CONCLUSIONS:   Non-ischemic cardiomyopathy with severe LV dysfunction and normal right ventricular function, LVEF of 19%  and RVEF 51%.  No evidence of myocardial edema.      CORE EXAM    3/16/23 ECHO  Interpretation Summary  Moderate left ventricular dilation (LVIDd 6.1cm). Severe diffuse hypokinesis.  Biplane LVEF is 14.3%.  The right ventricle is normal size and function.  Moderate mitral insufficiency is present due to dilated left ventricle.  Moderate pulmonary hypertension. Right ventricular systolic pressure is 52  mmHg.  The inferior vena cava was normal in size with preserved respiratory  variability.     There is no prior study for direct comparison. Dr. Quarles informed of the  Findings.    3/31/23 Coronary angiogram and RHC  Diagnostic  Dominance: Right  Left Main   The vessel was visualized by selective angiography, is moderate in size and is angiographically normal.      Left Anterior Descending   The vessel is moderate in size.   Prox LAD lesion is 20% stenosed. The lesion is focal.   Prox LAD to Mid LAD lesion is 25% stenosed with 65% stenosed side branch in 1st Diag.      First Diagonal Branch   The vessel is moderate in size.      First Septal Branch   The vessel is small and is angiographically normal.      Second Diagonal Branch   The vessel is small.      Second Septal Branch   The vessel is small and is angiographically normal.      Third Diagonal Branch   The vessel is small and is angiographically normal.      Left Circumflex   The vessel is moderate in size and is angiographically normal.      First  Obtuse Marginal Branch   The vessel is small and is angiographically normal.      Second Obtuse Marginal Branch   The vessel is moderate in size.   2nd Mrg lesion is 40% stenosed.      Lateral Second Obtuse Marginal Branch   The vessel is moderate in size and is angiographically normal.      Third Obtuse Marginal Branch   The vessel is moderate in size and is angiographically normal.      Fourth Obtuse Marginal Branch   The vessel is small and is angiographically normal.      Right Coronary Artery   The vessel was visualized by selective angiography and is moderate in size.   Mid RCA lesion is 20% stenosed.      Acute Marginal Branch   The vessel is moderate in size and is angiographically normal.      Right Ventricular Branch   The vessel is small and is angiographically normal.      Right Posterior Descending Artery   The vessel is moderate in size and is angiographically normal.      Right Posterior Atrioventricular Artery   The vessel is small and is angiographically normal.      NIBP 117/56/78  RA --/--/3  RV 42/3  PA 42/14/24  PCWP --/--/10  PA Sat 62%  West CO/CI 3.5/2.2  TD CO/CI 3.6/2.3  SVR 1444 (TD) dynes  PVR 3.6 (TD) AMOR     Right sided filling pressures are normal. Left sided filling pressures are normal. Mild elevated pulmonary hypertension. Normal cardiac output level. Hemodynamic data has been modified in Epic per physician review.      Assessment and Plan:   Ms. Morillo is a 72 year old female with a past medical history including HFrEF 2/2 NICM, nonobstructive CAD from 3/2023 invasive coronary angiogram, and T2DM who presents for evaluation of chronic systolic heart failure.    She has a recent HF diagnosis. I do think that her medications are maximized given her soft blood pressures. She has been referred for ICD- they are going to monitor and revisit the subject in November.. She is euvolemic. Tolerating current medication, but we don't have room for more currently.    # Chronic systolic heart  failure/HFrEF secondary to NICM. EF 15-20%   Stage C. NYHA Class III.    Fluid status: euvolemic, off loop diuretics   ACEi/ARB/ARNI: continue lisinopril 5 mg BID, deferred increase d/t soft BPS. Awaiting word from the patient assistance program.  BB: ongoing titration, for now, continue metoprolol xl 25 mg daily  Aldosterone: aldactone 25 mg daily   SGLT2i- cost prohibitive  SCD prophylaxis: referred to EP for consideration of ICD by Dr. Schilling, they would like a repeated echo. Not meeting criteria for secondary prevention with her NSVT per EP.  NSAID use: contraindicated  Sleep apnea evaluation: referred to sleep clinic (outside referall)  Remote monitoring: Would consider in the future if more volume concerns, currently I don't think this is needed  Other: will order an echo with her October appointment as that will be 90 days since she achieved maximum medications.    # T2DM  - Will need to communicate with her PCP if we are able to add an SGLT2i (she is on metformin)    # Non-obstructive coronary artery disease as per 2023 angiogram  - Continue ASA and lipitor    # CKD stage 2  - Cr 1.10, stable with recent b/l      Follow up   - Dr. Schilling 10/17/23 as scheduled- with an echo  - EP with JOSIAH Colon on 11/2/23   - CORE clinic virtual clinic end of November or December    Billing  - I spent 18 minutes face to face with the patient and an additional 13 minutes reviewing results, coordinating care, and completing documentation on the day of visit      Eden Pugh PA-C  [facility]          ANA WASHINGTON

## 2023-08-23 ENCOUNTER — OFFICE VISIT (OUTPATIENT)
Dept: CARDIOLOGY | Facility: CLINIC | Age: 73
End: 2023-08-23
Attending: PHYSICIAN ASSISTANT
Payer: MEDICARE

## 2023-08-23 ENCOUNTER — LAB (OUTPATIENT)
Dept: LAB | Facility: CLINIC | Age: 73
End: 2023-08-23
Payer: MEDICARE

## 2023-08-23 VITALS
HEART RATE: 79 BPM | WEIGHT: 125.1 LBS | BODY MASS INDEX: 25.22 KG/M2 | HEIGHT: 59 IN | OXYGEN SATURATION: 96 % | DIASTOLIC BLOOD PRESSURE: 63 MMHG | SYSTOLIC BLOOD PRESSURE: 99 MMHG

## 2023-08-23 DIAGNOSIS — I50.20 HEART FAILURE WITH REDUCED EJECTION FRACTION, NYHA CLASS III (H): Primary | ICD-10-CM

## 2023-08-23 DIAGNOSIS — I50.20 HEART FAILURE WITH REDUCED EJECTION FRACTION, NYHA CLASS III (H): ICD-10-CM

## 2023-08-23 LAB
ANION GAP SERPL CALCULATED.3IONS-SCNC: 9 MMOL/L (ref 7–15)
BUN SERPL-MCNC: 28.7 MG/DL (ref 8–23)
CALCIUM SERPL-MCNC: 10 MG/DL (ref 8.8–10.2)
CHLORIDE SERPL-SCNC: 103 MMOL/L (ref 98–107)
CREAT SERPL-MCNC: 1.1 MG/DL (ref 0.51–0.95)
DEPRECATED HCO3 PLAS-SCNC: 28 MMOL/L (ref 22–29)
GFR SERPL CREATININE-BSD FRML MDRD: 52 ML/MIN/1.73M2
GLUCOSE SERPL-MCNC: 128 MG/DL (ref 70–99)
POTASSIUM SERPL-SCNC: 4.9 MMOL/L (ref 3.4–5.3)
SODIUM SERPL-SCNC: 140 MMOL/L (ref 136–145)

## 2023-08-23 PROCEDURE — G0463 HOSPITAL OUTPT CLINIC VISIT: HCPCS | Performed by: PHYSICIAN ASSISTANT

## 2023-08-23 PROCEDURE — 36415 COLL VENOUS BLD VENIPUNCTURE: CPT | Performed by: PATHOLOGY

## 2023-08-23 PROCEDURE — 80048 BASIC METABOLIC PNL TOTAL CA: CPT | Performed by: PATHOLOGY

## 2023-08-23 PROCEDURE — 99214 OFFICE O/P EST MOD 30 MIN: CPT | Performed by: PHYSICIAN ASSISTANT

## 2023-08-23 ASSESSMENT — PAIN SCALES - GENERAL: PAINLEVEL: NO PAIN (0)

## 2023-08-23 NOTE — PATIENT INSTRUCTIONS
Take your medicines every day, as directed    Changes made today:  When your Entresto program gets approved or started please CALL US FOR FURTHER INSTRUCTIONS. Do not start the Entresto without talking to us     Monitor Your Weight and Symptoms    Contact us if you:    Gain 2 pounds in one day or 5 pounds in one week  Feel more short of breath  Notice more leg swelling  Feel lightheadeded   Change your lifestyle    Limit Salt or Sodium:  2000 mg  Limit Fluids:  2000 mL or approximately 64 ounces  Eat a Heart Healthy Diet  Low in saturated fats  Stay Active:  Aim to move at least 150 minutes every  week         To Contact us    During Business Hours:  [phone], option # 1      After hours, weekends or holidays:   [phone], Option #4  Ask to speak to the On-Call Cardiologist. Inform them you are a CORE/heart failure patient at the [facility].     Use [employer] allows you to communicate directly with your heart team through secure messaging.  [employer] can be accessed any time on your phone, computer, or tablet.  If you need assistance, we'd be happy to help!         Keep your Heart Appointments:    - Dr. Schilling 10/17/23 as scheduled    - EP with JOSIAH Colon on 11/2/23     - CORE clinic virtual clinic end of November or December     Please consider attending our virtual support group which is held monthly. Please reach out to Olvin at [phone] for more information if you are interested in attending.       2023 dates:    Monday, September 11th, 1-2pm  Monday, October 2nd, 1-2pm  Monday, November 6th, 1-2pm  Monday, December 4th, 1-2pm

## 2023-08-23 NOTE — NURSING NOTE
Chief Complaint   Patient presents with    Follow Up     Return CORE; 72 year old with chronic systolic heart failure presents for follow up virtual visit         Vitals were taken, medications reconciled.    Litzy Patiño, Facilitator   12:51 PM  Vitals were taken. Medications reconciled.   Litzy Patiño - Visit Facilitator

## 2023-08-23 NOTE — LETTER
8/23/2023      RE: Kayli Morillo  [address] [facility] [address]       Dear Colleague,    Thank you for the opportunity to participate in the care of your patient, Kayli Morillo, at the [facility] [place] at [facility]. Please see a copy of my visit note below.      HPI:   Ms. Morillo is a 72 year old female with a past medical history including HFrEF 2/2 NICM, nonobstructive CAD from 3/2023 invasive coronary angiogram, and T2DM who presents for evaluation of chronic systolic heart failure.    She was seen by Dr. Schilling on 7/11/23. He felt that she was on maximum tolerated GDMT. She was referred to EP for consideration of ICD. She was switched to very low dose of entresto. She was also having symptomatic episodes of VT.     No SOB at rest.  She has been doing cardiac rehab, she is walking at 1.5 mph for 30 minutes. She felt CERON once with that but no other CERON with cardiac rehab or at home. Her orthopnea has resolved- she is back to sleeping in bed. No more PND. No LE edema. No abdominal edema. SBPs have been running mostly in the 90s-100s. Sometimes in the 80s- happens at least once per week. Hrs are 60s-70s.. No lightheadedness, dizziness, pre-syncope, or syncope. No palpitations. No chest pain. Appetite is normal and she denies early satiety or post-prandial nausea.    Weights was 121.4 at home today. The lowest it has been is 120.4.    Cardiac Medications  Aldactone 25 mg daily  Metoprolol succinate 25 mg daily  Lisinopril 5 mg BID  Lipitor 20 mg daily  ASA 81 mg daily    PAST MEDICAL HISTORY:  Past Medical History:   Diagnosis Date    Chronic systolic heart failure (H)     DM2 (diabetes mellitus, type 2) (H)     Gastroesophageal reflux disease without esophagitis     Osteopenia        FAMILY HISTORY:  Family History   Problem Relation Age of Onset    Diabetes Mother     Cerebrovascular Disease Father     Alzheimer Disease Father         SOCIAL HISTORY:  Social History     Socioeconomic History    Marital status:    Tobacco Use    Smoking status: Never     Passive exposure: Past    Smokeless tobacco: Never       CURRENT MEDICATIONS:  aspirin (ASA) 81 MG chewable tablet,   atorvastatin (LIPITOR) 10 MG tablet, Take 1 tablet (10 mg) by mouth daily  Calcium Carb-Cholecalciferol 500-10 MG-MCG TABS,   lisinopril (ZESTRIL) 5 MG tablet, Take 1 tablet (5 mg) by mouth daily  metFORMIN (GLUCOPHAGE) 500 MG tablet, 2 times daily (with meals)  metoprolol succinate ER (TOPROL XL) 25 MG 24 hr tablet, Take 1 tablet (25 mg) by mouth daily  Multiple Vitamins-Minerals (ICAPS AREDS 2 PO),   spironolactone (ALDACTONE) 25 MG tablet, Take 1 tablet (25 mg) by mouth daily    No current facility-administered medications on file prior to visit.      ROS:   Refer to HPI    EXAM:  There were no vitals taken for this visit.  N/A video visit    Labs, reviewed with patient in clinic today:  CBC RESULTS:  Lab Results   Component Value Date    WBC 7.9 05/11/2023    RBC 4.76 05/11/2023    HGB 13.9 05/11/2023    HCT 44.1 05/11/2023    MCV 93 05/11/2023    MCH 29.2 05/11/2023    MCHC 31.5 05/11/2023    RDW 13.8 05/11/2023     05/11/2023       CMP RESULTS:  Lab Results   Component Value Date     07/11/2023    POTASSIUM 4.9 07/11/2023    CHLORIDE 102 07/25/2023    CO2 28 07/11/2023    ANIONGAP 8 07/11/2023     (H) 07/11/2023     (H) 03/31/2023    BUN 26.8 (H) 07/11/2023    CR 1.02 (H) 07/11/2023    GFRESTIMATED 57 (L) 07/11/2023    JULIANNE 9.6 07/11/2023    BILITOTAL 0.4 05/11/2023    ALBUMIN 4.1 05/11/2023    ALKPHOS 83 05/11/2023    ALT 14 05/11/2023    AST 17 05/11/2023        INR RESULTS:  Lab Results   Component Value Date    INR 1.01 03/31/2023       No results found for: MAG  No results found for: NTBNPI  Lab Results   Component Value Date    NTBNP 707 07/11/2023       Diagnostics:  5/25/23 cMRI  1. The left ventricle is severely dilated. There  is increased trabeculations in the left ventricle. There  is severe global hypokinesis. The systolic function severely reduced. The LVEF is traced at 19%.      2. The right ventricle is normal in cavity size. The global systolic function is normal. The RVEF is 51%.      3. The left atrium is severely enlarged. The right atrium is normal in size.      4. There is at least moderate functional mitral regurgitation.     5. There is mild hyperenhancement in the basal septum in the pattern of mid-myocardial stripe consistent  with non ischemic fibrosis.    T1 mapping - ECV 33% (elevated).   T2 mapping - no myocardial edema present.      6. There is trivial pericardial effusion.     7. There is no intracardiac thrombus.     CONCLUSIONS:   Non-ischemic cardiomyopathy with severe LV dysfunction and normal right ventricular function, LVEF of 19%  and RVEF 51%.  No evidence of myocardial edema.      CORE EXAM    3/16/23 ECHO  Interpretation Summary  Moderate left ventricular dilation (LVIDd 6.1cm). Severe diffuse hypokinesis.  Biplane LVEF is 14.3%.  The right ventricle is normal size and function.  Moderate mitral insufficiency is present due to dilated left ventricle.  Moderate pulmonary hypertension. Right ventricular systolic pressure is 52  mmHg.  The inferior vena cava was normal in size with preserved respiratory  variability.     There is no prior study for direct comparison. Dr. Quarles informed of the  Findings.    3/31/23 Coronary angiogram and RHC  Diagnostic  Dominance: Right  Left Main   The vessel was visualized by selective angiography, is moderate in size and is angiographically normal.      Left Anterior Descending   The vessel is moderate in size.   Prox LAD lesion is 20% stenosed. The lesion is focal.   Prox LAD to Mid LAD lesion is 25% stenosed with 65% stenosed side branch in 1st Diag.      First Diagonal Branch   The vessel is moderate in size.      First Septal Branch   The vessel is small and is  angiographically normal.      Second Diagonal Branch   The vessel is small.      Second Septal Branch   The vessel is small and is angiographically normal.      Third Diagonal Branch   The vessel is small and is angiographically normal.      Left Circumflex   The vessel is moderate in size and is angiographically normal.      First Obtuse Marginal Branch   The vessel is small and is angiographically normal.      Second Obtuse Marginal Branch   The vessel is moderate in size.   2nd Mrg lesion is 40% stenosed.      Lateral Second Obtuse Marginal Branch   The vessel is moderate in size and is angiographically normal.      Third Obtuse Marginal Branch   The vessel is moderate in size and is angiographically normal.      Fourth Obtuse Marginal Branch   The vessel is small and is angiographically normal.      Right Coronary Artery   The vessel was visualized by selective angiography and is moderate in size.   Mid RCA lesion is 20% stenosed.      Acute Marginal Branch   The vessel is moderate in size and is angiographically normal.      Right Ventricular Branch   The vessel is small and is angiographically normal.      Right Posterior Descending Artery   The vessel is moderate in size and is angiographically normal.      Right Posterior Atrioventricular Artery   The vessel is small and is angiographically normal.      NIBP 117/56/78  RA --/--/3  RV 42/3  PA 42/14/24  PCWP --/--/10  PA Sat 62%  West CO/CI 3.5/2.2  TD CO/CI 3.6/2.3  SVR 1444 (TD) dynes  PVR 3.6 (TD) AMOR     Right sided filling pressures are normal. Left sided filling pressures are normal. Mild elevated pulmonary hypertension. Normal cardiac output level. Hemodynamic data has been modified in Epic per physician review.      Assessment and Plan:   Ms. Morillo is a 72 year old female with a past medical history including HFrEF 2/2 NICM, nonobstructive CAD from 3/2023 invasive coronary angiogram, and T2DM who presents for evaluation of chronic systolic heart  failure.    She has a recent HF diagnosis. I do think that her medications are maximized given her soft blood pressures. She has been referred for ICD- they are going to monitor and revisit the subject in November.. She is euvolemic. Tolerating current medication, but we don't have room for more currently.    # Chronic systolic heart failure/HFrEF secondary to NICM. EF 15-20%   Stage C. NYHA Class III.    Fluid status: euvolemic, off loop diuretics   ACEi/ARB/ARNI: continue lisinopril 5 mg BID, deferred increase d/t soft BPS. Awaiting word from the patient assistance program.  BB: ongoing titration, for now, continue metoprolol xl 25 mg daily  Aldosterone: aldactone 25 mg daily   SGLT2i- cost prohibitive  SCD prophylaxis: referred to EP for consideration of ICD by Dr. Schilling, they would like a repeated echo. Not meeting criteria for secondary prevention with her NSVT per EP.  NSAID use: contraindicated  Sleep apnea evaluation: referred to sleep clinic (outside referall)  Remote monitoring: Would consider in the future if more volume concerns, currently I don't think this is needed  Other: will order an echo with her October appointment as that will be 90 days since she achieved maximum medications.    # T2DM  - Will need to communicate with her PCP if we are able to add an SGLT2i (she is on metformin)    # Non-obstructive coronary artery disease as per 2023 angiogram  - Continue ASA and lipitor    # CKD stage 2  - Cr 1.10, stable with recent b/l      Follow up   - Dr. Schilling 10/17/23 as scheduled- with an echo  - EP with JOSIAH Colon on 11/2/23   - CORE clinic virtual clinic end of November or December    Billing  - I spent 18 minutes face to face with the patient and an additional 13 minutes reviewing results, coordinating care, and completing documentation on the day of visit      Eden Pugh PA-C  [facility]iology          CC  JOSHUA, ANA PERKINS

## 2023-08-25 ENCOUNTER — MYC MEDICAL ADVICE (OUTPATIENT)
Dept: CARDIOLOGY | Facility: CLINIC | Age: 73
End: 2023-08-25
Payer: MEDICARE

## 2023-08-25 DIAGNOSIS — R06.02 SOB (SHORTNESS OF BREATH): ICD-10-CM

## 2023-08-25 DIAGNOSIS — I50.20 HEART FAILURE WITH REDUCED EJECTION FRACTION, NYHA CLASS III (H): ICD-10-CM

## 2023-08-29 RX ORDER — ATORVASTATIN CALCIUM 20 MG/1
20 TABLET, FILM COATED ORAL DAILY
Qty: 90 TABLET | Refills: 3 | Status: SHIPPED | OUTPATIENT
Start: 2023-08-29

## 2023-08-29 RX ORDER — LISINOPRIL 5 MG/1
5 TABLET ORAL 2 TIMES DAILY
Qty: 180 TABLET | Refills: 3 | Status: SHIPPED | OUTPATIENT
Start: 2023-08-29 | End: 2023-10-09

## 2023-09-06 ENCOUNTER — MYC MEDICAL ADVICE (OUTPATIENT)
Dept: CARDIOLOGY | Facility: CLINIC | Age: 73
End: 2023-09-06
Payer: MEDICARE

## 2023-09-06 DIAGNOSIS — R06.02 SOB (SHORTNESS OF BREATH): ICD-10-CM

## 2023-09-06 DIAGNOSIS — I50.20 HEART FAILURE WITH REDUCED EJECTION FRACTION, NYHA CLASS III (H): Primary | ICD-10-CM

## 2023-09-07 RX ORDER — SACUBITRIL AND VALSARTAN 24; 26 MG/1; MG/1
0.5 TABLET, FILM COATED ORAL 2 TIMES DAILY
Qty: 90 TABLET | Refills: 3 | Status: SHIPPED | OUTPATIENT
Start: 2023-09-07 | End: 2023-10-09

## 2023-09-07 RX ORDER — SACUBITRIL AND VALSARTAN 24; 26 MG/1; MG/1
0.5 TABLET, FILM COATED ORAL 2 TIMES DAILY
Qty: 90 TABLET | Refills: 3 | Status: SHIPPED | OUTPATIENT
Start: 2023-09-07 | End: 2023-09-07

## 2023-09-29 ENCOUNTER — MYC MEDICAL ADVICE (OUTPATIENT)
Dept: CARDIOLOGY | Facility: CLINIC | Age: 73
End: 2023-09-29
Payer: MEDICARE

## 2023-09-29 DIAGNOSIS — R06.02 SOB (SHORTNESS OF BREATH): ICD-10-CM

## 2023-09-29 DIAGNOSIS — I50.20 HEART FAILURE WITH REDUCED EJECTION FRACTION, NYHA CLASS III (H): Primary | ICD-10-CM

## 2023-10-09 RX ORDER — SACUBITRIL AND VALSARTAN 24; 26 MG/1; MG/1
1 TABLET, FILM COATED ORAL 2 TIMES DAILY
Qty: 90 TABLET | Refills: 3 | Status: SHIPPED | OUTPATIENT
Start: 2023-10-09 | End: 2023-10-17

## 2023-10-09 NOTE — TELEPHONE ENCOUNTER
Labs received from outside facility, copied here and sent to scanning to be put into chart. Message sent to Kayli to check on blood pressures since starting Entresto.

## 2023-10-09 NOTE — TELEPHONE ENCOUNTER
Reviewed with Justina RAHMAN.   Date: 10/9/2023    Time of Call: 5:17 PM     Diagnosis:  HF     [ TORB ] Ordering provider: Justina rahman  Order: Increase Entresto to 24/26 mg BID. BMP in a week with Dr Schilling     Order received by: Usha Martino RN      Follow-up/additional notes: will clarify if additional labs needed after that

## 2023-10-13 ENCOUNTER — TELEPHONE (OUTPATIENT)
Dept: CARDIOLOGY | Facility: CLINIC | Age: 73
End: 2023-10-13
Payer: MEDICARE

## 2023-10-13 DIAGNOSIS — I50.20 HEART FAILURE WITH REDUCED EJECTION FRACTION, NYHA CLASS III (H): ICD-10-CM

## 2023-10-13 RX ORDER — SPIRONOLACTONE 25 MG/1
25 TABLET ORAL DAILY
Qty: 180 TABLET | Refills: 3 | Status: SHIPPED | OUTPATIENT
Start: 2023-10-13

## 2023-10-13 NOTE — TELEPHONE ENCOUNTER
[facility] Call Center    Phone Message    May a detailed message be left on voicemail: yes     Reason for Call: Medication Question or concern regarding medication   Prescription Clarification  Name of Medication: sacubitril-valsartan (ENTRESTO) 24-26 MG per tablet   Prescribing Provider: Eden Pugh    Pharmacy:    [facility] [place] - [address] [facility]     What on the order needs clarification? Pharmacy requesting the quantity be changed to 180 for a 90 day supply per insurance.       Action Taken: Other: Cardiology    Travel Screening: Not Applicable    Thank you!  [facility]

## 2023-10-16 ENCOUNTER — LAB (OUTPATIENT)
Dept: LAB | Facility: CLINIC | Age: 73
End: 2023-10-16
Payer: MEDICARE

## 2023-10-16 DIAGNOSIS — I49.3 PVC'S (PREMATURE VENTRICULAR CONTRACTIONS): ICD-10-CM

## 2023-10-16 DIAGNOSIS — I10 BENIGN ESSENTIAL HYPERTENSION: ICD-10-CM

## 2023-10-16 DIAGNOSIS — E78.2 MIXED HYPERLIPIDEMIA: ICD-10-CM

## 2023-10-16 DIAGNOSIS — I47.29 PAROXYSMAL VENTRICULAR TACHYCARDIA (H): ICD-10-CM

## 2023-10-16 DIAGNOSIS — I42.8 NONISCHEMIC CARDIOMYOPATHY (H): ICD-10-CM

## 2023-10-16 DIAGNOSIS — I50.20 HEART FAILURE WITH REDUCED EJECTION FRACTION, NYHA CLASS III (H): ICD-10-CM

## 2023-10-16 LAB
ALBUMIN SERPL BCG-MCNC: 4.2 G/DL (ref 3.5–5.2)
ALP SERPL-CCNC: 109 U/L (ref 35–104)
ALT SERPL W P-5'-P-CCNC: 9 U/L (ref 0–50)
ANION GAP SERPL CALCULATED.3IONS-SCNC: 12 MMOL/L (ref 7–15)
AST SERPL W P-5'-P-CCNC: 17 U/L (ref 0–45)
BILIRUB SERPL-MCNC: 0.2 MG/DL
BUN SERPL-MCNC: 18.4 MG/DL (ref 8–23)
CALCIUM SERPL-MCNC: 10 MG/DL (ref 8.8–10.2)
CHLORIDE SERPL-SCNC: 103 MMOL/L (ref 98–107)
CREAT SERPL-MCNC: 1.01 MG/DL (ref 0.51–0.95)
DEPRECATED HCO3 PLAS-SCNC: 26 MMOL/L (ref 22–29)
EGFRCR SERPLBLD CKD-EPI 2021: 58 ML/MIN/1.73M2
ERYTHROCYTE [DISTWIDTH] IN BLOOD BY AUTOMATED COUNT: 12.5 % (ref 10–15)
GLUCOSE SERPL-MCNC: 160 MG/DL (ref 70–99)
HCT VFR BLD AUTO: 41 % (ref 35–47)
HGB BLD-MCNC: 13.6 G/DL (ref 11.7–15.7)
MCH RBC QN AUTO: 31.6 PG (ref 26.5–33)
MCHC RBC AUTO-ENTMCNC: 33.2 G/DL (ref 31.5–36.5)
MCV RBC AUTO: 95 FL (ref 78–100)
NT-PROBNP SERPL-MCNC: 511 PG/ML (ref 0–900)
PLATELET # BLD AUTO: 273 10E3/UL (ref 150–450)
POTASSIUM SERPL-SCNC: 4.7 MMOL/L (ref 3.4–5.3)
PROT SERPL-MCNC: 7.2 G/DL (ref 6.4–8.3)
RBC # BLD AUTO: 4.31 10E6/UL (ref 3.8–5.2)
SODIUM SERPL-SCNC: 141 MMOL/L (ref 135–145)
WBC # BLD AUTO: 8.5 10E3/UL (ref 4–11)

## 2023-10-16 PROCEDURE — 85027 COMPLETE CBC AUTOMATED: CPT | Performed by: PATHOLOGY

## 2023-10-16 PROCEDURE — 83880 ASSAY OF NATRIURETIC PEPTIDE: CPT | Performed by: PATHOLOGY

## 2023-10-16 PROCEDURE — 36415 COLL VENOUS BLD VENIPUNCTURE: CPT | Performed by: PATHOLOGY

## 2023-10-16 PROCEDURE — 80053 COMPREHEN METABOLIC PANEL: CPT | Performed by: PATHOLOGY

## 2023-10-17 ENCOUNTER — OFFICE VISIT (OUTPATIENT)
Dept: CARDIOLOGY | Facility: CLINIC | Age: 73
End: 2023-10-17
Attending: INTERNAL MEDICINE
Payer: MEDICARE

## 2023-10-17 ENCOUNTER — ANCILLARY PROCEDURE (OUTPATIENT)
Dept: CARDIOLOGY | Facility: CLINIC | Age: 73
End: 2023-10-17
Payer: MEDICARE

## 2023-10-17 VITALS
HEART RATE: 77 BPM | WEIGHT: 123.1 LBS | OXYGEN SATURATION: 97 % | BODY MASS INDEX: 24.82 KG/M2 | SYSTOLIC BLOOD PRESSURE: 136 MMHG | DIASTOLIC BLOOD PRESSURE: 70 MMHG

## 2023-10-17 DIAGNOSIS — I50.20 HEART FAILURE WITH REDUCED EJECTION FRACTION, NYHA CLASS III (H): ICD-10-CM

## 2023-10-17 DIAGNOSIS — I49.3 PVC'S (PREMATURE VENTRICULAR CONTRACTIONS): ICD-10-CM

## 2023-10-17 DIAGNOSIS — I42.8 NONISCHEMIC CARDIOMYOPATHY (H): ICD-10-CM

## 2023-10-17 DIAGNOSIS — R06.02 SOB (SHORTNESS OF BREATH): ICD-10-CM

## 2023-10-17 DIAGNOSIS — E78.2 MIXED HYPERLIPIDEMIA: ICD-10-CM

## 2023-10-17 DIAGNOSIS — I47.29 PAROXYSMAL VENTRICULAR TACHYCARDIA (H): ICD-10-CM

## 2023-10-17 DIAGNOSIS — I10 BENIGN ESSENTIAL HYPERTENSION: ICD-10-CM

## 2023-10-17 LAB — LVEF ECHO: NORMAL

## 2023-10-17 PROCEDURE — G0463 HOSPITAL OUTPT CLINIC VISIT: HCPCS | Performed by: INTERNAL MEDICINE

## 2023-10-17 PROCEDURE — 93306 TTE W/DOPPLER COMPLETE: CPT | Performed by: INTERNAL MEDICINE

## 2023-10-17 PROCEDURE — 99215 OFFICE O/P EST HI 40 MIN: CPT | Mod: 25 | Performed by: INTERNAL MEDICINE

## 2023-10-17 PROCEDURE — 99207 PR STATISTIC IV PUSH SINGLE INITIAL SUBSTANCE: CPT | Performed by: INTERNAL MEDICINE

## 2023-10-17 RX ORDER — SACUBITRIL AND VALSARTAN 24; 26 MG/1; MG/1
1.5 TABLET, FILM COATED ORAL 2 TIMES DAILY
Qty: 270 TABLET | Refills: 3 | Status: SHIPPED | OUTPATIENT
Start: 2023-10-17 | End: 2023-11-09

## 2023-10-17 RX ADMIN — Medication 6 ML: at 07:25

## 2023-10-17 ASSESSMENT — PAIN SCALES - GENERAL: PAINLEVEL: NO PAIN (0)

## 2023-10-17 NOTE — NURSING NOTE
Chief Complaint   Patient presents with    Follow Up     10/17/23--Dr. Schilling: Chronic systolic heart failure.     Vitals were taken and medications reconciled.    Scott Kan, EMT  7:47 AM

## 2023-10-17 NOTE — LETTER
"10/17/2023      RE: Kayli Morillo  [address] [facility] [address]       Dear Colleague,    Thank you for the opportunity to participate in the care of your patient, Kayli Morillo, at the [facility] [place] at [facility]. Please see a copy of my visit note below.    October 17, 2023     Kayli Morillo  is a 73 year old year old female with a past medical history significant for HFrEF 2/2 NICM, nonobstructive CAD from 3/2023 invasive coronary angiogram, and T2DM who presents for evaluation of chronic systolic heart failure. Kayli initially presented to an OSH earlier in 2023. She was dyspneic, had a \"raspiness\" in her throat and mild lower extremity edema. A brief w/u revealed a pleural effusion and decreased cardiac function by bedside ultrasound. She was then seen in cardiology clinic with an echocardiogram. This echo revealed decreased LVEF of 14%. A subsequent coronary angiogram revealed nonobstructive CAD, and a right heart catheterization revealed normal filling pressures and borderline normal cardiac output/index. Given the lack of ischemic etiology, a cardiac MRI was ordered. After I saw her she has also been followed by [facility].  Other than some recent stomach flu she has been doing well with medications.  Temporarily spironolactone was held and lisinopril was reduced to once a day dosing however was able to increase it back up to 5 mg twice daily dosing and now 5 mg in the morning and 7-1/2 mg in the afternoon.  She did have some VT episodes on Zio and were associated with harper symptoms. Referred her to EP for ICD considerations; medical therapy for 3 months was appropriately suggested and follow up afterwards for potential implant if LVEF remains low. NSVT burden was not high enough to warrant ICD. Today she is here for follow-up.   He has been doing pretty well at home without any new complaints.  She takes her medications as " prescribed and in fact she has been able to increase the Entresto due to 24 mg twice daily dosing at this time.  She reported symptoms of dyspnea that has been relieved with the initiation reduced with insurance approval issues.  She is taking her blood pressure at home on a regular basis.  Systolic blood pressure readings are consistently above 105 mmHg.  No other complaints she is able to walk as she wishes.  She just finished cardiac rehabilitation program.     PAST MEDICAL HISTORY:  Past Medical History:   Diagnosis Date     Chronic systolic heart failure (H)      DM2 (diabetes mellitus, type 2) (H)      Gastroesophageal reflux disease without esophagitis      Osteopenia      FAMILY HISTORY:  Family History   Problem Relation Age of Onset     Diabetes Mother      Cerebrovascular Disease Father      Alzheimer Disease Father      SOCIAL HISTORY:  Social History     Socioeconomic History     Marital status:    Tobacco Use     Smoking status: Never     Passive exposure: Past     Smokeless tobacco: Never     CURRENT MEDICATIONS:  Current Outpatient Medications   Medication     aspirin (ASA) 81 MG chewable tablet     atorvastatin (LIPITOR) 20 MG tablet     Calcium Carb-Cholecalciferol 500-10 MG-MCG TABS     metFORMIN (GLUCOPHAGE) 500 MG tablet     metoprolol succinate ER (TOPROL XL) 25 MG 24 hr tablet     Multiple Vitamins-Minerals (ICAPS AREDS 2 PO)     sacubitril-valsartan (ENTRESTO) 24-26 MG per tablet     spironolactone (ALDACTONE) 25 MG tablet     No current facility-administered medications for this visit.     ROS:   Constitutional: No fever, chills, or sweats. Weight is 0 lbs 0 oz  ENT: No visual disturbance, ear ache, epistaxis, sore throat.   Allergies/Immunologic: Negative.   Respiratory: No cough, hemoptysis.   Cardiovascular: As per HPI.   GI: No nausea, vomiting, hematemesis, melena, or hematochezia.   : No urinary frequency, dysuria, or hematuria.   Integument: Negative.   Psychiatric: Pleasant,  no major depression noted  Neuro: No focal neurological deficits noted  Endocrinology: Negative.   Musculoskeletal: As per HPI.      EXAM:  /70 (BP Location: Right arm, Patient Position: Chair, Cuff Size: Adult Regular)   Pulse 77   Wt 55.8 kg (123 lb 1.6 oz)   SpO2 97%   BMI 24.82 kg/m    General: appears comfortable, alert and oriented  Head: normocephalic, atraumatic  Eyes: anicteric sclera, EOMI , PERRL  Neck: no adenopathy  Orophyarynx: moist mucosa, no lesions noted  Heart: S1/S2 heard, no murmurs, rubs or gallop. Estimated JVP at 5 cmH2O  Lungs: normal breath sounds, no wheezing  Abdomen: soft, non-tender, bowel sounds present, no hepatosplenomegaly  Extremities: No LE edema today  Skin: no open lesions noted  Neuro: grossly non-focal     Labs:  Lab Results   Component Value Date    WBC 8.5 10/16/2023    HGB 13.6 10/16/2023    HCT 41.0 10/16/2023     10/16/2023     10/16/2023    POTASSIUM 4.7 10/16/2023    CHLORIDE 103 10/16/2023    CO2 26 10/16/2023    BUN 18.4 10/16/2023    CR 1.01 (H) 10/16/2023     (H) 10/16/2023    NTBNP 511 10/16/2023    AST 17 10/16/2023    ALT 9 10/16/2023    ALKPHOS 109 (H) 10/16/2023    BILITOTAL 0.2 10/16/2023    INR 1.01 03/31/2023     Echocardiogram 3/16/23  Moderate left ventricular dilation (LVIDd 6.1cm). Severe diffuse hypokinesis.  Biplane LVEF is 14.3%.  The right ventricle is normal size and function.  Moderate mitral insufficiency is present due to dilated left ventricle.  Moderate pulmonary hypertension. Right ventricular systolic pressure is 52mmHg.  The inferior vena cava was normal in size with preserved respiratory variability.     Zio 4/2023:  79 runs of VT up to 19 beats were noted in addition a few episodes of SVT.     CMRI 5/2023:  1. The left ventricle is severely dilated. There is increased trabeculations in the left ventricle. There is severe global hypokinesis. The systolic function severely reduced. The LVEF is traced at 19%.    2. The right ventricle is normal in cavity size. The global systolic function is normal. The RVEF is 51%.   3. The left atrium is severely enlarged. The right atrium is normal in size.   4. There is at least moderate functional mitral regurgitation.  5. There is mild hyperenhancement in the basal septum in the pattern of mid-myocardial stripe consistent with non ischemic fibrosis.    T1 mapping - ECV 33% (elevated).   T2 mapping - no myocardial edema present.   6. There is trivial pericardial effusion.  7. There is no intracardiac thrombus.  CONCLUSIONS:   Non-ischemic cardiomyopathy with severe LV dysfunction and normal right ventricular function, LVEF of 19% and RVEF 51%. No evidence of myocardial edema.      TTE 10/17/23:  Mild left ventricular dilation is present. Left ventricular function is decreased. The ejection fraction is 30-35% (moderately reduced). Abnormal septal motion consistent with left bundle branch block is present. Moderate diffuse hypokinesis is present.  Global right ventricular function is normal. Mild to moderate mitral insufficiency is present. Pulmonary artery systolic pressure is normal. Estimated mean right atrial pressure is normal.   No pericardial effusion is present.    ASSESSMENT AND PLAN:  72-year-old lady with above past medical history including nonischemic cardiomyopathy severely reduced ejection fraction 19% LVEF normal at left ventricular function with episodes of symptomatic VT who is here for follow-up.  Overall doing very well without any issues.  Able to increase the medications and tolerates the increased dose of Entresto.  We are going to continue medication titration and increase Entresto to 37 and half milligram twice daily dosing and if tolerates this then in a week or 10 days we will increase it further to 49 mg twice daily dosing.  Echo done today and reviewed.  Ejection fraction is around 30 to 35% which is significant improvement from prior.  I think there is a  chance that she might get even better in the surgery to continue medication optimization.  No other medication changes today.  She already has a follow-up with EP discussions about ICD or CRT-D at the time.    Chronic systolic heart failure/HFrEF (LV EF 14%) secondary to nonischemic cardiomyopathy NYHA Symptom Class III Stage C, it was 19% on MRI however overall I think this is unchanged.    I will see her back in 3 months or sooner as needed with TTE     I appreciate the opportunity to participate in the care of Kayli Morillo . Please do not hesitate to contact me with any further questions.    I have spent 45 minutes with chart review echo review as well as discussing management plan with patient.    Albaro Schilling MD  [facility]  Cardiovascular Division

## 2023-10-17 NOTE — PATIENT INSTRUCTIONS
Dr. Schilling recommends:    Increase Entresto to 24-26 MG tablets, take 1 and 1/2 tablet twice daily.  If after a week you feel well and your blood pressures are above 100 systolic, please increase to 2 tablets twice daily and notify us so we can send in a prescription for your new increased dose.    Follow up clinic visit with Dr. Schilling in 3 months with an echocardiogram and labs the same day.    Thank you for your visit today.  Please call me with any questions or concerns.   Bhavesh Herrera RN  Cardiology Care Coordinator  [phone]   Fax: [phone]

## 2023-10-17 NOTE — PROGRESS NOTES
"October 17, 2023     Kayli Morillo  is a 73 year old year old female with a past medical history significant for HFrEF 2/2 NICM, nonobstructive CAD from 3/2023 invasive coronary angiogram, and T2DM who presents for evaluation of chronic systolic heart failure. Kayli initially presented to an OSH earlier in 2023. She was dyspneic, had a \"raspiness\" in her throat and mild lower extremity edema. A brief w/u revealed a pleural effusion and decreased cardiac function by bedside ultrasound. She was then seen in cardiology clinic with an echocardiogram. This echo revealed decreased LVEF of 14%. A subsequent coronary angiogram revealed nonobstructive CAD, and a right heart catheterization revealed normal filling pressures and borderline normal cardiac output/index. Given the lack of ischemic etiology, a cardiac MRI was ordered. After I saw her she has also been followed by [facility].  Other than some recent stomach flu she has been doing well with medications.  Temporarily spironolactone was held and lisinopril was reduced to once a day dosing however was able to increase it back up to 5 mg twice daily dosing and now 5 mg in the morning and 7-1/2 mg in the afternoon.  She did have some VT episodes on Zio and were associated with harper symptoms. Referred her to EP for ICD considerations; medical therapy for 3 months was appropriately suggested and follow up afterwards for potential implant if LVEF remains low. NSVT burden was not high enough to warrant ICD. Today she is here for follow-up.   He has been doing pretty well at home without any new complaints.  She takes her medications as prescribed and in fact she has been able to increase the Entresto due to 24 mg twice daily dosing at this time.  She reported symptoms of dyspnea that has been relieved with the initiation reduced with insurance approval issues.  She is taking her blood pressure at home on a regular basis.  Systolic blood pressure readings are consistently " above 105 mmHg.  No other complaints she is able to walk as she wishes.  She just finished cardiac rehabilitation program.     PAST MEDICAL HISTORY:  Past Medical History:   Diagnosis Date    Chronic systolic heart failure (H)     DM2 (diabetes mellitus, type 2) (H)     Gastroesophageal reflux disease without esophagitis     Osteopenia      FAMILY HISTORY:  Family History   Problem Relation Age of Onset    Diabetes Mother     Cerebrovascular Disease Father     Alzheimer Disease Father      SOCIAL HISTORY:  Social History     Socioeconomic History    Marital status:    Tobacco Use    Smoking status: Never     Passive exposure: Past    Smokeless tobacco: Never     CURRENT MEDICATIONS:  Current Outpatient Medications   Medication    aspirin (ASA) 81 MG chewable tablet    atorvastatin (LIPITOR) 20 MG tablet    Calcium Carb-Cholecalciferol 500-10 MG-MCG TABS    metFORMIN (GLUCOPHAGE) 500 MG tablet    metoprolol succinate ER (TOPROL XL) 25 MG 24 hr tablet    Multiple Vitamins-Minerals (ICAPS AREDS 2 PO)    sacubitril-valsartan (ENTRESTO) 24-26 MG per tablet    spironolactone (ALDACTONE) 25 MG tablet     No current facility-administered medications for this visit.     ROS:   Constitutional: No fever, chills, or sweats. Weight is 0 lbs 0 oz  ENT: No visual disturbance, ear ache, epistaxis, sore throat.   Allergies/Immunologic: Negative.   Respiratory: No cough, hemoptysis.   Cardiovascular: As per HPI.   GI: No nausea, vomiting, hematemesis, melena, or hematochezia.   : No urinary frequency, dysuria, or hematuria.   Integument: Negative.   Psychiatric: Pleasant, no major depression noted  Neuro: No focal neurological deficits noted  Endocrinology: Negative.   Musculoskeletal: As per HPI.      EXAM:  /70 (BP Location: Right arm, Patient Position: Chair, Cuff Size: Adult Regular)   Pulse 77   Wt 55.8 kg (123 lb 1.6 oz)   SpO2 97%   BMI 24.82 kg/m    General: appears comfortable, alert and oriented  Head:  normocephalic, atraumatic  Eyes: anicteric sclera, EOMI , PERRL  Neck: no adenopathy  Orophyarynx: moist mucosa, no lesions noted  Heart: S1/S2 heard, no murmurs, rubs or gallop. Estimated JVP at 5 cmH2O  Lungs: normal breath sounds, no wheezing  Abdomen: soft, non-tender, bowel sounds present, no hepatosplenomegaly  Extremities: No LE edema today  Skin: no open lesions noted  Neuro: grossly non-focal     Labs:  Lab Results   Component Value Date    WBC 8.5 10/16/2023    HGB 13.6 10/16/2023    HCT 41.0 10/16/2023     10/16/2023     10/16/2023    POTASSIUM 4.7 10/16/2023    CHLORIDE 103 10/16/2023    CO2 26 10/16/2023    BUN 18.4 10/16/2023    CR 1.01 (H) 10/16/2023     (H) 10/16/2023    NTBNP 511 10/16/2023    AST 17 10/16/2023    ALT 9 10/16/2023    ALKPHOS 109 (H) 10/16/2023    BILITOTAL 0.2 10/16/2023    INR 1.01 03/31/2023     Echocardiogram 3/16/23  Moderate left ventricular dilation (LVIDd 6.1cm). Severe diffuse hypokinesis.  Biplane LVEF is 14.3%.  The right ventricle is normal size and function.  Moderate mitral insufficiency is present due to dilated left ventricle.  Moderate pulmonary hypertension. Right ventricular systolic pressure is 52mmHg.  The inferior vena cava was normal in size with preserved respiratory variability.     Zio 4/2023:  79 runs of VT up to 19 beats were noted in addition a few episodes of SVT.     CMRI 5/2023:  1. The left ventricle is severely dilated. There is increased trabeculations in the left ventricle. There is severe global hypokinesis. The systolic function severely reduced. The LVEF is traced at 19%.   2. The right ventricle is normal in cavity size. The global systolic function is normal. The RVEF is 51%.   3. The left atrium is severely enlarged. The right atrium is normal in size.   4. There is at least moderate functional mitral regurgitation.  5. There is mild hyperenhancement in the basal septum in the pattern of mid-myocardial stripe consistent  with non ischemic fibrosis.    T1 mapping - ECV 33% (elevated).   T2 mapping - no myocardial edema present.   6. There is trivial pericardial effusion.  7. There is no intracardiac thrombus.  CONCLUSIONS:   Non-ischemic cardiomyopathy with severe LV dysfunction and normal right ventricular function, LVEF of 19% and RVEF 51%. No evidence of myocardial edema.      TTE 10/17/23:  Mild left ventricular dilation is present. Left ventricular function is decreased. The ejection fraction is 30-35% (moderately reduced). Abnormal septal motion consistent with left bundle branch block is present. Moderate diffuse hypokinesis is present.  Global right ventricular function is normal. Mild to moderate mitral insufficiency is present. Pulmonary artery systolic pressure is normal. Estimated mean right atrial pressure is normal.   No pericardial effusion is present.    ASSESSMENT AND PLAN:  72-year-old lady with above past medical history including nonischemic cardiomyopathy severely reduced ejection fraction 19% LVEF normal at left ventricular function with episodes of symptomatic VT who is here for follow-up.  Overall doing very well without any issues.  Able to increase the medications and tolerates the increased dose of Entresto.  We are going to continue medication titration and increase Entresto to 37 and half milligram twice daily dosing and if tolerates this then in a week or 10 days we will increase it further to 49 mg twice daily dosing.  Echo done today and reviewed.  Ejection fraction is around 30 to 35% which is significant improvement from prior.  I think there is a chance that she might get even better in the surgery to continue medication optimization.  No other medication changes today.  She already has a follow-up with EP discussions about ICD or CRT-D at the time.    Chronic systolic heart failure/HFrEF (LV EF 14%) secondary to nonischemic cardiomyopathy NYHA Symptom Class III Stage C, it was 19% on MRI however  overall I think this is unchanged.    I will see her back in 3 months or sooner as needed with TTE     I appreciate the opportunity to participate in the care of Kayli Morillo . Please do not hesitate to contact me with any further questions.    I have spent 45 minutes with chart review echo review as well as discussing management plan with patient.    Albaro Schilling MD  [facility]  Cardiovascular Division

## 2023-10-18 ENCOUNTER — TELEPHONE (OUTPATIENT)
Dept: CARDIOLOGY | Facility: CLINIC | Age: 73
End: 2023-10-18
Payer: MEDICARE

## 2023-10-18 NOTE — TELEPHONE ENCOUNTER
[facility] Call Center    Phone Message    May a detailed message be left on voicemail: no     Reason for Call: Other: Pt requested Bhavesh call back from Dr. Schilling's team to provide a fax number specifically for him (declined clinic fax number).  Pt stated she needs to forward a form to him to send with Rx.       Action Taken: Message routed to:  [facility] ([facility] cardio    Travel Screening: Not Applicable                                                        Called and talked to patient and gave her our fax number: [phone]. Patient is to put attention Dr. Schilling on cover sheet.

## 2023-10-25 ENCOUNTER — TELEPHONE (OUTPATIENT)
Dept: CARDIOLOGY | Facility: CLINIC | Age: 73
End: 2023-10-25
Payer: MEDICARE

## 2023-10-25 NOTE — TELEPHONE ENCOUNTER
[facility] Call [facility]    Phone Message    May a detailed message be left on voicemail: yes     Reason for Call: Other: Pt would like a call back from [place] to discuss medication.     Action Taken: Other: Cardiology    Travel Screening: Not Applicable    Thank you!  [facility]                                              Called and spoke with patient. She had recently had her Entresto increased and was to update us on her BP's. She is mostly around 100 systolic and occasionally below 100 systolic. She will stay at her current dose of Entresto, 24-26 MG tablets, take 1 and 1/2 tablet twice daily.

## 2023-10-31 NOTE — PROGRESS NOTES
ELECTROPHYSIOLOGY CLINIC VISIT    Assessment/Recommendations   Assessment/Plan:    Kayli Morillo is a 72 year old female with past medical history significant for HFrEF 2/2 NICM, nonobstructive CAD from 3/2023 invasive coronary angiogram, and type II diabetes.       HFrEF 2/2 NICM, NYHA Symptom Class III Stage C  NSVT   ACE-I/ARB/ARNi: Continue entresto 12-13 mg twice daily   BB: Continue Toprol XL 25 mg daily. Had fatigue on increased dose of 37.5 mg daily   Aldosterone antagonist: Continue spironolactone 25 mg daily    SCD prophylaxis: When she was last seen by EP, she had been on GDMT ~2 weeks, initial LVEF ~14%. Repeat echo done 10/17 with significant improvement in LVEF to 30-35%. She has continued to tolerate increasing doses of GDMT. Entresto dose increased 10/17 at recent visit with Dr Schilling. Will continue to hold on ICD while patient ongoes titration to maximal tolerated GDMT. Repeat echo ordered in 3 months. Will reassess ICD implant at this time.   %BiV pacing: N/A  Fluid status: euvolemic    Follow up in 3-4 months after echo.      History of Present Illness/Subjective    MsJames Morillo is a 73 year old female who comes in today for EP follow-up of NICM, NSVT.    She was initially seen by Dr Schilling on 4/24/23. Prior to his she had been admitted to OSH in March with dyspnea and mild peripheral edema. Work up during this admission demonstrated pleural effusion and decreased LVEF. Echo on 3/16/23 revealed decreased LVEF of 14%. A subsequent coronary angiogram revealed nonobstructive CAD. A right heart cath was then done on 3/16 this demonstrated normal filling pressures and borderline normal cardiac output. She then had cardiac MRI done fur further evaluation of decreased LVEF on 5/25 which demonstrated NICM with severe LV dysfunction and normal RV function, LVEF of 19% and RVEF 51%. When she has last seen by the heart failure team, her symptoms had been improving. Dr Schilling had ordered a  jimbo for further evaluation of arrhythmias that could be contributing to her decreased systolic function. That ziopatch demonstrated sinus rhythm with occasional NSVT runs.      EP Visit 8/1/23: She reports feeling well. She denies chest discomfort, palpitations, abdominal fullness/bloating or peripheral edema, shortness of breath, paroxysmal nocturnal dyspnea, orthopnea, lightheadedness, dizziness, pre-syncope, or syncope. Presenting 12 lead ECG shows sinus with PVCs Vent Rate 68 bpm,  ms,  ms, QTc 440 ms. Current cardiac medications include: ASA, lipitor, toprol XL, entresto and aldactone.     She presents today for follow up. She was seen in follow up by Dr Schilling on 10/17. Prior to visit she had repeat echo done with LVEF 30-35%. She has tolerated GDMT well, at last visit, Dr Schilling increased entresto dose further to 49 mg twice daily. Repeat echo ordered in 3 months. She reports feeling well, she has been able to do chores around the house. Notes considerable improvement in dyspnea since starting GDMT. Blood pressures stable, -110. She denies chest discomfort, peripheral edema, shortness of breath, orthopnea, lightheadedness or dizziness. Current cardiac medications include: lipitor, ASA, entreso, aldactone and metoprolol succinate.     I have reviewed and updated the patient's Past Medical History, Social History, Family History and Medication List.     Cardiographics (Personally Reviewed) :   Echo: 10/17/2023   Interpretation Summary  Mild left ventricular dilation is present. Left ventricular function is  decreased. The ejection fraction is 30-35% (moderately reduced). Abnormal  septal motion consistent with left bundle branch block is present. Moderate  diffuse hypokinesis is present.  Global right ventricular function is normal.  Mild to moderate mitral insufficiency is present.  Pulmonary artery systolic pressure is normal.  Estimated mean right atrial pressure is normal.  No  pericardial effusion is present.    Echo: 3/16/23  Interpretation Summary  Moderate left ventricular dilation (LVIDd 6.1cm). Severe diffuse hypokinesis.  Biplane LVEF is 14.3%.  The right ventricle is normal size and function.  Moderate mitral insufficiency is present due to dilated left ventricle.  Moderate pulmonary hypertension. Right ventricular systolic pressure is 52  mmHg.  The inferior vena cava was normal in size with preserved respiratory  variability.  There is no prior study for direct comparison. Dr. Quarles informed of the  findings.     Ziopatch: 4/25/23-5/9/23  Sinus 55 -168 bpm. 79 NSVT runs, avg 127 bpm. 2.6% PVC burden.      Coronary Angiogram/RHC 3/31/23  Right sided filling pressures are normal.  Left sided filling pressures are normal.  Mild elevated pulmonary hypertension.  Normal cardiac output level.  Hemodynamic data has been modified in Epic per physician review.  Mild to moderate non obstructive CAD.  Non ischemic cardiomyopathy.     CMR: 5/25/23  1. The left ventricle is severely dilated. There is increased trabeculations in the left ventricle. There  is severe global hypokinesis. The systolic function severely reduced. The LVEF is traced at 19%.   2. The right ventricle is normal in cavity size. The global systolic function is normal. The RVEF is 51%.   3. The left atrium is severely enlarged. The right atrium is normal in size.   4. There is at least moderate functional mitral regurgitation.  5. There is mild hyperenhancement in the basal septum in the pattern of mid-myocardial stripe consistent  with non ischemic fibrosis.    T1 mapping - ECV 33% (elevated).   T2 mapping - no myocardial edema present.   6. There is trivial pericardial effusion.  7. There is no intracardiac thrombus.  CONCLUSIONS:   Non-ischemic cardiomyopathy with severe LV dysfunction and normal right ventricular function, LVEF of 19%  and RVEF 51%.  No evidence of myocardial edema.        Physical Examination   BP  "115/67 (BP Location: Right arm, Patient Position: Chair, Cuff Size: Adult Regular)   Pulse 78   Wt 56.4 kg (124 lb 6.4 oz)   SpO2 96%   BMI 25.08 kg/m    Wt Readings from Last 3 Encounters:   11/02/23 56.4 kg (124 lb 6.4 oz)   10/17/23 55.8 kg (123 lb 1.6 oz)   08/23/23 56.7 kg (125 lb 1.6 oz)     General Appearance:   Alert, well-appearing and in no acute distress.   HEENT: Atraumatic, normocephalic. MMM.   Chest/Lungs:   Respirations unlabored.  Lungs are clear to auscultation.   Cardiovascular:   Regular rate and rhythm.  S1/S2. No murmur.    Abdomen:  Soft, nontender, nondistended.   Extremities: No cyanosis or clubbing. No edema.    Musculoskeletal: Moves all extremities.     Skin: Warm, dry, intact.    Neurologic: Mood and affect are appropriate.  Alert and oriented to person, place, time, and situation.          Medications  Allergies   Lipitor 20 mg daily   ASA 81 mg daily   Entresto 24-26 (1.5 tabs) twice daily   Aldactone 25 mg daily   Metoprolol succinate 25 mg daily     Metformin    No Known Allergies      Lab Results (Personally Reviewed)    Chemistry/lipid CBC Cardiac Enzymes/BNP/TSH/INR   Lab Results   Component Value Date    BUN 18.4 10/16/2023     10/16/2023    CO2 26 10/16/2023     Creatinine   Date Value Ref Range Status   10/16/2023 1.01 (H) 0.51 - 0.95 mg/dL Final       No results found for: \"CHOL\", \"HDL\", \"LDL\", \"CHOLHDL\"   Lab Results   Component Value Date    WBC 8.5 10/16/2023    HGB 13.6 10/16/2023    HCT 41.0 10/16/2023    MCV 95 10/16/2023     10/16/2023    Lab Results   Component Value Date    TSH 0.64 03/16/2023    INR 1.01 03/31/2023        The patient states understanding and is agreeable with the plan.     Darlene Barahona PA-C  [facility]  Electrophysiology Consult Service  Pager: [phone]    I spent a total of 20 minutes face to face with Kayli Morillo during today's office visit. I have spent an additional 20 minutes today on chart review " and documentation.

## 2023-11-02 ENCOUNTER — OFFICE VISIT (OUTPATIENT)
Dept: CARDIOLOGY | Facility: CLINIC | Age: 73
End: 2023-11-02
Payer: MEDICARE

## 2023-11-02 VITALS
HEART RATE: 78 BPM | DIASTOLIC BLOOD PRESSURE: 67 MMHG | OXYGEN SATURATION: 96 % | BODY MASS INDEX: 25.08 KG/M2 | WEIGHT: 124.4 LBS | SYSTOLIC BLOOD PRESSURE: 115 MMHG

## 2023-11-02 DIAGNOSIS — I42.8 NONISCHEMIC CARDIOMYOPATHY (H): ICD-10-CM

## 2023-11-02 PROCEDURE — 99215 OFFICE O/P EST HI 40 MIN: CPT

## 2023-11-02 PROCEDURE — G0463 HOSPITAL OUTPT CLINIC VISIT: HCPCS

## 2023-11-02 ASSESSMENT — PAIN SCALES - GENERAL: PAINLEVEL: NO PAIN (0)

## 2023-11-02 NOTE — PATIENT INSTRUCTIONS
You were seen in the Electrophysiology Clinic today by: Darlene RAHMAN    Plan:       Follow up Visit: February         If you have further questions, please utilize [employer] to contact us.     Your Care Team:    EP Cardiology   Telephone Number     Nurse Line  Andra Kessler, RN   Pamela Sloan, KATERINA Lugo RN   [phone]     For scheduling appointments:   Kee   [phone]   For procedure scheduling:    Love Quintanilla     [phone]   For the Device Clinic (Pacemakers, ICDs, Loop Recorders)    During business hours: [phone]  After business hours:   [phone]- select option 4 and ask for job code [id number].       On-call cardiologist for after hours or on weekends:   [phone], option #4, and ask to speak to the on-call cardiologist.     Cardiovascular Clinic:   [address] [place] [address]. [place] [address]      As always, Thank you for trusting us with your health care needs!

## 2023-11-02 NOTE — NURSING NOTE
Chief Complaint   Patient presents with    Follow Up     Return EP- 3 mo follow up for possible crtd implant     Vitals were taken and medications reconciled.    Efrain Bo, JOSEF  9:55 AM

## 2023-11-02 NOTE — LETTER
11/2/2023      RE: Kayli Morillo  [address] [facility] [address]       Dear Colleague,    Thank you for the opportunity to participate in the care of your patient, Kayli Morillo, at the [facility] [place] at [facility]. Please see a copy of my visit note below.        ELECTROPHYSIOLOGY CLINIC VISIT    Assessment/Recommendations   Assessment/Plan:    Kayli Morillo is a 72 year old female with past medical history significant for HFrEF 2/2 NICM, nonobstructive CAD from 3/2023 invasive coronary angiogram, and type II diabetes.       HFrEF 2/2 NICM, NYHA Symptom Class III Stage C  NSVT   ACE-I/ARB/ARNi: Continue entresto 12-13 mg twice daily   BB: Continue Toprol XL 25 mg daily. Had fatigue on increased dose of 37.5 mg daily   Aldosterone antagonist: Continue spironolactone 25 mg daily    SCD prophylaxis: When she was last seen by EP, she had been on GDMT ~2 weeks, initial LVEF ~14%. Repeat echo done 10/17 with significant improvement in LVEF to 30-35%. She has continued to tolerate increasing doses of GDMT. Entresto dose increased 10/17 at recent visit with Dr Schilling. Will continue to hold on ICD while patient ongoes titration to maximal tolerated GDMT. Repeat echo ordered in 3 months. Will reassess ICD implant at this time.   %BiV pacing: N/A  Fluid status: euvolemic    Follow up in 3-4 months after echo.      History of Present Illness/Subjective    MsJames Morillo is a 73 year old female who comes in today for EP follow-up of NICM, NSVT.    She was initially seen by Dr Schilling on 4/24/23. Prior to his she had been admitted to OSH in March with dyspnea and mild peripheral edema. Work up during this admission demonstrated pleural effusion and decreased LVEF. Echo on 3/16/23 revealed decreased LVEF of 14%. A subsequent coronary angiogram revealed nonobstructive CAD. A right heart cath was then done on 3/16 this demonstrated normal filling  pressures and borderline normal cardiac output. She then had cardiac MRI done fur further evaluation of decreased LVEF on 5/25 which demonstrated NICM with severe LV dysfunction and normal RV function, LVEF of 19% and RVEF 51%. When she has last seen by the heart failure team, her symptoms had been improving. Dr Schilling had ordered a ziopatch for further evaluation of arrhythmias that could be contributing to her decreased systolic function. That ziopatch demonstrated sinus rhythm with occasional NSVT runs.      EP Visit 8/1/23: She reports feeling well. She denies chest discomfort, palpitations, abdominal fullness/bloating or peripheral edema, shortness of breath, paroxysmal nocturnal dyspnea, orthopnea, lightheadedness, dizziness, pre-syncope, or syncope. Presenting 12 lead ECG shows sinus with PVCs Vent Rate 68 bpm,  ms,  ms, QTc 440 ms. Current cardiac medications include: ASA, lipitor, toprol XL, entresto and aldactone.     She presents today for follow up. She was seen in follow up by Dr Schilling on 10/17. Prior to visit she had repeat echo done with LVEF 30-35%. She has tolerated GDMT well, at last visit, Dr Schilling increased entresto dose further to 49 mg twice daily. Repeat echo ordered in 3 months. She reports feeling well, she has been able to do chores around the house. Notes considerable improvement in dyspnea since starting GDMT. Blood pressures stable, -110. She denies chest discomfort, peripheral edema, shortness of breath, orthopnea, lightheadedness or dizziness. Current cardiac medications include: lipitor, ASA, entreso, aldactone and metoprolol succinate.     I have reviewed and updated the patient's Past Medical History, Social History, Family History and Medication List.     Cardiographics (Personally Reviewed) :   Echo: 10/17/2023   Interpretation Summary  Mild left ventricular dilation is present. Left ventricular function is  decreased. The ejection fraction is 30-35% (moderately  reduced). Abnormal  septal motion consistent with left bundle branch block is present. Moderate  diffuse hypokinesis is present.  Global right ventricular function is normal.  Mild to moderate mitral insufficiency is present.  Pulmonary artery systolic pressure is normal.  Estimated mean right atrial pressure is normal.  No pericardial effusion is present.    Echo: 3/16/23  Interpretation Summary  Moderate left ventricular dilation (LVIDd 6.1cm). Severe diffuse hypokinesis.  Biplane LVEF is 14.3%.  The right ventricle is normal size and function.  Moderate mitral insufficiency is present due to dilated left ventricle.  Moderate pulmonary hypertension. Right ventricular systolic pressure is 52  mmHg.  The inferior vena cava was normal in size with preserved respiratory  variability.  There is no prior study for direct comparison. Dr. Quarles informed of the  findings.     Ziopatch: 4/25/23-5/9/23  Sinus 55 -168 bpm. 79 NSVT runs, avg 127 bpm. 2.6% PVC burden.      Coronary Angiogram/RHC 3/31/23  Right sided filling pressures are normal.  Left sided filling pressures are normal.  Mild elevated pulmonary hypertension.  Normal cardiac output level.  Hemodynamic data has been modified in Epic per physician review.  Mild to moderate non obstructive CAD.  Non ischemic cardiomyopathy.     CMR: 5/25/23  1. The left ventricle is severely dilated. There is increased trabeculations in the left ventricle. There  is severe global hypokinesis. The systolic function severely reduced. The LVEF is traced at 19%.   2. The right ventricle is normal in cavity size. The global systolic function is normal. The RVEF is 51%.   3. The left atrium is severely enlarged. The right atrium is normal in size.   4. There is at least moderate functional mitral regurgitation.  5. There is mild hyperenhancement in the basal septum in the pattern of mid-myocardial stripe consistent  with non ischemic fibrosis.    T1 mapping - ECV 33% (elevated).   T2  "mapping - no myocardial edema present.   6. There is trivial pericardial effusion.  7. There is no intracardiac thrombus.  CONCLUSIONS:   Non-ischemic cardiomyopathy with severe LV dysfunction and normal right ventricular function, LVEF of 19%  and RVEF 51%.  No evidence of myocardial edema.        Physical Examination   /67 (BP Location: Right arm, Patient Position: Chair, Cuff Size: Adult Regular)   Pulse 78   Wt 56.4 kg (124 lb 6.4 oz)   SpO2 96%   BMI 25.08 kg/m    Wt Readings from Last 3 Encounters:   11/02/23 56.4 kg (124 lb 6.4 oz)   10/17/23 55.8 kg (123 lb 1.6 oz)   08/23/23 56.7 kg (125 lb 1.6 oz)     General Appearance:   Alert, well-appearing and in no acute distress.   HEENT: Atraumatic, normocephalic. MMM.   Chest/Lungs:   Respirations unlabored.  Lungs are clear to auscultation.   Cardiovascular:   Regular rate and rhythm.  S1/S2. No murmur.    Abdomen:  Soft, nontender, nondistended.   Extremities: No cyanosis or clubbing. No edema.    Musculoskeletal: Moves all extremities.     Skin: Warm, dry, intact.    Neurologic: Mood and affect are appropriate.  Alert and oriented to person, place, time, and situation.          Medications  Allergies   Lipitor 20 mg daily   ASA 81 mg daily   Entresto 24-26 (1.5 tabs) twice daily   Aldactone 25 mg daily   Metoprolol succinate 25 mg daily     Metformin    No Known Allergies      Lab Results (Personally Reviewed)    Chemistry/lipid CBC Cardiac Enzymes/BNP/TSH/INR   Lab Results   Component Value Date    BUN 18.4 10/16/2023     10/16/2023    CO2 26 10/16/2023     Creatinine   Date Value Ref Range Status   10/16/2023 1.01 (H) 0.51 - 0.95 mg/dL Final       No results found for: \"CHOL\", \"HDL\", \"LDL\", \"CHOLHDL\"   Lab Results   Component Value Date    WBC 8.5 10/16/2023    HGB 13.6 10/16/2023    HCT 41.0 10/16/2023    MCV 95 10/16/2023     10/16/2023    Lab Results   Component Value Date    TSH 0.64 03/16/2023    INR 1.01 03/31/2023        The " patient states understanding and is agreeable with the plan.     Darlene Barahona PA-C  [facility]  Electrophysiology Consult Service  Pager: [phone]    I spent a total of 20 minutes face to face with Kayli Morillo during today's office visit. I have spent an additional 20 minutes today on chart review and documentation.                     Please do not hesitate to contact me if you have any questions/concerns.     Sincerely,     Darlene Barahona PA-C

## 2023-11-09 ENCOUNTER — TELEPHONE (OUTPATIENT)
Dept: CARDIOLOGY | Facility: CLINIC | Age: 73
End: 2023-11-09
Payer: MEDICARE

## 2023-11-09 DIAGNOSIS — I50.20 HEART FAILURE WITH REDUCED EJECTION FRACTION, NYHA CLASS III (H): ICD-10-CM

## 2023-11-09 DIAGNOSIS — R06.02 SOB (SHORTNESS OF BREATH): ICD-10-CM

## 2023-11-09 RX ORDER — SACUBITRIL AND VALSARTAN 24; 26 MG/1; MG/1
1.5 TABLET, FILM COATED ORAL 2 TIMES DAILY
Qty: 270 TABLET | Refills: 3 | Status: SHIPPED | OUTPATIENT
Start: 2023-11-09 | End: 2024-01-05

## 2023-11-09 NOTE — TELEPHONE ENCOUNTER
I called Kayli regarding this message. She only has one dose of Entresto remaining. She gets her Entresto from the [employer].    I called [facility] who told me her next shipment is due to go out in late December.     Through chart review, it appears that her Entresto dose was increased in late October, but the new prescription was not sent to [facility]. I sent Kayli's current dose on a new prescription to [facility]. The pharmacy takes 24-48 hours to process this request. At that time, they will call Kayli to set up shipment. I was told that if Kayli tells them she is out of the medication, they can expedite this to be sent overnight. Kayli expressed understanding.    I did call Kayli's local [facility] to see how much a 1 week supply of Entresto would cost. 21 tablets of Entresto (1 week supply) would cost $261.92. This is not affordable for Kayli.    Kayli did tell me that she has a flight on Wednesday so would need medication prior to that time.    Linn Hill RN

## 2023-11-09 NOTE — TELEPHONE ENCOUNTER
Health Call Center    Phone Message    May a detailed message be left on voicemail: yes     Reason for Call: Medication Refill Request    Has the patient contacted the pharmacy for the refill? Yes   Name of medication being requested: sacubitril-valsartan (ENTRESTO) 24-26 MG per tablet   Provider who prescribed the medication: Dr. Schilling  Pharmacy:        Date medication is needed: 11/9/23  Patient is needing her Enstresto refilled. She has 1 pill left and is not sure how to get her refills as she uses the assistance program for this rx. Please call the patient today as she will be out of medication.       Action Taken: Other: cardiology    Travel Screening: Not Applicable  Thank you!  [facility]

## 2023-11-10 NOTE — TELEPHONE ENCOUNTER
Called and spoke with pharmacy who state they are expediting the request and will call the patient today to set up delivery. Called and notified patient. Patient also encouraged to reach out to [employer] if she has not heard from them by 5:00 today per [employer] representatives request. The contact number was given to patient.

## 2023-11-21 DIAGNOSIS — I50.20 HEART FAILURE WITH REDUCED EJECTION FRACTION, NYHA CLASS III (H): Primary | ICD-10-CM

## 2023-11-27 NOTE — PROGRESS NOTES
Virtual Visit Details    Type of service:  Telephone Visit   Phone call duration: 13 minutes   Start time 9:37 AM  End time 9:50 AM    HPI:   Ms. Morillo is a 73 year old female with a past medical history including HFrEF 2/2 NICM, nonobstructive CAD from 3/2023 invasive coronary angiogram, and T2DM who presents for evaluation of chronic systolic heart failure.    She was seen by Dr. Schilling on 10/17/2023. Entresto was increased. She saw EP and was planning to repeat ECHO 3 month after last medication change and then reassess ICD need at that time.     She has been out of entresto since 11/10. She feels that it was causing stomach upset and rhinorrhea that has resolved since stopping it. No SOB at rest.  She can walk 6-8 blocks before she would feel CERON. No LE edema or abdominal edema. Her orthopnea is resolved. No more PND. No lightheadedness, dizziness, pre-syncope, or syncope. No palpitations. No chest pain. Appetite is normal.    SBP today was 112. Hrs are 60s.    Weights was 125 after lots of holiday baking. Already coming down to 123. (Previously 121).    Cardiac Medications  Aldactone 25 mg daily  Metoprolol succinate 25 mg daily  Entresto 24-26 BID (different than prescribed)- Hasn't taken since 11/10 since she ran out.  Lipitor 20 mg daily  ASA 81 mg daily    PAST MEDICAL HISTORY:  Past Medical History:   Diagnosis Date    Chronic systolic heart failure (H)     DM2 (diabetes mellitus, type 2) (H)     Gastroesophageal reflux disease without esophagitis     Osteopenia        FAMILY HISTORY:  Family History   Problem Relation Age of Onset    Diabetes Mother     Cerebrovascular Disease Father     Alzheimer Disease Father        SOCIAL HISTORY:  Social History     Socioeconomic History    Marital status:    Tobacco Use    Smoking status: Never     Passive exposure: Past    Smokeless tobacco: Never       CURRENT MEDICATIONS:  aspirin (ASA) 81 MG chewable tablet,   atorvastatin (LIPITOR) 20 MG tablet, Take 1  "tablet (20 mg) by mouth daily  Calcium Carb-Cholecalciferol 500-10 MG-MCG TABS,   metFORMIN (GLUCOPHAGE) 500 MG tablet, 2 times daily (with meals)  metoprolol succinate ER (TOPROL XL) 25 MG 24 hr tablet, Take 1 tablet (25 mg) by mouth daily  Multiple Vitamins-Minerals (ICAPS AREDS 2 PO),   sacubitril-valsartan (ENTRESTO) 24-26 MG per tablet, Take 1.5 tablets by mouth 2 times daily  spironolactone (ALDACTONE) 25 MG tablet, Take 1 tablet (25 mg) by mouth daily    No current facility-administered medications on file prior to visit.      ROS:   Refer to HPI    EXAM:  There were no vitals taken for this visit.  N/A video visit    Labs, reviewed with patient in clinic today:  CBC RESULTS:  Lab Results   Component Value Date    WBC 8.5 10/16/2023    RBC 4.31 10/16/2023    HGB 13.6 10/16/2023    HCT 41.0 10/16/2023    MCV 95 10/16/2023    MCH 31.6 10/16/2023    MCHC 33.2 10/16/2023    RDW 12.5 10/16/2023     10/16/2023       CMP RESULTS:  Lab Results   Component Value Date     10/16/2023    POTASSIUM 4.7 10/16/2023    CHLORIDE 103 10/16/2023    CHLORIDE 101 10/06/2023    CO2 26 10/16/2023    ANIONGAP 12 10/16/2023     (H) 10/16/2023     (H) 03/31/2023    BUN 18.4 10/16/2023    CR 1.01 (H) 10/16/2023    GFRESTIMATED 58 (L) 10/16/2023    JULIANNE 10.0 10/16/2023    BILITOTAL 0.2 10/16/2023    ALBUMIN 4.2 10/16/2023    ALKPHOS 109 (H) 10/16/2023    ALT 9 10/16/2023    AST 17 10/16/2023        INR RESULTS:  Lab Results   Component Value Date    INR 1.01 03/31/2023       No results found for: \"MAG\"  No results found for: \"NTBNPI\"  Lab Results   Component Value Date    NTBNP 511 10/16/2023       Diagnostics:   10/17/23 ECHO  Interpretation Summary  Mild left ventricular dilation is present. Left ventricular function is  decreased. The ejection fraction is 30-35% (moderately reduced). Abnormal  septal motion consistent with left bundle branch block is present. Moderate  diffuse hypokinesis is present.  Global " right ventricular function is normal.  Mild to moderate mitral insufficiency is present.  Pulmonary artery systolic pressure is normal.  Estimated mean right atrial pressure is normal.  No pericardial effusion is present.    5/25/23 cMRI  1. The left ventricle is severely dilated. There is increased trabeculations in the left ventricle. There  is severe global hypokinesis. The systolic function severely reduced. The LVEF is traced at 19%.      2. The right ventricle is normal in cavity size. The global systolic function is normal. The RVEF is 51%.      3. The left atrium is severely enlarged. The right atrium is normal in size.      4. There is at least moderate functional mitral regurgitation.     5. There is mild hyperenhancement in the basal septum in the pattern of mid-myocardial stripe consistent  with non ischemic fibrosis.    T1 mapping - ECV 33% (elevated).   T2 mapping - no myocardial edema present.      6. There is trivial pericardial effusion.     7. There is no intracardiac thrombus.     CONCLUSIONS:   Non-ischemic cardiomyopathy with severe LV dysfunction and normal right ventricular function, LVEF of 19%  and RVEF 51%.  No evidence of myocardial edema.      CORE EXAM    3/16/23 ECHO  Interpretation Summary  Moderate left ventricular dilation (LVIDd 6.1cm). Severe diffuse hypokinesis.  Biplane LVEF is 14.3%.  The right ventricle is normal size and function.  Moderate mitral insufficiency is present due to dilated left ventricle.  Moderate pulmonary hypertension. Right ventricular systolic pressure is 52  mmHg.  The inferior vena cava was normal in size with preserved respiratory  variability.     There is no prior study for direct comparison. Dr. Quarles informed of the  Findings.    3/31/23 Coronary angiogram and RHC  Diagnostic  Dominance: Right  Left Main   The vessel was visualized by selective angiography, is moderate in size and is angiographically normal.      Left Anterior Descending   The  vessel is moderate in size.   Prox LAD lesion is 20% stenosed. The lesion is focal.   Prox LAD to Mid LAD lesion is 25% stenosed with 65% stenosed side branch in 1st Diag.      First Diagonal Branch   The vessel is moderate in size.      First Septal Branch   The vessel is small and is angiographically normal.      Second Diagonal Branch   The vessel is small.      Second Septal Branch   The vessel is small and is angiographically normal.      Third Diagonal Branch   The vessel is small and is angiographically normal.      Left Circumflex   The vessel is moderate in size and is angiographically normal.      First Obtuse Marginal Branch   The vessel is small and is angiographically normal.      Second Obtuse Marginal Branch   The vessel is moderate in size.   2nd Mrg lesion is 40% stenosed.      Lateral Second Obtuse Marginal Branch   The vessel is moderate in size and is angiographically normal.      Third Obtuse Marginal Branch   The vessel is moderate in size and is angiographically normal.      Fourth Obtuse Marginal Branch   The vessel is small and is angiographically normal.      Right Coronary Artery   The vessel was visualized by selective angiography and is moderate in size.   Mid RCA lesion is 20% stenosed.      Acute Marginal Branch   The vessel is moderate in size and is angiographically normal.      Right Ventricular Branch   The vessel is small and is angiographically normal.      Right Posterior Descending Artery   The vessel is moderate in size and is angiographically normal.      Right Posterior Atrioventricular Artery   The vessel is small and is angiographically normal.      NIBP 117/56/78  RA --/--/3  RV 42/3  PA 42/14/24  PCWP --/--/10  PA Sat 62%  West CO/CI 3.5/2.2  TD CO/CI 3.6/2.3  SVR 1444 (TD) dynes  PVR 3.6 (TD) AMOR     Right sided filling pressures are normal. Left sided filling pressures are normal. Mild elevated pulmonary hypertension. Normal cardiac output level. Hemodynamic data has  been modified in [facility] per physician review.      Assessment and Plan:   Ms. Morillo is a 73 year old female with a past medical history including HFrEF 2/2 NICM, nonobstructive CAD from 3/2023 invasive coronary angiogram, and T2DM who presents for evaluation of chronic systolic heart failure.    She has been referred for ICD- they are going to monitor and revisit the subject after her next echo. She is euvolemic. She has been off entresto for 2 weekend due to cost. WE are looking into her patient assistance. In the meantime we will start losartan. She also notes that she had some gi upset and rhiniorrea that she attributed to entresto and improved off of it. She is willing to retrial if it is affordable, but we will need to look out for recurrence of these symptoms.    # Chronic systolic heart failure/HFrEF secondary to NICM. EF bertha of 15-20%, improved to 30-35% as of 10/2023  Stage C. NYHA Class III.    Fluid status: euvolemic, off loop diuretics   ACEi/ARB/ARNI: Holding entresto 24-26 mg BID d/t cost. Will look into her patient assistance program. Start losartan 25 mg daily  BB: ongoing titration, for now, continue metoprolol xl 25 mg daily  Aldosterone: aldactone 25 mg daily   SGLT2i- cost prohibitive  SCD prophylaxis: referred to EP for consideration of ICD by Dr. Schilling, planning to reassess after next echo. Not meeting criteria for secondary prevention with her NSVT per EP.  NSAID use: contraindicated  Sleep apnea evaluation: referred to sleep clinic (outside referall)  Remote monitoring: Would consider in the future if more volume concerns, currently I don't think this is needed    # T2DM  - Will need to communicate with her PCP if we are able to add an SGLT2i (she is on metformin). Currently cost prohibitive.    # Non-obstructive coronary artery disease as per 2023 angiogram  - Continue ASA and lipitor    # CKD stage 2  - Cr 1.01, stable with recent b/l      Follow up   - Labs for  todays visit  - Labs in  2 weeks (started losartan, consider increase)  - DR Schilling in [place] as scheduled    Billing  - I spent 13 minutes via telephone with this patient      Eden Pugh PA-C  [facility]          CC  ANA LEWIS

## 2023-11-29 ENCOUNTER — CARE COORDINATION (OUTPATIENT)
Dept: CARDIOLOGY | Facility: CLINIC | Age: 73
End: 2023-11-29

## 2023-11-29 ENCOUNTER — VIRTUAL VISIT (OUTPATIENT)
Dept: CARDIOLOGY | Facility: CLINIC | Age: 73
End: 2023-11-29
Attending: PHYSICIAN ASSISTANT
Payer: MEDICARE

## 2023-11-29 ENCOUNTER — MYC MEDICAL ADVICE (OUTPATIENT)
Dept: CARDIOLOGY | Facility: CLINIC | Age: 73
End: 2023-11-29

## 2023-11-29 VITALS
WEIGHT: 124 LBS | SYSTOLIC BLOOD PRESSURE: 112 MMHG | HEIGHT: 59 IN | DIASTOLIC BLOOD PRESSURE: 57 MMHG | HEART RATE: 67 BPM | BODY MASS INDEX: 25 KG/M2

## 2023-11-29 DIAGNOSIS — I50.20 HEART FAILURE WITH REDUCED EJECTION FRACTION, NYHA CLASS III (H): Primary | ICD-10-CM

## 2023-11-29 DIAGNOSIS — I50.20 HEART FAILURE WITH REDUCED EJECTION FRACTION, NYHA CLASS III (H): ICD-10-CM

## 2023-11-29 PROCEDURE — 99442 PR PHYSICIAN TELEPHONE EVALUATION 11-20 MIN: CPT | Mod: 95 | Performed by: PHYSICIAN ASSISTANT

## 2023-11-29 RX ORDER — LOSARTAN POTASSIUM 25 MG/1
25 TABLET ORAL DAILY
Qty: 90 TABLET | OUTPATIENT
Start: 2023-11-29

## 2023-11-29 RX ORDER — LOSARTAN POTASSIUM 25 MG/1
25 TABLET ORAL DAILY
Qty: 30 TABLET | Refills: 0 | Status: SHIPPED | OUTPATIENT
Start: 2023-11-29 | End: 2023-11-30

## 2023-11-29 ASSESSMENT — PAIN SCALES - GENERAL: PAINLEVEL: NO PAIN (0)

## 2023-11-29 NOTE — LETTER
11/29/2023      RE: Kayli Morillo  [address] [facility] [address]       Dear Colleague,    Thank you for the opportunity to participate in the care of your patient, Kayli Morillo, at the [facility]. Please see a copy of my visit note below.    Virtual Visit Details    Type of service:  Telephone Visit   Phone call duration: 13 minutes   Start time 9:37 AM  End time 9:50 AM    HPI:   Ms. Morillo is a 73 year old female with a past medical history including HFrEF 2/2 NICM, nonobstructive CAD from 3/2023 invasive coronary angiogram, and T2DM who presents for evaluation of chronic systolic heart failure.    She was seen by Dr. Schilling on 10/17/2023. Entresto was increased. She saw EP and was planning to repeat ECHO 3 month after last medication change and then reassess ICD need at that time.     She has been out of entresto since 11/10. She feels that it was causing stomach upset and rhinorrhea that has resolved since stopping it. No SOB at rest.  She can walk 6-8 blocks before she would feel CERON. No LE edema or abdominal edema. Her orthopnea is resolved. No more PND. No lightheadedness, dizziness, pre-syncope, or syncope. No palpitations. No chest pain. Appetite is normal.    SBP today was 112. Hrs are 60s.    Weights was 125 after lots of holiday baking. Already coming down to 123. (Previously 121).    Cardiac Medications  Aldactone 25 mg daily  Metoprolol succinate 25 mg daily  Entresto 24-26 BID (different than prescribed)- Hasn't taken since 11/10 since she ran out.  Lipitor 20 mg daily  ASA 81 mg daily    PAST MEDICAL HISTORY:  Past Medical History:   Diagnosis Date    Chronic systolic heart failure (H)     DM2 (diabetes mellitus, type 2) (H)     Gastroesophageal reflux disease without esophagitis     Osteopenia        FAMILY HISTORY:  Family History   Problem Relation Age of Onset    Diabetes Mother     Cerebrovascular  "Disease Father     Alzheimer Disease Father        SOCIAL HISTORY:  Social History     Socioeconomic History    Marital status:    Tobacco Use    Smoking status: Never     Passive exposure: Past    Smokeless tobacco: Never       CURRENT MEDICATIONS:  aspirin (ASA) 81 MG chewable tablet,   atorvastatin (LIPITOR) 20 MG tablet, Take 1 tablet (20 mg) by mouth daily  Calcium Carb-Cholecalciferol 500-10 MG-MCG TABS,   metFORMIN (GLUCOPHAGE) 500 MG tablet, 2 times daily (with meals)  metoprolol succinate ER (TOPROL XL) 25 MG 24 hr tablet, Take 1 tablet (25 mg) by mouth daily  Multiple Vitamins-Minerals (ICAPS AREDS 2 PO),   sacubitril-valsartan (ENTRESTO) 24-26 MG per tablet, Take 1.5 tablets by mouth 2 times daily  spironolactone (ALDACTONE) 25 MG tablet, Take 1 tablet (25 mg) by mouth daily    No current facility-administered medications on file prior to visit.      ROS:   Refer to HPI    EXAM:  There were no vitals taken for this visit.  N/A video visit    Labs, reviewed with patient in clinic today:  CBC RESULTS:  Lab Results   Component Value Date    WBC 8.5 10/16/2023    RBC 4.31 10/16/2023    HGB 13.6 10/16/2023    HCT 41.0 10/16/2023    MCV 95 10/16/2023    MCH 31.6 10/16/2023    MCHC 33.2 10/16/2023    RDW 12.5 10/16/2023     10/16/2023       CMP RESULTS:  Lab Results   Component Value Date     10/16/2023    POTASSIUM 4.7 10/16/2023    CHLORIDE 103 10/16/2023    CHLORIDE 101 10/06/2023    CO2 26 10/16/2023    ANIONGAP 12 10/16/2023     (H) 10/16/2023     (H) 03/31/2023    BUN 18.4 10/16/2023    CR 1.01 (H) 10/16/2023    GFRESTIMATED 58 (L) 10/16/2023    JULIANNE 10.0 10/16/2023    BILITOTAL 0.2 10/16/2023    ALBUMIN 4.2 10/16/2023    ALKPHOS 109 (H) 10/16/2023    ALT 9 10/16/2023    AST 17 10/16/2023        INR RESULTS:  Lab Results   Component Value Date    INR 1.01 03/31/2023       No results found for: \"MAG\"  No results found for: \"NTBNPI\"  Lab Results   Component Value Date    " NTBNP 511 10/16/2023       Diagnostics:   10/17/23 ECHO  Interpretation Summary  Mild left ventricular dilation is present. Left ventricular function is  decreased. The ejection fraction is 30-35% (moderately reduced). Abnormal  septal motion consistent with left bundle branch block is present. Moderate  diffuse hypokinesis is present.  Global right ventricular function is normal.  Mild to moderate mitral insufficiency is present.  Pulmonary artery systolic pressure is normal.  Estimated mean right atrial pressure is normal.  No pericardial effusion is present.    5/25/23 cMRI  1. The left ventricle is severely dilated. There is increased trabeculations in the left ventricle. There  is severe global hypokinesis. The systolic function severely reduced. The LVEF is traced at 19%.      2. The right ventricle is normal in cavity size. The global systolic function is normal. The RVEF is 51%.      3. The left atrium is severely enlarged. The right atrium is normal in size.      4. There is at least moderate functional mitral regurgitation.     5. There is mild hyperenhancement in the basal septum in the pattern of mid-myocardial stripe consistent  with non ischemic fibrosis.    T1 mapping - ECV 33% (elevated).   T2 mapping - no myocardial edema present.      6. There is trivial pericardial effusion.     7. There is no intracardiac thrombus.     CONCLUSIONS:   Non-ischemic cardiomyopathy with severe LV dysfunction and normal right ventricular function, LVEF of 19%  and RVEF 51%.  No evidence of myocardial edema.      CORE EXAM    3/16/23 ECHO  Interpretation Summary  Moderate left ventricular dilation (LVIDd 6.1cm). Severe diffuse hypokinesis.  Biplane LVEF is 14.3%.  The right ventricle is normal size and function.  Moderate mitral insufficiency is present due to dilated left ventricle.  Moderate pulmonary hypertension. Right ventricular systolic pressure is 52  mmHg.  The inferior vena cava was normal in size with  preserved respiratory  variability.     There is no prior study for direct comparison. Dr. Quarles informed of the  Findings.    3/31/23 Coronary angiogram and RHC  Diagnostic  Dominance: Right  Left Main   The vessel was visualized by selective angiography, is moderate in size and is angiographically normal.      Left Anterior Descending   The vessel is moderate in size.   Prox LAD lesion is 20% stenosed. The lesion is focal.   Prox LAD to Mid LAD lesion is 25% stenosed with 65% stenosed side branch in 1st Diag.      First Diagonal Branch   The vessel is moderate in size.      First Septal Branch   The vessel is small and is angiographically normal.      Second Diagonal Branch   The vessel is small.      Second Septal Branch   The vessel is small and is angiographically normal.      Third Diagonal Branch   The vessel is small and is angiographically normal.      Left Circumflex   The vessel is moderate in size and is angiographically normal.      First Obtuse Marginal Branch   The vessel is small and is angiographically normal.      Second Obtuse Marginal Branch   The vessel is moderate in size.   2nd Mrg lesion is 40% stenosed.      Lateral Second Obtuse Marginal Branch   The vessel is moderate in size and is angiographically normal.      Third Obtuse Marginal Branch   The vessel is moderate in size and is angiographically normal.      Fourth Obtuse Marginal Branch   The vessel is small and is angiographically normal.      Right Coronary Artery   The vessel was visualized by selective angiography and is moderate in size.   Mid RCA lesion is 20% stenosed.      Acute Marginal Branch   The vessel is moderate in size and is angiographically normal.      Right Ventricular Branch   The vessel is small and is angiographically normal.      Right Posterior Descending Artery   The vessel is moderate in size and is angiographically normal.      Right Posterior Atrioventricular Artery   The vessel is small and is  angiographically normal.      NIBP 117/56/78  RA --/--/3  RV 42/3  PA 42/14/24  PCWP --/--/10  PA Sat 62%  West CO/CI 3.5/2.2  TD CO/CI 3.6/2.3  SVR 1444 (TD) dynes  PVR 3.6 (TD) AMOR     Right sided filling pressures are normal. Left sided filling pressures are normal. Mild elevated pulmonary hypertension. Normal cardiac output level. Hemodynamic data has been modified in Epic per physician review.      Assessment and Plan:   Ms. Morillo is a 73 year old female with a past medical history including HFrEF 2/2 NICM, nonobstructive CAD from 3/2023 invasive coronary angiogram, and T2DM who presents for evaluation of chronic systolic heart failure.    She has been referred for ICD- they are going to monitor and revisit the subject after her next echo. She is euvolemic. She has been off entresto for 2 weekend due to cost. WE are looking into her patient assistance. In the meantime we will start losartan. She also notes that she had some gi upset and rhiniorrea that she attributed to entresto and improved off of it. She is willing to retrial if it is affordable, but we will need to look out for recurrence of these symptoms.    # Chronic systolic heart failure/HFrEF secondary to NICM. EF bertha of 15-20%, improved to 30-35% as of 10/2023  Stage C. NYHA Class III.    Fluid status: euvolemic, off loop diuretics   ACEi/ARB/ARNI: Holding entresto 24-26 mg BID d/t cost. Will look into her patient assistance program. Start losartan 25 mg daily  BB: ongoing titration, for now, continue metoprolol xl 25 mg daily  Aldosterone: aldactone 25 mg daily   SGLT2i- cost prohibitive  SCD prophylaxis: referred to EP for consideration of ICD by Dr. Schilling, planning to reassess after next echo. Not meeting criteria for secondary prevention with her NSVT per EP.  NSAID use: contraindicated  Sleep apnea evaluation: referred to sleep clinic (outside referall)  Remote monitoring: Would consider in the future if more volume concerns, currently I  don't think this is needed    # T2DM  - Will need to communicate with her PCP if we are able to add an SGLT2i (she is on metformin). Currently cost prohibitive.    # Non-obstructive coronary artery disease as per 2023 angiogram  - Continue ASA and lipitor    # CKD stage 2  - Cr 1.01, stable with recent b/l      Follow up   - Labs for  todays visit  - Labs in 2 weeks (started losartan, consider increase)  - DR Schilling in Jan as scheduled    Billing  - I spent 13 minutes via telephone with this patient      Eden Pugh PA-C  [facility]          CC  POWER, ANA PERKINS

## 2023-11-29 NOTE — PATIENT INSTRUCTIONS
Take your medicines every day, as directed    Changes made today:  We will let you know what hear from the entresto    If we can't use entresto, we will start you on a medication called losartan   Monitor Your Weight and Symptoms    Contact us if you:    Gain 2 pounds in one day or 5 pounds in one week  Feel more short of breath  Notice more leg swelling  Feel lightheadeded   Change your lifestyle    Limit Salt or Sodium:  2000 mg  Limit Fluids:  2000 mL or approximately 64 ounces  Eat a Heart Healthy Diet  Low in saturated fats  Stay Active:  Aim to move at least 150 minutes every  week         To Contact us    During Business Hours:  [phone], option # 1      After hours, weekends or holidays:   [phone], Option #4  Ask to speak to the On-Call Cardiologist. Inform them you are a CORE/heart failure patient at the [facility].     Use [employer] allows you to communicate directly with your heart team through secure messaging.  [email or web address] can be accessed any time on your phone, computer, or tablet.  If you need assistance, we'd be happy to help!         Keep your Heart Appointments:    - Please get labs for today's visit    - CORE clinic via virtual visit in 1 month    - Dr. Schilling in January as scheduled      Please consider attending our virtual support group which is held monthly. Please reach out to Olvin at [phone] for more information if you are interested in attending.       2023 dates:      - Monday, December 4th, 1-2pm

## 2023-11-29 NOTE — NURSING NOTE
Additional pt reported vitals from today:     109/54  HR: 69  112/57  HR: 67  110/55  HR: 66    Is the patient currently in the state of [place]? YES    Visit mode:TELEPHONE    If the visit is dropped, the patient can be reconnected by: TELEPHONE VISIT: Phone number:   Telephone Information:   Mobile [phone]       Will anyone else be joining the visit? NO  (If patient encounters technical issues they should call [phone])    How would you like to obtain your AVS? MyChart    Are changes needed to the allergy or medication list? No    Patient declined individual allergy and medication review by support staff because patient denies any changes since echeck-in completion and states all information entered during echeck-in remains accurate.    Reason for visit: DMITRIY Ivy MA VVF

## 2023-11-29 NOTE — PROGRESS NOTES
Spoke with [facility] pharmacy. They report Kayli has active refills for Entresto but not due for next refill until 12/26.   The dose was increased on 10/17, but not sent to pharmacy till 11/9. Updated the dates with them for the increased dose, they are now able to ship delivery.   Will go out today for delivery tomorrow.     Called Kayli to update her. Left message and asked her to hold off on picking up the losartan if she is getting the Entresto tomorrow.   Usha Martino RN

## 2023-11-29 NOTE — NURSING NOTE
Tried to call Entresto patient assistance pharmacy to clarify issues on filling Entresto. Was on hold for half hour and no answer, will try again later.   In meantime, per Justina, should start Losartan 25 mg daily.

## 2023-11-30 ENCOUNTER — TELEPHONE (OUTPATIENT)
Dept: CARDIOLOGY | Facility: CLINIC | Age: 73
End: 2023-11-30
Payer: MEDICARE

## 2023-11-30 NOTE — TELEPHONE ENCOUNTER
11/30 Scheduled pt for telephone Return CORE visit w/ Justina Pugh for 12/22/23. Pt will arrange lab at a different facility prior. MB

## 2023-11-30 NOTE — TELEPHONE ENCOUNTER
Labs resulted.   Per provider:     Labs are stable. She should be adressing her blood sugar with her PCP. No changes to our entresto plan- start 12-13 mg BID. BMP in 2 weeks and will work on re-titrating. DO NOT start losartan.

## 2023-11-30 NOTE — TELEPHONE ENCOUNTER
Reviewed with Justina goodwin, will start at half tablet 24/26 mg BID and titrate up. Reviewed this with Kayli. Asked her to call if she doesn't have it by afternoon tomorrow. She will hold off on picking up losartan for now.     She did get labs and will watch for lab results. Usha Martino RN

## 2023-11-30 NOTE — TELEPHONE ENCOUNTER
----- Message from Usha Martino RN sent at 11/30/2023  7:00 AM CST -----  Regarding: RE: Return CORE 1 month followup  Hi - yes good question. Core doesn't template for virtual, but ok to use a spot with Justina in a month. We do want to see her, lined up so she has monthly visits, so keep the roger appt and add this one in.   Maybe her 12/22 [facility]? You can use one of those spots since it's virtual. Or the week after victorino if she has openings here.   Thanks!     ----- Message -----  From: Madeline Mary  Sent: 11/29/2023   5:18 PM CST  To: Usha Martino RN  Subject: Return CORE 1 month followup                     Hi,    I'm working on the WQ and I see that this pt needs a 1 month virtual followup w/ CORE but there's also a Return HF appt scheduled for 1/23. Just checking if this visit is still appropriate before I schedule. I also see that there's virtual appts for today and tomorrow morning, but I don't see any other virtual appts even with offering all of the CORE APPs. Can a virtual visit be added to a Return CORE in-person slot? Please let me know what I can do for this pt.    Thank you,    Madeline Mary

## 2023-12-01 NOTE — TELEPHONE ENCOUNTER
Called cassy to follow up as didn't read mychart. She did get Entresto and will start it. Half tab twice daily. Labs in two weeks.

## 2023-12-08 ENCOUNTER — DOCUMENTATION ONLY (OUTPATIENT)
Dept: CARDIOLOGY | Facility: CLINIC | Age: 73
End: 2023-12-08
Payer: MEDICARE

## 2023-12-18 ENCOUNTER — CARE COORDINATION (OUTPATIENT)
Dept: CARDIOLOGY | Facility: CLINIC | Age: 73
End: 2023-12-18
Payer: MEDICARE

## 2023-12-18 NOTE — PROGRESS NOTES
"Kayli is due for labs after restarting Entresto at half tablet twice daily.   When she ran out last time she was having some \"gassy gut\" that she attributed to Entresto. It went away while she was off it. Now that has returned, but not as strong as it was because her dose is lower. Told her we can discuss this more on Friday at CORE phone visit  She reports no worsening HF symptoms, but has gained about 3 lbs because of 2 different baking sessions. She is working on that.     She did get approved for patient assistance for next year for Entresto.     Is due for labs after restarting it and she will go get them in the next day or two.           "

## 2023-12-22 ENCOUNTER — VIRTUAL VISIT (OUTPATIENT)
Dept: CARDIOLOGY | Facility: CLINIC | Age: 73
End: 2023-12-22
Attending: PHYSICIAN ASSISTANT
Payer: MEDICARE

## 2023-12-22 VITALS
WEIGHT: 124.2 LBS | DIASTOLIC BLOOD PRESSURE: 54 MMHG | HEIGHT: 59 IN | HEART RATE: 71 BPM | BODY MASS INDEX: 25.04 KG/M2 | SYSTOLIC BLOOD PRESSURE: 103 MMHG

## 2023-12-22 DIAGNOSIS — I42.8 NONISCHEMIC CARDIOMYOPATHY (H): ICD-10-CM

## 2023-12-22 DIAGNOSIS — I49.3 PVC'S (PREMATURE VENTRICULAR CONTRACTIONS): ICD-10-CM

## 2023-12-22 DIAGNOSIS — I10 BENIGN ESSENTIAL HYPERTENSION: ICD-10-CM

## 2023-12-22 DIAGNOSIS — I47.29 PAROXYSMAL VENTRICULAR TACHYCARDIA (H): ICD-10-CM

## 2023-12-22 DIAGNOSIS — I50.20 HEART FAILURE WITH REDUCED EJECTION FRACTION, NYHA CLASS III (H): ICD-10-CM

## 2023-12-22 DIAGNOSIS — E78.2 MIXED HYPERLIPIDEMIA: ICD-10-CM

## 2023-12-22 PROCEDURE — 99442 PR PHYSICIAN TELEPHONE EVALUATION 11-20 MIN: CPT | Mod: 95 | Performed by: PHYSICIAN ASSISTANT

## 2023-12-22 RX ORDER — METOPROLOL SUCCINATE 25 MG/1
37.5 TABLET, EXTENDED RELEASE ORAL DAILY
Qty: 135 TABLET | Refills: 1 | Status: SHIPPED | OUTPATIENT
Start: 2023-12-22 | End: 2024-07-15

## 2023-12-22 ASSESSMENT — PAIN SCALES - GENERAL: PAINLEVEL: NO PAIN (0)

## 2023-12-22 NOTE — PATIENT INSTRUCTIONS
Take your medicines every day, as directed    Changes made today:  Increase metoprolol succinate to 37.5 mg once a day (1.5 tabs)     Monitor Your Weight and Symptoms    Contact us if you:    Gain 2 pounds in one day or 5 pounds in one week  Feel more short of breath  Notice more leg swelling  Feel lightheadeded   Change your lifestyle    Limit Salt or Sodium:  2000 mg  Limit Fluids:  2000 mL or approximately 64 ounces  Eat a Heart Healthy Diet  Low in saturated fats  Stay Active:  Aim to move at least 150 minutes every  week         To Contact us    During Business Hours:  [phone], option # 1      After hours, weekends or holidays:   [phone], Option #4  Ask to speak to the On-Call Cardiologist. Inform them you are a CORE/heart failure patient at the [facility].     Use [employer] allows you to communicate directly with your heart team through secure messaging.  [employer] can be accessed any time on your phone, computer, or tablet.  If you need assistance, we'd be happy to help!         Keep your Heart Appointments:    - RN phone call in 2 weeks    - January appointment with Dr. Schilling and labs    - CORE phone visit end of  Februrary or beginning of March       Please consider attending our virtual support group which is held monthly. Please reach out to Olvin at [phone] for more information if you are interested in attending.     2024 dates:    Monday, January 8th, 1-2pm     Monday, February 5th , 1-2pm     Monday, March 4th , 1-2pm     Monday, April 1st, 1-2pm     Monday, May 6th, 1-2pm     Monday, June 3rd, 1-2pm     Monday, July 1st, 1-2pm     Monday, August 5th, 1-2pm     Monday, September 9th, 1-2pm     Monday, October 7th, 1-2pm     Monday, November 4th, 1-2pm     Monday, December 2nd, 1-2pm

## 2023-12-22 NOTE — LETTER
12/22/2023      RE: Kayli Morillo  [address] [facility] [address]       Dear Colleague,    Thank you for the opportunity to participate in the care of your patient, Kayli Morillo, at the [facility]. Please see a copy of my visit note below.    Virtual Visit Details    Type of service:  Telephone Visit   Phone call duration: 17 minutes   Start time 11:14 AM  End time 11:31 AM    HPI:   Ms. Morillo is a 73 year old female with a past medical history including HFrEF 2/2 NICM, nonobstructive CAD from 3/2023 invasive coronary angiogram, and T2DM who presents for evaluation of chronic systolic heart failure.    She was seen by Dr. Schilling on 10/17/2023. Entresto was increased. She saw EP and was planning to repeat ECHO 3 month after last medication change and then reassess ICD need at that time.     She is feeling so much better than one year. No SOB at rest.  Stable exercise tolerance. She has been doing lots and lots of holiday baking and has been fine with that. No LE edema or abdominal edema. Her orthopnea is resolved. No more PND. She had dizziness for 1-2 days in the afternoon, that was after restarting the entreso. That has resolved. No pre-syncope, or syncope. No palpitations. No chest pain.     Initially was having a bit of GI upset/gas back on the entresto with softer stools. She also was having a runny nose on the the entresto. These symptoms went away when she went off the entresto and now are back although to a lower degree.    BP has been 84//70. The BP that was in the 80s was rechecked rights before and after and both of those systolic's were in the 90s. Hrs are 70s-83    Weights was 125.6 on 12/11 after lots of holiday baking. Weight is now 124.2.    Cardiac Medications  Aldactone 25 mg daily  Metoprolol succinate 25 mg daily  Entresto 12-13 mg BID  Lipitor 20 mg daily  ASA 81 mg daily    PAST MEDICAL  HISTORY:  Past Medical History:   Diagnosis Date     Chronic systolic heart failure (H)      DM2 (diabetes mellitus, type 2) (H)      Gastroesophageal reflux disease without esophagitis      Osteopenia        FAMILY HISTORY:  Family History   Problem Relation Age of Onset     Diabetes Mother      Cerebrovascular Disease Father      Alzheimer Disease Father        SOCIAL HISTORY:  Social History     Socioeconomic History     Marital status:    Tobacco Use     Smoking status: Never     Passive exposure: Past     Smokeless tobacco: Never       CURRENT MEDICATIONS:  aspirin (ASA) 81 MG chewable tablet,   atorvastatin (LIPITOR) 20 MG tablet, Take 1 tablet (20 mg) by mouth daily  Calcium Carb-Cholecalciferol 500-10 MG-MCG TABS,   metFORMIN (GLUCOPHAGE) 500 MG tablet, 2 times daily (with meals)  metoprolol succinate ER (TOPROL XL) 25 MG 24 hr tablet, Take 1 tablet (25 mg) by mouth daily  Multiple Vitamins-Minerals (ICAPS AREDS 2 PO),   sacubitril-valsartan (ENTRESTO) 24-26 MG per tablet, Take 1.5 tablets by mouth 2 times daily  spironolactone (ALDACTONE) 25 MG tablet, Take 1 tablet (25 mg) by mouth daily    No current facility-administered medications on file prior to visit.      ROS:   Refer to HPI    EXAM:  There were no vitals taken for this visit.  N/A video visit    Labs, reviewed with patient in clinic today:  CBC RESULTS:  Lab Results   Component Value Date    WBC 8.5 10/16/2023    RBC 4.31 10/16/2023    HGB 13.6 10/16/2023    HCT 41.0 10/16/2023    MCV 95 10/16/2023    MCH 31.6 10/16/2023    MCHC 33.2 10/16/2023    RDW 12.5 10/16/2023     10/16/2023       CMP RESULTS:  Lab Results   Component Value Date     10/16/2023    POTASSIUM 4.7 10/16/2023    CHLORIDE 104.3 12/18/2023    CO2 26 10/16/2023    ANIONGAP 12 10/16/2023     (H) 10/16/2023     (H) 03/31/2023    BUN 18.4 10/16/2023    CR 1.01 (H) 10/16/2023    GFRESTIMATED 58 (L) 10/16/2023    JULIANNE 10.0 10/16/2023    BILITOTAL 0.2  "10/16/2023    ALBUMIN 4.2 10/16/2023    ALKPHOS 109 (H) 10/16/2023    ALT 9 10/16/2023    AST 17 10/16/2023        INR RESULTS:  Lab Results   Component Value Date    INR 1.01 03/31/2023       No results found for: \"MAG\"  No results found for: \"NTBNPI\"  Lab Results   Component Value Date    NTBNP 511 10/16/2023       Diagnostics:   10/17/23 ECHO  Interpretation Summary  Mild left ventricular dilation is present. Left ventricular function is  decreased. The ejection fraction is 30-35% (moderately reduced). Abnormal  septal motion consistent with left bundle branch block is present. Moderate  diffuse hypokinesis is present.  Global right ventricular function is normal.  Mild to moderate mitral insufficiency is present.  Pulmonary artery systolic pressure is normal.  Estimated mean right atrial pressure is normal.  No pericardial effusion is present.    5/25/23 cMRI  1. The left ventricle is severely dilated. There is increased trabeculations in the left ventricle. There  is severe global hypokinesis. The systolic function severely reduced. The LVEF is traced at 19%.      2. The right ventricle is normal in cavity size. The global systolic function is normal. The RVEF is 51%.      3. The left atrium is severely enlarged. The right atrium is normal in size.      4. There is at least moderate functional mitral regurgitation.     5. There is mild hyperenhancement in the basal septum in the pattern of mid-myocardial stripe consistent  with non ischemic fibrosis.    T1 mapping - ECV 33% (elevated).   T2 mapping - no myocardial edema present.      6. There is trivial pericardial effusion.     7. There is no intracardiac thrombus.     CONCLUSIONS:   Non-ischemic cardiomyopathy with severe LV dysfunction and normal right ventricular function, LVEF of 19%  and RVEF 51%.  No evidence of myocardial edema.      CORE EXAM    3/16/23 ECHO  Interpretation Summary  Moderate left ventricular dilation (LVIDd 6.1cm). Severe diffuse " hypokinesis.  Biplane LVEF is 14.3%.  The right ventricle is normal size and function.  Moderate mitral insufficiency is present due to dilated left ventricle.  Moderate pulmonary hypertension. Right ventricular systolic pressure is 52  mmHg.  The inferior vena cava was normal in size with preserved respiratory  variability.     There is no prior study for direct comparison. Dr. Quarles informed of the  Findings.    3/31/23 Coronary angiogram and RHC  Diagnostic  Dominance: Right  Left Main   The vessel was visualized by selective angiography, is moderate in size and is angiographically normal.      Left Anterior Descending   The vessel is moderate in size.   Prox LAD lesion is 20% stenosed. The lesion is focal.   Prox LAD to Mid LAD lesion is 25% stenosed with 65% stenosed side branch in 1st Diag.      First Diagonal Branch   The vessel is moderate in size.      First Septal Branch   The vessel is small and is angiographically normal.      Second Diagonal Branch   The vessel is small.      Second Septal Branch   The vessel is small and is angiographically normal.      Third Diagonal Branch   The vessel is small and is angiographically normal.      Left Circumflex   The vessel is moderate in size and is angiographically normal.      First Obtuse Marginal Branch   The vessel is small and is angiographically normal.      Second Obtuse Marginal Branch   The vessel is moderate in size.   2nd Mrg lesion is 40% stenosed.      Lateral Second Obtuse Marginal Branch   The vessel is moderate in size and is angiographically normal.      Third Obtuse Marginal Branch   The vessel is moderate in size and is angiographically normal.      Fourth Obtuse Marginal Branch   The vessel is small and is angiographically normal.      Right Coronary Artery   The vessel was visualized by selective angiography and is moderate in size.   Mid RCA lesion is 20% stenosed.      Acute Marginal Branch   The vessel is moderate in size and is  angiographically normal.      Right Ventricular Branch   The vessel is small and is angiographically normal.      Right Posterior Descending Artery   The vessel is moderate in size and is angiographically normal.      Right Posterior Atrioventricular Artery   The vessel is small and is angiographically normal.      NIBP 117/56/78  RA --/--/3  RV 42/3  PA 42/14/24  PCWP --/--/10  PA Sat 62%  West CO/CI 3.5/2.2  TD CO/CI 3.6/2.3  SVR 1444 (TD) dynes  PVR 3.6 (TD) AMOR     Right sided filling pressures are normal. Left sided filling pressures are normal. Mild elevated pulmonary hypertension. Normal cardiac output level. Hemodynamic data has been modified in Epic per physician review.      Assessment and Plan:   Ms. Morillo is a 73 year old female with a past medical history including HFrEF 2/2 NICM, nonobstructive CAD from 3/2023 invasive coronary angiogram, and T2DM who presents for evaluation of chronic systolic heart failure.    She has been referred for ICD- they are going to monitor and revisit the subject after her next echo. She is euvolemic by history. She is doing well back on the entresto- some of her GI symptoms have come back but more mild since she is on the lower dose. She finds these toelrable and is willing to titrate this in the future as well. We will take it slow. Today we will increase her metoprolol. Plan to increase entresto to 24-26 mg BID in 2 weeks if she is iman jean.    # Chronic systolic heart failure/HFrEF secondary to NICM. EF bertha of 15-20%, improved to 30-35% as of 10/2023  Stage C. NYHA Class III.    Fluid status: euvolemic, off loop diuretics   ACEi/ARB/ARNI: Entresto 12-13 mg BID, plan to increase to 24-26 mg BID if BPS tolerate in 2 weeks  BB: increase metoprolol succinate to 37.5 mg daily  Aldosterone: aldactone 25 mg daily   SGLT2i- cost prohibitive- could consider applying for patient assistance in the future  SCD prophylaxis: referred to EP for consideration of ICD by Dr. Schilling,  planning to reassess after next echo. Not meeting criteria for secondary prevention with her NSVT per EP.  NSAID use: contraindicated  Sleep apnea evaluation: referred to sleep clinic (outside referall)  Remote monitoring: Would consider in the future if more volume concerns, currently I don't think this is needed    # T2DM  - Will need to communicate with her PCP if we are able to add an SGLT2i (she is on metformin). Currently cost prohibitive.    # Non-obstructive coronary artery disease as per 2023 angiogram  - Continue ASA and lipitor    # CKD stage 2  - Cr 0.9, stable with recent b/l      Follow up   - RN phone call in 2 weeks, plan to increase entresto as above  - Dr Schilling in Jan as scheduled  - CORE 4-6 weeks after Dr. Schilling appointment    Billing  - I spent 17 minutes via telephone with this patient      Eden Pugh PA-C  [facility]          CC  JOSHUA, ANA PERKINS      Please do not hesitate to contact me if you have any questions/concerns.     Sincerely,     Eden Pugh PA-C

## 2023-12-22 NOTE — PROGRESS NOTES
Virtual Visit Details    Type of service:  Telephone Visit   Phone call duration: 17 minutes   Start time 11:14 AM  End time 11:31 AM    HPI:   Ms. Morillo is a 73 year old female with a past medical history including HFrEF 2/2 NICM, nonobstructive CAD from 3/2023 invasive coronary angiogram, and T2DM who presents for evaluation of chronic systolic heart failure.    She was seen by Dr. Schilling on 10/17/2023. Entresto was increased. She saw EP and was planning to repeat ECHO 3 month after last medication change and then reassess ICD need at that time.     She is feeling so much better than one year. No SOB at rest.  Stable exercise tolerance. She has been doing lots and lots of holiday baking and has been fine with that. No LE edema or abdominal edema. Her orthopnea is resolved. No more PND. She had dizziness for 1-2 days in the afternoon, that was after restarting the entreso. That has resolved. No pre-syncope, or syncope. No palpitations. No chest pain.     Initially was having a bit of GI upset/gas back on the entresto with softer stools. She also was having a runny nose on the the entresto. These symptoms went away when she went off the entresto and now are back although to a lower degree.    BP has been 84//70. The BP that was in the 80s was rechecked rights before and after and both of those systolic's were in the 90s. Hrs are 70s-83    Weights was 125.6 on 12/11 after lots of holiday baking. Weight is now 124.2.    Cardiac Medications  Aldactone 25 mg daily  Metoprolol succinate 25 mg daily  Entresto 12-13 mg BID  Lipitor 20 mg daily  ASA 81 mg daily    PAST MEDICAL HISTORY:  Past Medical History:   Diagnosis Date    Chronic systolic heart failure (H)     DM2 (diabetes mellitus, type 2) (H)     Gastroesophageal reflux disease without esophagitis     Osteopenia        FAMILY HISTORY:  Family History   Problem Relation Age of Onset    Diabetes Mother     Cerebrovascular Disease Father     Alzheimer Disease  "Father        SOCIAL HISTORY:  Social History     Socioeconomic History    Marital status:    Tobacco Use    Smoking status: Never     Passive exposure: Past    Smokeless tobacco: Never       CURRENT MEDICATIONS:  aspirin (ASA) 81 MG chewable tablet,   atorvastatin (LIPITOR) 20 MG tablet, Take 1 tablet (20 mg) by mouth daily  Calcium Carb-Cholecalciferol 500-10 MG-MCG TABS,   metFORMIN (GLUCOPHAGE) 500 MG tablet, 2 times daily (with meals)  metoprolol succinate ER (TOPROL XL) 25 MG 24 hr tablet, Take 1 tablet (25 mg) by mouth daily  Multiple Vitamins-Minerals (ICAPS AREDS 2 PO),   sacubitril-valsartan (ENTRESTO) 24-26 MG per tablet, Take 1.5 tablets by mouth 2 times daily  spironolactone (ALDACTONE) 25 MG tablet, Take 1 tablet (25 mg) by mouth daily    No current facility-administered medications on file prior to visit.      ROS:   Refer to HPI    EXAM:  There were no vitals taken for this visit.  N/A video visit    Labs, reviewed with patient in clinic today:  CBC RESULTS:  Lab Results   Component Value Date    WBC 8.5 10/16/2023    RBC 4.31 10/16/2023    HGB 13.6 10/16/2023    HCT 41.0 10/16/2023    MCV 95 10/16/2023    MCH 31.6 10/16/2023    MCHC 33.2 10/16/2023    RDW 12.5 10/16/2023     10/16/2023       CMP RESULTS:  Lab Results   Component Value Date     10/16/2023    POTASSIUM 4.7 10/16/2023    CHLORIDE 104.3 12/18/2023    CO2 26 10/16/2023    ANIONGAP 12 10/16/2023     (H) 10/16/2023     (H) 03/31/2023    BUN 18.4 10/16/2023    CR 1.01 (H) 10/16/2023    GFRESTIMATED 58 (L) 10/16/2023    JULIANNE 10.0 10/16/2023    BILITOTAL 0.2 10/16/2023    ALBUMIN 4.2 10/16/2023    ALKPHOS 109 (H) 10/16/2023    ALT 9 10/16/2023    AST 17 10/16/2023        INR RESULTS:  Lab Results   Component Value Date    INR 1.01 03/31/2023       No results found for: \"MAG\"  No results found for: \"NTBNPI\"  Lab Results   Component Value Date    NTBNP 511 10/16/2023       Diagnostics:   10/17/23 " ECHO  Interpretation Summary  Mild left ventricular dilation is present. Left ventricular function is  decreased. The ejection fraction is 30-35% (moderately reduced). Abnormal  septal motion consistent with left bundle branch block is present. Moderate  diffuse hypokinesis is present.  Global right ventricular function is normal.  Mild to moderate mitral insufficiency is present.  Pulmonary artery systolic pressure is normal.  Estimated mean right atrial pressure is normal.  No pericardial effusion is present.    5/25/23 cMRI  1. The left ventricle is severely dilated. There is increased trabeculations in the left ventricle. There  is severe global hypokinesis. The systolic function severely reduced. The LVEF is traced at 19%.      2. The right ventricle is normal in cavity size. The global systolic function is normal. The RVEF is 51%.      3. The left atrium is severely enlarged. The right atrium is normal in size.      4. There is at least moderate functional mitral regurgitation.     5. There is mild hyperenhancement in the basal septum in the pattern of mid-myocardial stripe consistent  with non ischemic fibrosis.    T1 mapping - ECV 33% (elevated).   T2 mapping - no myocardial edema present.      6. There is trivial pericardial effusion.     7. There is no intracardiac thrombus.     CONCLUSIONS:   Non-ischemic cardiomyopathy with severe LV dysfunction and normal right ventricular function, LVEF of 19%  and RVEF 51%.  No evidence of myocardial edema.      CORE EXAM    3/16/23 ECHO  Interpretation Summary  Moderate left ventricular dilation (LVIDd 6.1cm). Severe diffuse hypokinesis.  Biplane LVEF is 14.3%.  The right ventricle is normal size and function.  Moderate mitral insufficiency is present due to dilated left ventricle.  Moderate pulmonary hypertension. Right ventricular systolic pressure is 52  mmHg.  The inferior vena cava was normal in size with preserved respiratory  variability.     There is no prior  study for direct comparison. Dr. Quarles informed of the  Findings.    3/31/23 Coronary angiogram and [facility]  Diagnostic  Dominance: Right  Left Main   The vessel was visualized by selective angiography, is moderate in size and is angiographically normal.      Left Anterior Descending   The vessel is moderate in size.   Prox LAD lesion is 20% stenosed. The lesion is focal.   Prox LAD to Mid LAD lesion is 25% stenosed with 65% stenosed side branch in 1st Diag.      First Diagonal Branch   The vessel is moderate in size.      First Septal Branch   The vessel is small and is angiographically normal.      Second Diagonal Branch   The vessel is small.      Second Septal Branch   The vessel is small and is angiographically normal.      Third Diagonal Branch   The vessel is small and is angiographically normal.      Left Circumflex   The vessel is moderate in size and is angiographically normal.      First Obtuse Marginal Branch   The vessel is small and is angiographically normal.      Second Obtuse Marginal Branch   The vessel is moderate in size.   2nd Mrg lesion is 40% stenosed.      Lateral Second Obtuse Marginal Branch   The vessel is moderate in size and is angiographically normal.      Third Obtuse Marginal Branch   The vessel is moderate in size and is angiographically normal.      Fourth Obtuse Marginal Branch   The vessel is small and is angiographically normal.      Right Coronary Artery   The vessel was visualized by selective angiography and is moderate in size.   Mid RCA lesion is 20% stenosed.      Acute Marginal Branch   The vessel is moderate in size and is angiographically normal.      Right Ventricular Branch   The vessel is small and is angiographically normal.      Right Posterior Descending Artery   The vessel is moderate in size and is angiographically normal.      Right Posterior Atrioventricular Artery   The vessel is small and is angiographically normal.      NIBP 117/56/78  RA --/--/3  RV 42/3  PA  42/14/24  PCWP --/--/10  PA Sat 62%  West CO/CI 3.5/2.2  TD CO/CI 3.6/2.3  SVR 1444 (TD) dynes  PVR 3.6 (TD) AMOR     Right sided filling pressures are normal. Left sided filling pressures are normal. Mild elevated pulmonary hypertension. Normal cardiac output level. Hemodynamic data has been modified in Epic per physician review.      Assessment and Plan:   Ms. Morillo is a 73 year old female with a past medical history including HFrEF 2/2 NICM, nonobstructive CAD from 3/2023 invasive coronary angiogram, and T2DM who presents for evaluation of chronic systolic heart failure.    She has been referred for ICD- they are going to monitor and revisit the subject after her next echo. She is euvolemic by history. She is doing well back on the entresto- some of her GI symptoms have come back but more mild since she is on the lower dose. She finds these toelrable and is willing to titrate this in the future as well. We will take it slow. Today we will increase her metoprolol. Plan to increase entresto to 24-26 mg BID in 2 weeks if she is iman jean.    # Chronic systolic heart failure/HFrEF secondary to NICM. EF bertha of 15-20%, improved to 30-35% as of 10/2023  Stage C. NYHA Class III.    Fluid status: euvolemic, off loop diuretics   ACEi/ARB/ARNI: Entresto 12-13 mg BID, plan to increase to 24-26 mg BID if BPS tolerate in 2 weeks  BB: increase metoprolol succinate to 37.5 mg daily  Aldosterone: aldactone 25 mg daily   SGLT2i- cost prohibitive- could consider applying for patient assistance in the future  SCD prophylaxis: referred to EP for consideration of ICD by Dr. Schilling, planning to reassess after next echo. Not meeting criteria for secondary prevention with her NSVT per EP.  NSAID use: contraindicated  Sleep apnea evaluation: referred to sleep clinic (outside referall)  Remote monitoring: Would consider in the future if more volume concerns, currently I don't think this is needed    # T2DM  - Will need to communicate  with her PCP if we are able to add an SGLT2i (she is on metformin). Currently cost prohibitive.    # Non-obstructive coronary artery disease as per 2023 angiogram  - Continue ASA and lipitor    # CKD stage 2  - Cr 0.9, stable with recent b/l      Follow up   - RN phone call in 2 weeks, plan to increase entresto as above  - Dr Schilling in Jan as scheduled  - CORE 4-6 weeks after Dr. Schilling appointment    Billing  - I spent 17 minutes via telephone with this patient      Eden Pugh PA-C  [facility] Cardiology          CC  ANA LEWIS

## 2023-12-22 NOTE — NURSING NOTE
Additional vitals from today per pt:  107/53 HR: 68  111/56 HR: 67    Is the patient currently in the state of [place]? YES    Visit mode:TELEPHONE    If the visit is dropped, the patient can be reconnected by: VIDEO VISIT: Text to cell phone:   Telephone Information:   Mobile [phone]       Will anyone else be joining the visit? NO  (If patient encounters technical issues they should call [phone]    How would you like to obtain your AVS? MyChart    Are changes needed to the allergy or medication list? No    Patient declined individual allergy and medication review by support staff because patient denies any changes since echeck-in completion and states all information entered during echeck-in remains accurate.    Reason for visit: DMITRIY Ivy MA VVF

## 2024-01-05 ENCOUNTER — PATIENT OUTREACH (OUTPATIENT)
Dept: CARDIOLOGY | Facility: CLINIC | Age: 74
End: 2024-01-05
Payer: MEDICARE

## 2024-01-05 DIAGNOSIS — I50.20 HEART FAILURE WITH REDUCED EJECTION FRACTION, NYHA CLASS III (H): Primary | ICD-10-CM

## 2024-01-05 DIAGNOSIS — R06.02 SOB (SHORTNESS OF BREATH): ICD-10-CM

## 2024-01-05 RX ORDER — SACUBITRIL AND VALSARTAN 24; 26 MG/1; MG/1
1 TABLET, FILM COATED ORAL 2 TIMES DAILY
Qty: 180 TABLET | Refills: 3 | Status: SHIPPED | OUTPATIENT
Start: 2024-01-05 | End: 2024-01-23

## 2024-01-05 NOTE — TELEPHONE ENCOUNTER
I called Kayli with recommendations. She will increase the Entresto as recommended and will have labs done at [facility] ~1/19. She will call us back if the bloating does not improve.    Date: 1/5/2024    Time of Call: 2:23 PM     Diagnosis:  heart failure     [ TORB ] Ordering provider: Justina RAHMAN  Order: increase Entresto to 24-26 mg BID, BMP in 2 weeks     Order received by: Linn Hill RN     Follow-up/additional notes: BMP order faxed to [facility]

## 2024-01-05 NOTE — PROGRESS NOTES
Called patient to check in. Seen in CORE 2 weeks ago and metoprolol increased. Plan was for RN call in 2 weeks and if feeling well try to increase Entresto.     She has been good. BPs are 10 points higher than before. Has been around 123 - 125 systolic. No dizziness or lightheadedness. Weight - she notes her appetite has increased. Weight is up a bit to 126 lbs. Had been around 123-124. Liked it better lower. No increased swelling to legs, but her belly seems pizano.    is in hospital with influenza. She's got to walk a ways in hospital to see him and that is laborous for her breathing but then she sits and rests and is fine.     Will review with provider.

## 2024-01-22 ENCOUNTER — PATIENT OUTREACH (OUTPATIENT)
Dept: CARDIOLOGY | Facility: CLINIC | Age: 74
End: 2024-01-22
Payer: MEDICARE

## 2024-01-22 NOTE — PROGRESS NOTES
Called Kayli to follow up. 2 weeks ago increased Entresto to 24/26 mg BID and recommended labs and RN call in 2 weeks.   Kayli reports she's feeling good. She doesn't know that she has any effect. She feels gassy again. Thinks this is maybe worse since she made the increase 2 weeks ago.   Weight most recent is 127. Was 126 two weeks ago when we made the increase which was up a couple pounds for her.   Notes that her  was in the hospital recently for over a week with influenza.   She has no HF symptoms, no worsening sob or swelling in legs.   Discussed going to get labs and then realized she sees Dr. Schilling tomorrow. She will get labs and see him for further follow up.

## 2024-01-23 ENCOUNTER — LAB (OUTPATIENT)
Dept: LAB | Facility: CLINIC | Age: 74
End: 2024-01-23
Attending: INTERNAL MEDICINE
Payer: MEDICARE

## 2024-01-23 ENCOUNTER — OFFICE VISIT (OUTPATIENT)
Dept: CARDIOLOGY | Facility: CLINIC | Age: 74
End: 2024-01-23
Attending: INTERNAL MEDICINE
Payer: MEDICARE

## 2024-01-23 VITALS
HEIGHT: 59 IN | WEIGHT: 128.9 LBS | SYSTOLIC BLOOD PRESSURE: 122 MMHG | DIASTOLIC BLOOD PRESSURE: 67 MMHG | OXYGEN SATURATION: 97 % | BODY MASS INDEX: 25.99 KG/M2 | HEART RATE: 72 BPM

## 2024-01-23 DIAGNOSIS — E78.2 MIXED HYPERLIPIDEMIA: ICD-10-CM

## 2024-01-23 DIAGNOSIS — I42.8 NONISCHEMIC CARDIOMYOPATHY (H): ICD-10-CM

## 2024-01-23 DIAGNOSIS — R06.02 SOB (SHORTNESS OF BREATH): ICD-10-CM

## 2024-01-23 DIAGNOSIS — I50.20 HEART FAILURE WITH REDUCED EJECTION FRACTION, NYHA CLASS III (H): ICD-10-CM

## 2024-01-23 DIAGNOSIS — I10 BENIGN ESSENTIAL HYPERTENSION: ICD-10-CM

## 2024-01-23 DIAGNOSIS — I47.29 PAROXYSMAL VENTRICULAR TACHYCARDIA (H): ICD-10-CM

## 2024-01-23 DIAGNOSIS — I49.3 PVC'S (PREMATURE VENTRICULAR CONTRACTIONS): ICD-10-CM

## 2024-01-23 LAB
ALBUMIN SERPL BCG-MCNC: 4.1 G/DL (ref 3.5–5.2)
ALP SERPL-CCNC: 77 U/L (ref 40–150)
ALT SERPL W P-5'-P-CCNC: 18 U/L (ref 0–50)
ANION GAP SERPL CALCULATED.3IONS-SCNC: 8 MMOL/L (ref 7–15)
AST SERPL W P-5'-P-CCNC: 18 U/L (ref 0–45)
BILIRUB SERPL-MCNC: 0.6 MG/DL
BUN SERPL-MCNC: 25.1 MG/DL (ref 8–23)
CALCIUM SERPL-MCNC: 9.6 MG/DL (ref 8.8–10.2)
CHLORIDE SERPL-SCNC: 104 MMOL/L (ref 98–107)
CREAT SERPL-MCNC: 0.94 MG/DL (ref 0.51–0.95)
DEPRECATED HCO3 PLAS-SCNC: 29 MMOL/L (ref 22–29)
EGFRCR SERPLBLD CKD-EPI 2021: 63 ML/MIN/1.73M2
ERYTHROCYTE [DISTWIDTH] IN BLOOD BY AUTOMATED COUNT: 12.6 % (ref 10–15)
FERRITIN SERPL-MCNC: 72 NG/ML (ref 11–328)
GLUCOSE SERPL-MCNC: 153 MG/DL (ref 70–99)
HCT VFR BLD AUTO: 41.4 % (ref 35–47)
HGB BLD-MCNC: 13.4 G/DL (ref 11.7–15.7)
IRON BINDING CAPACITY (ROCHE): 300 UG/DL (ref 240–430)
IRON SATN MFR SERPL: 36 % (ref 15–46)
IRON SERPL-MCNC: 107 UG/DL (ref 37–145)
LVEF ECHO: NORMAL
MCH RBC QN AUTO: 31.2 PG (ref 26.5–33)
MCHC RBC AUTO-ENTMCNC: 32.4 G/DL (ref 31.5–36.5)
MCV RBC AUTO: 97 FL (ref 78–100)
NT-PROBNP SERPL-MCNC: 549 PG/ML (ref 0–900)
PLATELET # BLD AUTO: 251 10E3/UL (ref 150–450)
POTASSIUM SERPL-SCNC: 4.1 MMOL/L (ref 3.4–5.3)
PROT SERPL-MCNC: 6.8 G/DL (ref 6.4–8.3)
RBC # BLD AUTO: 4.29 10E6/UL (ref 3.8–5.2)
SODIUM SERPL-SCNC: 141 MMOL/L (ref 135–145)
TRANSFERRIN SERPL-MCNC: 262 MG/DL (ref 200–360)
WBC # BLD AUTO: 7.6 10E3/UL (ref 4–11)

## 2024-01-23 PROCEDURE — 99215 OFFICE O/P EST HI 40 MIN: CPT | Mod: 25 | Performed by: INTERNAL MEDICINE

## 2024-01-23 PROCEDURE — 82728 ASSAY OF FERRITIN: CPT | Performed by: PATHOLOGY

## 2024-01-23 PROCEDURE — 36415 COLL VENOUS BLD VENIPUNCTURE: CPT | Performed by: PATHOLOGY

## 2024-01-23 PROCEDURE — 83550 IRON BINDING TEST: CPT | Performed by: PATHOLOGY

## 2024-01-23 PROCEDURE — 83540 ASSAY OF IRON: CPT | Performed by: PATHOLOGY

## 2024-01-23 PROCEDURE — G0463 HOSPITAL OUTPT CLINIC VISIT: HCPCS | Performed by: INTERNAL MEDICINE

## 2024-01-23 PROCEDURE — 80053 COMPREHEN METABOLIC PANEL: CPT | Performed by: PATHOLOGY

## 2024-01-23 PROCEDURE — 85027 COMPLETE CBC AUTOMATED: CPT | Performed by: PATHOLOGY

## 2024-01-23 PROCEDURE — 99207 PR STATISTIC IV PUSH SINGLE INITIAL SUBSTANCE: CPT | Performed by: STUDENT IN AN ORGANIZED HEALTH CARE EDUCATION/TRAINING PROGRAM

## 2024-01-23 PROCEDURE — 99000 SPECIMEN HANDLING OFFICE-LAB: CPT | Performed by: PATHOLOGY

## 2024-01-23 PROCEDURE — 93306 TTE W/DOPPLER COMPLETE: CPT | Performed by: INTERNAL MEDICINE

## 2024-01-23 PROCEDURE — 83880 ASSAY OF NATRIURETIC PEPTIDE: CPT | Performed by: PATHOLOGY

## 2024-01-23 PROCEDURE — 84466 ASSAY OF TRANSFERRIN: CPT | Performed by: STUDENT IN AN ORGANIZED HEALTH CARE EDUCATION/TRAINING PROGRAM

## 2024-01-23 RX ORDER — SACUBITRIL AND VALSARTAN 24; 26 MG/1; MG/1
TABLET, FILM COATED ORAL
Qty: 270 TABLET | Refills: 3 | Status: SHIPPED | OUTPATIENT
Start: 2024-01-23 | End: 2024-02-06 | Stop reason: DRUGHIGH

## 2024-01-23 RX ADMIN — Medication 6 ML: at 08:10

## 2024-01-23 ASSESSMENT — PAIN SCALES - GENERAL: PAINLEVEL: NO PAIN (0)

## 2024-01-23 NOTE — PATIENT INSTRUCTIONS
Dr. Schilling recommends:    Increase Metoprolol Succinate to 37.5 MG once daily.    Increase Entresto to 24-26 MG tablets, take one every AM and two every PM. If you are able to tolerate the increase, after two weeks, increase Entresto to 24-26 MG tablets, take two every am and tow every pm.    Schedule a follow up with Electrophysiology.    Follow up clinic visit with Dr. Schilling in 6 months.      Thank you for your visit today.  Please call me with any questions or concerns.   Bhavesh Herrera RN  Cardiology Care Coordinator  [phone]

## 2024-01-23 NOTE — LETTER
"1/23/2024      RE: Kayli Morillo  [address] [facility] [address]       Dear Colleague,    Thank you for the opportunity to participate in the care of your patient, Kayli Morillo, at the [facility] [place] at [facility]. Please see a copy of my visit note below.    Cardiology Clinic Note    January 23, 2024    HPI  Kayli Morillo  is a 73 year old year old female with a past medical history significant for HFrEF 2/2 NICM, nonobstructive CAD from 3/2023 on coronary angiogram, and T2DM who presents for re-evaluation of chronic systolic heart failure. Kayli initially presented to an OSH earlier in 2023. She was dyspneic, had a \"raspiness\" in her throat and mild lower extremity edema. A brief w/u revealed a pleural effusion and decreased cardiac function by bedside ultrasound. She was then seen in cardiology clinic with an echocardiogram. This echo revealed decreased LVEF of 14%. A subsequent coronary angiogram revealed nonobstructive CAD, and a right heart catheterization revealed normal filling pressures and borderline normal cardiac output/index. Given the lack of ischemic etiology, a cardiac MRI was ordered. After I saw her she has also been followed by [facility].  Other than some recent stomach flu she has been doing well with medications.  Temporarily spironolactone was held and lisinopril was reduced to once a day dosing however was able to increase it back up to 5 mg twice daily dosing and now 5 mg in the morning and 7-1/2 mg in the afternoon.  She did have some VT episodes on Zio and were associated with some symptoms. Referred her to EP for ICD considerations; medical therapy for 3 months was appropriately suggested and follow up afterwards for potential implant if LVEF remains low. NSVT burden was not high enough to warrant ICD.     We last saw her in our office on 10/2023 at which time we had recommended increasing entresto however " in the interm she had ran out of entresto and noticed some symptom improvement regarding GI symptoms that she had related to entresto. Now she has been restarted on a lower dose of entresto. She saw EP recently as well who had wanted an echo 3 months from last medication change to assess for ICD need.     Since we last saw her she has been doing well and has been able to be more active than usual. Her blood pressure ranges from 100-120/50-60. She can walk 2 blocks without getting short of breath. Her appetite has been good. Denies any chest pain. Her weight did go up slightly because of the holiday's and eating more from 124lbs to 128 over 2 months.  Otherwise she has been doing very well denies any new complaints.  Confirmed that she did have some potential GI concerns when increasing Entresto but tolerates the current dose well.  No other issues    PAST MEDICAL HISTORY:  Past Medical History:   Diagnosis Date    Chronic systolic heart failure (H)     DM2 (diabetes mellitus, type 2) (H)     Gastroesophageal reflux disease without esophagitis     Osteopenia      CURRENT MEDICATIONS:  Current Outpatient Medications   Medication Sig Dispense Refill    aspirin (ASA) 81 MG chewable tablet       atorvastatin (LIPITOR) 20 MG tablet Take 1 tablet (20 mg) by mouth daily 90 tablet 3    Calcium Carb-Cholecalciferol 500-10 MG-MCG TABS       metFORMIN (GLUCOPHAGE) 500 MG tablet 2 times daily (with meals)      metoprolol succinate ER (TOPROL XL) 25 MG 24 hr tablet Take 1.5 tablets (37.5 mg) by mouth daily 135 tablet 1    Multiple Vitamins-Minerals (ICAPS AREDS 2 PO)       sacubitril-valsartan (ENTRESTO) 24-26 MG per tablet Take 1 tablet by mouth 2 times daily 180 tablet 3    spironolactone (ALDACTONE) 25 MG tablet Take 1 tablet (25 mg) by mouth daily 180 tablet 3     FAMILY HISTORY:  Family History   Problem Relation Age of Onset    Diabetes Mother     Cerebrovascular Disease Father     Alzheimer Disease Father      SOCIAL  "HISTORY:  Social History     Socioeconomic History    Marital status:    Tobacco Use    Smoking status: Never     Passive exposure: Past    Smokeless tobacco: Never     ROS:   10 point ROS negative except HPI    EXAM:  /67 (BP Location: Right arm, Patient Position: Chair, Cuff Size: Adult Regular)   Pulse 72   Ht 1.5 m (4' 11.06\")   Wt 58.5 kg (128 lb 14.4 oz)   SpO2 97%   BMI 25.99 kg/m    General: appears comfortable, no distress   Head: normocephalic, atraumatic  Eyes: anicteric sclera, EOMI  Neck: no adenopathy  Orophyarynx: clear and moist   Heart:  normal rate, regular rhythm, normal S1/S2, no murmur, no S3 or S4, estimated JVP 8 cm H2O  Lungs: clear, no rales or wheezing  Abdomen: soft, non-tender, non-distended, no hepatosplenomegaly  Extremities: no clubbing, cyanosis.  Only ttrace lower extremity edema.   Neurological: normal speech and affect, no gross motor deficits    Labs:  CBC RESULTS:  Lab Results   Component Value Date    WBC 8.5 10/16/2023    RBC 4.31 10/16/2023    HGB 13.6 10/16/2023    HCT 41.0 10/16/2023    MCV 95 10/16/2023    MCH 31.6 10/16/2023    MCHC 33.2 10/16/2023    RDW 12.5 10/16/2023     10/16/2023     CMP RESULTS:  Lab Results   Component Value Date     10/16/2023    POTASSIUM 4.7 10/16/2023    CHLORIDE 104.3 12/18/2023    CO2 26 10/16/2023    ANIONGAP 12 10/16/2023     (H) 10/16/2023     (H) 03/31/2023    BUN 18.4 10/16/2023    CR 1.01 (H) 10/16/2023    GFRESTIMATED 58 (L) 10/16/2023    JULIANNE 10.0 10/16/2023    BILITOTAL 0.2 10/16/2023    ALBUMIN 4.2 10/16/2023    ALKPHOS 109 (H) 10/16/2023    ALT 9 10/16/2023    AST 17 10/16/2023     Lab Results   Component Value Date    TRIG 86 06/08/2023     Coronary angiogram 3/31/2023:  Mild to moderate non obstructive CAD.  Non ischemic cardiomyopathy.  NIBP 117/56/78  RA --/--/3  RV 42/3  PA 42/14/24  PCWP --/--/10  PA Sat 62%  West CO/CI 3.5/2.2  TD CO/CI 3.6/2.3  SVR 1444 (TD) dynes  PVR 3.6 (TD) " AMOR    Echocardiogram 3/16/23  Moderate left ventricular dilation (LVIDd 6.1cm). Severe diffuse hypokinesis.  Biplane LVEF is 14.3%.  The right ventricle is normal size and function.  Moderate mitral insufficiency is present due to dilated left ventricle.  Moderate pulmonary hypertension. Right ventricular systolic pressure is 52mmHg.  The inferior vena cava was normal in size with preserved respiratory variability.     Zio 4/2023:  79 runs of VT up to 19 beats were noted in addition a few episodes of SVT.     CMRI 5/2023:  1. The left ventricle is severely dilated. There is increased trabeculations in the left ventricle. There is severe global hypokinesis. The systolic function severely reduced. The LVEF is traced at 19%.   2. The right ventricle is normal in cavity size. The global systolic function is normal. The RVEF is 51%.   3. The left atrium is severely enlarged. The right atrium is normal in size.   4. There is at least moderate functional mitral regurgitation.  5. There is mild hyperenhancement in the basal septum in the pattern of mid-myocardial stripe consistent with non ischemic fibrosis.    T1 mapping - ECV 33% (elevated).   T2 mapping - no myocardial edema present.   6. There is trivial pericardial effusion.  7. There is no intracardiac thrombus.  CONCLUSIONS:   Non-ischemic cardiomyopathy with severe LV dysfunction and normal right ventricular function, LVEF of 19% and RVEF 51%. No evidence of myocardial edema.      TTE 10/17/23:  Mild left ventricular dilation is present. Left ventricular function is decreased. The ejection fraction is 30-35% (moderately reduced). Abnormal septal motion consistent with left bundle branch block is present. Moderate diffuse hypokinesis is present.  Global right ventricular function is normal. Mild to moderate mitral insufficiency is present. Pulmonary artery systolic pressure is normal. Estimated mean right atrial pressure is normal.   No pericardial effusion is  present.    Assessment and Plan:   72-year-old lady with above past medical history including nonischemic cardiomyopathy severely reduced ejection fraction 19% LVEF normal at left ventricular function with episodes of symptomatic VT who is here for follow-up.  Overall doing very well without any issues.  Able to increase the medications and tolerates the increased dose of Entresto.  We are going to continue medication titration and increase Entresto to 37 and half milligram twice daily dosing and if tolerates this then in a week or 10 days we will increase it further to 49 mg twice daily dosing.  Echo done today and reviewed.  Left ventricular ejection fraction is around 25 to 30% on direct comparison to prior studies probably there is minimal change.  Unfortunately she did not quite respond to medical therapy as expected and the start given the low ejection fraction I think we should proceed with ICD implantation.  We will refer her back to EP at this time.  Along the same lines we are continuing to titrate medications as able.  We discussed to increase metoprolol XL to 37 mg daily dosing as tolerated.  In addition we should also try to increase the Entresto, increase the evening dose first to double that is 49 mg and keep the morning dose at 24 mg.  Once he tolerates this well in a couple of weeks we can try to increase it to 49 mg twice daily.  No other medication changes today, we will see her back in 6 months or so or sooner if needed.       # Chronic systolic heart failure/HFrEF secondary to NICM. EF bertha of 15-20%, improved to 30%   Stage C. NYHA Class III.  Fluid status: euvolemic, off loop diuretics   ACEi/ARB/ARNI: Entresto 24-26 mg BID, will increase  to 24-26 in AM and 48-52mg in PM.  BB: increase metoprolol succinate to 37.5 mg daily (was previously instructed to increase last visit but did not increase then)  Aldosterone: aldactone 25 mg daily   SGLT2i- cost prohibitive- could consider applying for  patient assistance in the future  SCD prophylaxis: referred to EP for consideration of ICD   NSAID use: contraindicated  Sleep apnea evaluation: referred to sleep clinic (outside referall)  Remote monitoring: Would consider in the future if more volume concerns, currently I don't think this is needed    Bennett Tabor MD  Cardiology Fellow  Plan Discussed with Attending Physician, Dr. Shakir Irvin MD    I have seen and evaluated the patient and agree with the assessment and plan as documented above.  Total time spent was 45 minutes including reviewing the medical history, recent echocardiogram from today and comparing to prior studies, face-to-face time with patient and her daughter and documenting in the chart.  Albaro Schilling MD

## 2024-01-23 NOTE — NURSING NOTE
Chief Complaint   Patient presents with    Follow Up     Dr. Schilling: Chronic systolic heart failure.       Vitals were taken, medications reconciled.    Litzy Patiño, Facilitator   8:59 AM

## 2024-01-23 NOTE — PROGRESS NOTES
Systolic blood pressure 100-120/50-60    Can walk 14 steps without getting sob. 2blocks without   No lower extremity edema,  Apeptite is poor.  No chest pain  124 to 128 over 2 months.    Has been able to do more actviity.      Increase metoprolol 37.5mg

## 2024-01-23 NOTE — PROGRESS NOTES
"Cardiology Clinic Note    January 23, 2024    HPI  Kayli Morillo  is a 73 year old year old female with a past medical history significant for HFrEF 2/2 NICM, nonobstructive CAD from 3/2023 on coronary angiogram, and T2DM who presents for re-evaluation of chronic systolic heart failure. Kayli initially presented to an OSH earlier in 2023. She was dyspneic, had a \"raspiness\" in her throat and mild lower extremity edema. A brief w/u revealed a pleural effusion and decreased cardiac function by bedside ultrasound. She was then seen in cardiology clinic with an echocardiogram. This echo revealed decreased LVEF of 14%. A subsequent coronary angiogram revealed nonobstructive CAD, and a right heart catheterization revealed normal filling pressures and borderline normal cardiac output/index. Given the lack of ischemic etiology, a cardiac MRI was ordered. After I saw her she has also been followed by [facility].  Other than some recent stomach flu she has been doing well with medications.  Temporarily spironolactone was held and lisinopril was reduced to once a day dosing however was able to increase it back up to 5 mg twice daily dosing and now 5 mg in the morning and 7-1/2 mg in the afternoon.  She did have some VT episodes on Zio and were associated with some symptoms. Referred her to EP for ICD considerations; medical therapy for 3 months was appropriately suggested and follow up afterwards for potential implant if LVEF remains low. NSVT burden was not high enough to warrant ICD.     We last saw her in our office on 10/2023 at which time we had recommended increasing entresto however in the interm she had ran out of entresto and noticed some symptom improvement regarding GI symptoms that she had related to entresto. Now she has been restarted on a lower dose of entresto. She saw EP recently as well who had wanted an echo 3 months from last medication change to assess for ICD need.     Since we last saw her she has " been doing well and has been able to be more active than usual. Her blood pressure ranges from 100-120/50-60. She can walk 2 blocks without getting short of breath. Her appetite has been good. Denies any chest pain. Her weight did go up slightly because of the holiday's and eating more from 124lbs to 128 over 2 months.  Otherwise she has been doing very well denies any new complaints.  Confirmed that she did have some potential GI concerns when increasing Entresto but tolerates the current dose well.  No other issues    PAST MEDICAL HISTORY:  Past Medical History:   Diagnosis Date    Chronic systolic heart failure (H)     DM2 (diabetes mellitus, type 2) (H)     Gastroesophageal reflux disease without esophagitis     Osteopenia      CURRENT MEDICATIONS:  Current Outpatient Medications   Medication Sig Dispense Refill    aspirin (ASA) 81 MG chewable tablet       atorvastatin (LIPITOR) 20 MG tablet Take 1 tablet (20 mg) by mouth daily 90 tablet 3    Calcium Carb-Cholecalciferol 500-10 MG-MCG TABS       metFORMIN (GLUCOPHAGE) 500 MG tablet 2 times daily (with meals)      metoprolol succinate ER (TOPROL XL) 25 MG 24 hr tablet Take 1.5 tablets (37.5 mg) by mouth daily 135 tablet 1    Multiple Vitamins-Minerals (ICAPS AREDS 2 PO)       sacubitril-valsartan (ENTRESTO) 24-26 MG per tablet Take 1 tablet by mouth 2 times daily 180 tablet 3    spironolactone (ALDACTONE) 25 MG tablet Take 1 tablet (25 mg) by mouth daily 180 tablet 3     FAMILY HISTORY:  Family History   Problem Relation Age of Onset    Diabetes Mother     Cerebrovascular Disease Father     Alzheimer Disease Father      SOCIAL HISTORY:  Social History     Socioeconomic History    Marital status:    Tobacco Use    Smoking status: Never     Passive exposure: Past    Smokeless tobacco: Never     ROS:   10 point ROS negative except HPI    EXAM:  /67 (BP Location: Right arm, Patient Position: Chair, Cuff Size: Adult Regular)   Pulse 72   Ht 1.5 m (4'  "11.06\")   Wt 58.5 kg (128 lb 14.4 oz)   SpO2 97%   BMI 25.99 kg/m    General: appears comfortable, no distress   Head: normocephalic, atraumatic  Eyes: anicteric sclera, EOMI  Neck: no adenopathy  Orophyarynx: clear and moist   Heart:  normal rate, regular rhythm, normal S1/S2, no murmur, no S3 or S4, estimated JVP 8 cm H2O  Lungs: clear, no rales or wheezing  Abdomen: soft, non-tender, non-distended, no hepatosplenomegaly  Extremities: no clubbing, cyanosis.  Only ttrace lower extremity edema.   Neurological: normal speech and affect, no gross motor deficits    Labs:  CBC RESULTS:  Lab Results   Component Value Date    WBC 8.5 10/16/2023    RBC 4.31 10/16/2023    HGB 13.6 10/16/2023    HCT 41.0 10/16/2023    MCV 95 10/16/2023    MCH 31.6 10/16/2023    MCHC 33.2 10/16/2023    RDW 12.5 10/16/2023     10/16/2023     CMP RESULTS:  Lab Results   Component Value Date     10/16/2023    POTASSIUM 4.7 10/16/2023    CHLORIDE 104.3 12/18/2023    CO2 26 10/16/2023    ANIONGAP 12 10/16/2023     (H) 10/16/2023     (H) 03/31/2023    BUN 18.4 10/16/2023    CR 1.01 (H) 10/16/2023    GFRESTIMATED 58 (L) 10/16/2023    JULIANNE 10.0 10/16/2023    BILITOTAL 0.2 10/16/2023    ALBUMIN 4.2 10/16/2023    ALKPHOS 109 (H) 10/16/2023    ALT 9 10/16/2023    AST 17 10/16/2023     Lab Results   Component Value Date    TRIG 86 06/08/2023     Coronary angiogram 3/31/2023:  Mild to moderate non obstructive CAD.  Non ischemic cardiomyopathy.  NIBP 117/56/78  RA --/--/3  RV 42/3  PA 42/14/24  PCWP --/--/10  PA Sat 62%  West CO/CI 3.5/2.2  TD CO/CI 3.6/2.3  SVR 1444 (TD) dynes  PVR 3.6 (TD) AMOR    Echocardiogram 3/16/23  Moderate left ventricular dilation (LVIDd 6.1cm). Severe diffuse hypokinesis.  Biplane LVEF is 14.3%.  The right ventricle is normal size and function.  Moderate mitral insufficiency is present due to dilated left ventricle.  Moderate pulmonary hypertension. Right ventricular systolic pressure is 52mmHg.  The " inferior vena cava was normal in size with preserved respiratory variability.     Zio 4/2023:  79 runs of VT up to 19 beats were noted in addition a few episodes of SVT.     CMRI 5/2023:  1. The left ventricle is severely dilated. There is increased trabeculations in the left ventricle. There is severe global hypokinesis. The systolic function severely reduced. The LVEF is traced at 19%.   2. The right ventricle is normal in cavity size. The global systolic function is normal. The RVEF is 51%.   3. The left atrium is severely enlarged. The right atrium is normal in size.   4. There is at least moderate functional mitral regurgitation.  5. There is mild hyperenhancement in the basal septum in the pattern of mid-myocardial stripe consistent with non ischemic fibrosis.    T1 mapping - ECV 33% (elevated).   T2 mapping - no myocardial edema present.   6. There is trivial pericardial effusion.  7. There is no intracardiac thrombus.  CONCLUSIONS:   Non-ischemic cardiomyopathy with severe LV dysfunction and normal right ventricular function, LVEF of 19% and RVEF 51%. No evidence of myocardial edema.      TTE 10/17/23:  Mild left ventricular dilation is present. Left ventricular function is decreased. The ejection fraction is 30-35% (moderately reduced). Abnormal septal motion consistent with left bundle branch block is present. Moderate diffuse hypokinesis is present.  Global right ventricular function is normal. Mild to moderate mitral insufficiency is present. Pulmonary artery systolic pressure is normal. Estimated mean right atrial pressure is normal.   No pericardial effusion is present.    Assessment and Plan:   72-year-old lady with above past medical history including nonischemic cardiomyopathy severely reduced ejection fraction 19% LVEF normal at left ventricular function with episodes of symptomatic VT who is here for follow-up.  Overall doing very well without any issues.  Able to increase the medications and  tolerates the increased dose of Entresto.  We are going to continue medication titration and increase Entresto to 37 and half milligram twice daily dosing and if tolerates this then in a week or 10 days we will increase it further to 49 mg twice daily dosing.  Echo done today and reviewed.  Left ventricular ejection fraction is around 25 to 30% on direct comparison to prior studies probably there is minimal change.  Unfortunately she did not quite respond to medical therapy as expected and the start given the low ejection fraction I think we should proceed with ICD implantation.  We will refer her back to EP at this time.  Along the same lines we are continuing to titrate medications as able.  We discussed to increase metoprolol XL to 37 mg daily dosing as tolerated.  In addition we should also try to increase the Entresto, increase the evening dose first to double that is 49 mg and keep the morning dose at 24 mg.  Once he tolerates this well in a couple of weeks we can try to increase it to 49 mg twice daily.  No other medication changes today, we will see her back in 6 months or so or sooner if needed.       # Chronic systolic heart failure/HFrEF secondary to NICM. EF bertha of 15-20%, improved to 30%   Stage C. NYHA Class III.  Fluid status: euvolemic, off loop diuretics   ACEi/ARB/ARNI: Entresto 24-26 mg BID, will increase  to 24-26 in AM and 48-52mg in PM.  BB: increase metoprolol succinate to 37.5 mg daily (was previously instructed to increase last visit but did not increase then)  Aldosterone: aldactone 25 mg daily   SGLT2i- cost prohibitive- could consider applying for patient assistance in the future  SCD prophylaxis: referred to EP for consideration of ICD   NSAID use: contraindicated  Sleep apnea evaluation: referred to sleep clinic (outside referall)  Remote monitoring: Would consider in the future if more volume concerns, currently I don't think this is needed    Bennett Tabor MD  Cardiology  Fellow  Plan Discussed with Attending Physician, Dr. Shakir Irvin MD    I have seen and evaluated the patient and agree with the assessment and plan as documented above.  Total time spent was 45 minutes including reviewing the medical history, recent echocardiogram from today and comparing to prior studies, face-to-face time with patient and her daughter and documenting in the chart.  Albaro Schilling MD

## 2024-02-02 NOTE — PROGRESS NOTES
ELECTROPHYSIOLOGY CLINIC VISIT    Assessment/Recommendations   Assessment/Plan:    Kayli Morillo is a 72 year old female with past medical history significant for HFrEF 2/2 NICM, nonobstructive CAD, and type II diabetes.     HFrEF 2/2 NICM, NYHA Symptom Class III Stage C  NSVT   ACE-I/ARB/ARNi: Continue entresto   BB: Continue Toprol XL   Aldosterone antagonist: Continue spironolactone   Fluid status: euvolemic  SCD prophylaxis: When she was last seen by EP, she had been on GDMT ~2 weeks, initial LVEF ~14%. Repeat echo done 10/17/23 with significant improvement in LVEF to 30-35%. She has continued to tolerate increasing doses of GDMT. Entresto dose increased 10/17 at visit with Dr Schilling. Planned to continue to hold on ICD with titration to maximal tolerated GDMT. Repeat echo done 1/23/2024 with LVEF reportedly 20-25%, abnormal septal motion consistent with left bundle branch block is present. Discussed proceeding with primary prevention defibrillator implant at this time. Patient female, LVEF <35%, -130 ms, discussed CRT vs ICD implant. If QRS duration 130-149 ms, this would be class IIa-b for CRTD implant. If QRS day of procedure <130 ms would proceed with ICD alone.  We discussed with the patient the rationale for ICD/CRTD placement, alternative therapies, technical aspects of the surgical procedure, risks/benefits of therapy and need for long-term follow-up in the Device Clinic.  The risk of defibrillator placement include: over sedation, reaction to local anesthetic, reaction to narcotics or benzodiazipines used for moderate secation, localized bleeding, internal bleeding, collapsed lung, and acute or late infections. There is the possibilty of unforseen complications as well such as device or lead failure, lead dislodgement, and inappropriate shocks from the defibrillator. In regard to resynchronization therapy, the additional risks included: no improvement in heart failure symptoms or inability  to place the left ventricular lead via the coronary sinus. All question/concerns were addressed. After our discussion, the patient verbalized understanding and wishes to proceed with ICD implant. Discussed with Dr Samson who is in agreement with above plan.     Follow up in 3 months after implant with echo prior.      History of Present Illness/Subjective    Ms. Kayli Morillo is a 73 year old female who comes in today for EP follow-up of NICM, NSVT.    She was initially seen by Dr Schilling on 4/24/23. Prior to his she had been admitted to OSH in March with dyspnea and mild peripheral edema. Work up during this admission demonstrated pleural effusion and decreased LVEF. Echo on 3/16/23 revealed decreased LVEF of 14%. A subsequent coronary angiogram revealed nonobstructive CAD. A right heart cath was then done on 3/16 this demonstrated normal filling pressures and borderline normal cardiac output. She then had cardiac MRI done fur further evaluation of decreased LVEF on 5/25 which demonstrated NICM with severe LV dysfunction and normal RV function, LVEF of 19% and RVEF 51%. When she has last seen by the heart failure team, her symptoms had been improving. Dr Schilling had ordered a ziopatch for further evaluation of arrhythmias that could be contributing to her decreased systolic function. That ziopatch demonstrated sinus rhythm with occasional NSVT runs.      EP Visit 8/1/23: She reports feeling well. She denies chest discomfort, palpitations, abdominal fullness/bloating or peripheral edema, shortness of breath, paroxysmal nocturnal dyspnea, orthopnea, lightheadedness, dizziness, pre-syncope, or syncope. Presenting 12 lead ECG shows sinus with PVCs Vent Rate 68 bpm,  ms,  ms, QTc 440 ms. Current cardiac medications include: ASA, lipitor, toprol XL, entresto and aldactone.     EP Visit 11/2/23: She presents today for follow up. She was seen in follow up by Dr Schilling on 10/17. Prior to visit she had repeat  echo done with LVEF 30-35%. She has tolerated GDMT well, at last visit, Dr Schilling increased entresto dose further to 49 mg twice daily. Repeat echo ordered in 3 months. She reports feeling well, she has been able to do chores around the house. Notes considerable improvement in dyspnea since starting GDMT. Blood pressures stable, -110. She denies chest discomfort, peripheral edema, shortness of breath, orthopnea, lightheadedness or dizziness. Current cardiac medications include: lipitor, ASA, entreso, aldactone and metoprolol succinate.     She presents today for follow up. She had repeat echo done on 1/23/24 with LVEF 20-25%. She has been feeling well, able to walk several blocks until developing dyspnea. Reports she has some worsening of GI symptoms on increased entresto dose, is tolerating current dose ok. She chest discomfort, palpitations, abdominal fullness/bloating or peripheral edema, shortness of breath, paroxysmal nocturnal dyspnea, orthopnea, lightheadedness, dizziness, pre-syncope, or syncope. Presenting 12 lead ECG shows sinus Vent Rate 91 bpm,  ms,  ms, QTc 492 ms.     I have reviewed and updated the patient's Past Medical History, Social History, Family History and Medication List.     Cardiographics (Personally Reviewed) :   Echo: 1/23/2024   Interpretation Summary  There is mild to moderate left ventricular dilation present and the visual  ejection fraction is 20-25% with moderate diffuce hypokinesis  Grade I or early diastolic dysfunction.  Right ventricular function, chamber size, wall motion, and thickness are  normal.  Moderate mitral insufficiency is present.  The inferior vena cava is normal.  No pericardial effusion is present.  Compared to priro imaging on 10/17/2023 ejection fraction is slightly worse and the mitral regurgitation is slightly  worse on a few images (apical four) but this may be due to incomplete interrogation on prior imaging.    Echo: 10/17/2023    Interpretation Summary  Mild left ventricular dilation is present. Left ventricular function is  decreased. The ejection fraction is 30-35% (moderately reduced). Abnormal  septal motion consistent with left bundle branch block is present. Moderate  diffuse hypokinesis is present.  Global right ventricular function is normal.  Mild to moderate mitral insufficiency is present.  Pulmonary artery systolic pressure is normal.  Estimated mean right atrial pressure is normal.  No pericardial effusion is present.    Echo: 3/16/23  Interpretation Summary  Moderate left ventricular dilation (LVIDd 6.1cm). Severe diffuse hypokinesis.  Biplane LVEF is 14.3%.  The right ventricle is normal size and function.  Moderate mitral insufficiency is present due to dilated left ventricle.  Moderate pulmonary hypertension. Right ventricular systolic pressure is 52  mmHg.  The inferior vena cava was normal in size with preserved respiratory  variability.  There is no prior study for direct comparison. Dr. Quarles informed of the  findings.     Ziopatch: 4/25/23-5/9/23  Sinus 55 -168 bpm. 79 NSVT runs, avg 127 bpm. 2.6% PVC burden.      Coronary Angiogram/RHC 3/31/23  Right sided filling pressures are normal.  Left sided filling pressures are normal.  Mild elevated pulmonary hypertension.  Normal cardiac output level.  Hemodynamic data has been modified in Epic per physician review.  Mild to moderate non obstructive CAD.  Non ischemic cardiomyopathy.     CMR: 5/25/23  1. The left ventricle is severely dilated. There is increased trabeculations in the left ventricle. There  is severe global hypokinesis. The systolic function severely reduced. The LVEF is traced at 19%.   2. The right ventricle is normal in cavity size. The global systolic function is normal. The RVEF is 51%.   3. The left atrium is severely enlarged. The right atrium is normal in size.   4. There is at least moderate functional mitral regurgitation.  5. There is mild  "hyperenhancement in the basal septum in the pattern of mid-myocardial stripe consistent  with non ischemic fibrosis.    T1 mapping - ECV 33% (elevated).   T2 mapping - no myocardial edema present.   6. There is trivial pericardial effusion.  7. There is no intracardiac thrombus.  CONCLUSIONS:   Non-ischemic cardiomyopathy with severe LV dysfunction and normal right ventricular function, LVEF of 19%  and RVEF 51%.  No evidence of myocardial edema.        Physical Examination   /75 (BP Location: Right arm, Patient Position: Sitting, Cuff Size: Adult Regular)   Pulse 89   Wt 59.9 kg (132 lb 1.6 oz)   SpO2 97%   BMI 26.63 kg/m    Wt Readings from Last 3 Encounters:   02/06/24 59.9 kg (132 lb 1.6 oz)   01/23/24 58.5 kg (128 lb 14.4 oz)   12/22/23 56.3 kg (124 lb 3.2 oz)     General Appearance:   Alert, well-appearing and in no acute distress.   HEENT: Atraumatic, normocephalic. MMM.   Chest/Lungs:   Respirations unlabored.  Lungs are clear to auscultation.   Cardiovascular:   Regular rate and rhythm.  S1/S2. No murmur.    Abdomen:  Soft, nontender, nondistended.   Extremities: No cyanosis or clubbing. No edema.    Musculoskeletal: Moves all extremities.     Skin: Warm, dry, intact.    Neurologic: Mood and affect are appropriate.  Alert and oriented to person, place, time, and situation.          Medications  Allergies   Lipitor 20 mg daily   ASA 81 mg daily   Entresto 49-51 two tab am, two tabs pm   Aldactone 25 mg daily   Metoprolol succinate 37.5 mg daily     Metformin    No Known Allergies      Lab Results (Personally Reviewed)    Chemistry/lipid CBC Cardiac Enzymes/BNP/TSH/INR   Lab Results   Component Value Date    BUN 25.1 (H) 01/23/2024     01/23/2024    CO2 29 01/23/2024     Creatinine   Date Value Ref Range Status   01/23/2024 0.94 0.51 - 0.95 mg/dL Final       No results found for: \"CHOL\", \"HDL\", \"LDL\", \"CHOLHDL\"   Lab Results   Component Value Date    WBC 7.6 01/23/2024    HGB 13.4 01/23/2024 "    HCT 41.4 01/23/2024    MCV 97 01/23/2024     01/23/2024    Lab Results   Component Value Date    TSH 0.64 03/16/2023    INR 1.01 03/31/2023        The patient states understanding and is agreeable with the plan.     Darlene Barahona PA-C  [facility]  Electrophysiology Consult Service  Pager: [phone]    I spent a total of 20 minutes face to face with Kayli Morillo during today's office visit. I have spent an additional 15 minutes today on chart review and documentation.

## 2024-02-06 ENCOUNTER — OFFICE VISIT (OUTPATIENT)
Dept: CARDIOLOGY | Facility: CLINIC | Age: 74
End: 2024-02-06
Payer: MEDICARE

## 2024-02-06 ENCOUNTER — CARE COORDINATION (OUTPATIENT)
Dept: CARDIOLOGY | Facility: CLINIC | Age: 74
End: 2024-02-06
Payer: MEDICARE

## 2024-02-06 VITALS
HEART RATE: 89 BPM | BODY MASS INDEX: 26.63 KG/M2 | OXYGEN SATURATION: 97 % | WEIGHT: 132.1 LBS | DIASTOLIC BLOOD PRESSURE: 75 MMHG | SYSTOLIC BLOOD PRESSURE: 127 MMHG

## 2024-02-06 DIAGNOSIS — I10 BENIGN ESSENTIAL HYPERTENSION: Primary | ICD-10-CM

## 2024-02-06 DIAGNOSIS — R06.02 SHORTNESS OF BREATH: ICD-10-CM

## 2024-02-06 DIAGNOSIS — I47.29 NSVT (NONSUSTAINED VENTRICULAR TACHYCARDIA) (H): ICD-10-CM

## 2024-02-06 DIAGNOSIS — I42.8 NONISCHEMIC CARDIOMYOPATHY (H): ICD-10-CM

## 2024-02-06 DIAGNOSIS — I50.20 HEART FAILURE WITH REDUCED EJECTION FRACTION, NYHA CLASS III (H): ICD-10-CM

## 2024-02-06 DIAGNOSIS — I42.8 NON-ISCHEMIC CARDIOMYOPATHY (H): Primary | ICD-10-CM

## 2024-02-06 DIAGNOSIS — Z95.810 CARDIAC RESYNCHRONIZATION THERAPY DEFIBRILLATOR (CRT-D) IN PLACE: ICD-10-CM

## 2024-02-06 DIAGNOSIS — R93.1 DECREASED CARDIAC EJECTION FRACTION: ICD-10-CM

## 2024-02-06 PROCEDURE — G0463 HOSPITAL OUTPT CLINIC VISIT: HCPCS

## 2024-02-06 PROCEDURE — 93005 ELECTROCARDIOGRAM TRACING: CPT

## 2024-02-06 PROCEDURE — 99214 OFFICE O/P EST MOD 30 MIN: CPT

## 2024-02-06 PROCEDURE — 93010 ELECTROCARDIOGRAM REPORT: CPT | Performed by: INTERNAL MEDICINE

## 2024-02-06 RX ORDER — SACUBITRIL AND VALSARTAN 49; 51 MG/1; MG/1
1 TABLET, FILM COATED ORAL 2 TIMES DAILY
Qty: 180 TABLET | Refills: 3 | Status: SHIPPED | OUTPATIENT
Start: 2024-02-06 | End: 2024-02-15

## 2024-02-06 ASSESSMENT — PAIN SCALES - GENERAL: PAINLEVEL: NO PAIN (0)

## 2024-02-06 NOTE — LETTER
2/6/2024      RE: Kayli Morillo  [address] [facility] [address]       Dear Colleague,    Thank you for the opportunity to participate in the care of your patient, Kayli Morillo, at the [facility]. Please see a copy of my visit note below.        ELECTROPHYSIOLOGY CLINIC VISIT    Assessment/Recommendations   Assessment/Plan:    Kayli Morillo is a 72 year old female with past medical history significant for HFrEF 2/2 NICM, nonobstructive CAD, and type II diabetes.     HFrEF 2/2 NICM, NYHA Symptom Class III Stage C  NSVT   ACE-I/ARB/ARNi: Continue entresto   BB: Continue Toprol XL   Aldosterone antagonist: Continue spironolactone   Fluid status: euvolemic  SCD prophylaxis: When she was last seen by EP, she had been on GDMT ~2 weeks, initial LVEF ~14%. Repeat echo done 10/17/23 with significant improvement in LVEF to 30-35%. She has continued to tolerate increasing doses of GDMT. Entresto dose increased 10/17 at visit with Dr Schilling. Planned to continue to hold on ICD with titration to maximal tolerated GDMT. Repeat echo done 1/23/2024 with LVEF reportedly 20-25%, abnormal septal motion consistent with left bundle branch block is present. Discussed proceeding with primary prevention defibrillator implant at this time. Patient female, LVEF <35%, -130 ms, NYHA III with class I indication for CRTD.   We discussed with the patient the rationale for CRTD placement, alternative therapies, technical aspects of the surgical procedure, risks/benefits of therapy and need for long-term follow-up in the Device Clinic.  The risk of defibrillator placement include: over sedation, reaction to local anesthetic, reaction to narcotics or benzodiazipines used for moderate secation, localized bleeding, internal bleeding, collapsed lung, and acute or late infections. There is the possibilty of unforseen complications as well such as  device or lead failure, lead dislodgement, and inappropriate shocks from the defibrillator. In regard to resynchronization therapy, the additional risks included: no improvement in heart failure symptoms or inability to place the left ventricular lead via the coronary sinus. All question/concerns were addressed. After our discussion, the patient verbalized understanding and wishes to proceed with ICD implant. Discussed with Dr Samson who is in agreement with above plan.     Follow up in 3 months after implant with echo prior.      History of Present Illness/Subjective    Ms. Kayli Morillo is a 73 year old female who comes in today for EP follow-up of NICM, NSVT.    She was initially seen by Dr Schilling on 4/24/23. Prior to his she had been admitted to OSH in March with dyspnea and mild peripheral edema. Work up during this admission demonstrated pleural effusion and decreased LVEF. Echo on 3/16/23 revealed decreased LVEF of 14%. A subsequent coronary angiogram revealed nonobstructive CAD. A right heart cath was then done on 3/16 this demonstrated normal filling pressures and borderline normal cardiac output. She then had cardiac MRI done fur further evaluation of decreased LVEF on 5/25 which demonstrated NICM with severe LV dysfunction and normal RV function, LVEF of 19% and RVEF 51%. When she has last seen by the heart failure team, her symptoms had been improving. Dr Schilling had ordered a ziopatch for further evaluation of arrhythmias that could be contributing to her decreased systolic function. That ziopatch demonstrated sinus rhythm with occasional NSVT runs.      EP Visit 8/1/23: She reports feeling well. She denies chest discomfort, palpitations, abdominal fullness/bloating or peripheral edema, shortness of breath, paroxysmal nocturnal dyspnea, orthopnea, lightheadedness, dizziness, pre-syncope, or syncope. Presenting 12 lead ECG shows sinus with PVCs Vent Rate 68 bpm,  ms,  ms, QTc 440 ms.  Current cardiac medications include: ASA, lipitor, toprol XL, entresto and aldactone.     EP Visit 11/2/23: She presents today for follow up. She was seen in follow up by Dr Schilling on 10/17. Prior to visit she had repeat echo done with LVEF 30-35%. She has tolerated GDMT well, at last visit, Dr Schilling increased entresto dose further to 49 mg twice daily. Repeat echo ordered in 3 months. She reports feeling well, she has been able to do chores around the house. Notes considerable improvement in dyspnea since starting GDMT. Blood pressures stable, -110. She denies chest discomfort, peripheral edema, shortness of breath, orthopnea, lightheadedness or dizziness. Current cardiac medications include: lipitor, ASA, entreso, aldactone and metoprolol succinate.     She presents today for follow up. She had repeat echo done on 1/23/24 with LVEF 20-25%. She has been feeling well, able to walk several blocks until developing dyspnea. Reports she has some worsening of GI symptoms on increased entresto dose, is tolerating current dose ok. She chest discomfort, palpitations, abdominal fullness/bloating or peripheral edema, shortness of breath, paroxysmal nocturnal dyspnea, orthopnea, lightheadedness, dizziness, pre-syncope, or syncope. Presenting 12 lead ECG shows sinus Vent Rate 91 bpm,  ms,  ms, QTc 492 ms.     I have reviewed and updated the patient's Past Medical History, Social History, Family History and Medication List.     Cardiographics (Personally Reviewed) :   Echo: 1/23/2024   Interpretation Summary  There is mild to moderate left ventricular dilation present and the visual  ejection fraction is 20-25% with moderate diffuce hypokinesis  Grade I or early diastolic dysfunction.  Right ventricular function, chamber size, wall motion, and thickness are  normal.  Moderate mitral insufficiency is present.  The inferior vena cava is normal.  No pericardial effusion is present.  [facility] ejection fraction is slightly worse and the mitral regurgitation is slightly  worse on a few images (apical four) but this may be due to incomplete interrogation on prior imaging.    Echo: 10/17/2023   Interpretation Summary  Mild left ventricular dilation is present. Left ventricular function is  decreased. The ejection fraction is 30-35% (moderately reduced). Abnormal  septal motion consistent with left bundle branch block is present. Moderate  diffuse hypokinesis is present.  Global right ventricular function is normal.  Mild to moderate mitral insufficiency is present.  Pulmonary artery systolic pressure is normal.  Estimated mean right atrial pressure is normal.  No pericardial effusion is present.    Echo: 3/16/23  Interpretation Summary  Moderate left ventricular dilation (LVIDd 6.1cm). Severe diffuse hypokinesis.  Biplane LVEF is 14.3%.  The right ventricle is normal size and function.  Moderate mitral insufficiency is present due to dilated left ventricle.  Moderate pulmonary hypertension. Right ventricular systolic pressure is 52  mmHg.  The inferior vena cava was normal in size with preserved respiratory  variability.  There is no prior study for direct comparison. Dr. Quarles informed of the  findings.     Ziopatch: 4/25/23-5/9/23  Sinus 55 -168 bpm. 79 NSVT runs, avg 127 bpm. 2.6% PVC burden.      Coronary Angiogram/RHC 3/31/23  Right sided filling pressures are normal.  Left sided filling pressures are normal.  Mild elevated pulmonary hypertension.  Normal cardiac output level.  Hemodynamic data has been modified in Epic per physician review.  Mild to moderate non obstructive CAD.  Non ischemic cardiomyopathy.     CMR: 5/25/23  1. The left ventricle is severely dilated. There is increased trabeculations in the left ventricle. There  is severe global hypokinesis. The systolic function severely reduced. The LVEF is traced at 19%.   2. The right ventricle is normal in cavity size. The global  systolic function is normal. The RVEF is 51%.   3. The left atrium is severely enlarged. The right atrium is normal in size.   4. There is at least moderate functional mitral regurgitation.  5. There is mild hyperenhancement in the basal septum in the pattern of mid-myocardial stripe consistent  with non ischemic fibrosis.    T1 mapping - ECV 33% (elevated).   T2 mapping - no myocardial edema present.   6. There is trivial pericardial effusion.  7. There is no intracardiac thrombus.  CONCLUSIONS:   Non-ischemic cardiomyopathy with severe LV dysfunction and normal right ventricular function, LVEF of 19%  and RVEF 51%.  No evidence of myocardial edema.        Physical Examination   /75 (BP Location: Right arm, Patient Position: Sitting, Cuff Size: Adult Regular)   Pulse 89   Wt 59.9 kg (132 lb 1.6 oz)   SpO2 97%   BMI 26.63 kg/m    Wt Readings from Last 3 Encounters:   02/06/24 59.9 kg (132 lb 1.6 oz)   01/23/24 58.5 kg (128 lb 14.4 oz)   12/22/23 56.3 kg (124 lb 3.2 oz)     General Appearance:   Alert, well-appearing and in no acute distress.   HEENT: Atraumatic, normocephalic. MMM.   Chest/Lungs:   Respirations unlabored.  Lungs are clear to auscultation.   Cardiovascular:   Regular rate and rhythm.  S1/S2. No murmur.    Abdomen:  Soft, nontender, nondistended.   Extremities: No cyanosis or clubbing. No edema.    Musculoskeletal: Moves all extremities.     Skin: Warm, dry, intact.    Neurologic: Mood and affect are appropriate.  Alert and oriented to person, place, time, and situation.          Medications  Allergies   Lipitor 20 mg daily   ASA 81 mg daily   Entresto 49-51 two tab am, two tabs pm   Aldactone 25 mg daily   Metoprolol succinate 37.5 mg daily     Metformin    No Known Allergies      Lab Results (Personally Reviewed)    Chemistry/lipid CBC Cardiac Enzymes/BNP/TSH/INR   Lab Results   Component Value Date    BUN 25.1 (H) 01/23/2024     01/23/2024    CO2 29 01/23/2024     Creatinine   Date  "Value Ref Range Status   01/23/2024 0.94 0.51 - 0.95 mg/dL Final       No results found for: \"CHOL\", \"HDL\", \"LDL\", \"CHOLHDL\"   Lab Results   Component Value Date    WBC 7.6 01/23/2024    HGB 13.4 01/23/2024    HCT 41.4 01/23/2024    MCV 97 01/23/2024     01/23/2024    Lab Results   Component Value Date    TSH 0.64 03/16/2023    INR 1.01 03/31/2023        The patient states understanding and is agreeable with the plan.     Darlene Barahona PA-C  [facility]  Electrophysiology Consult Service  Pager: [phone]    I spent a total of 20 minutes face to face with Kayli Morillo during today's office visit. I have spent an additional 15 minutes today on chart review and documentation.                     Please do not hesitate to contact me if you have any questions/concerns.     Sincerely,     Darlene Barahona PA-C  "

## 2024-02-06 NOTE — LETTER
February 13, 2024      TO: Kayli Morillo  [address] [facility] [address]         Dear Kayli,    You are scheduled for a Cardiac Resynchronization Therapy Defibrillator (CRT-D) implant, at [facility], [place]. The hospital is located at [address] [facility] on the [place] bank of the campus.  If you need to cancel this procedure, please call [phone].     Visitor Policy: Two visitors.    Date:_April 8, 2024_____  Time: _6:30 am_____To the Gold Waiting Room at the [facility]    1. Please review the attached instructions on showering before your procedure at the end of this letter.  2. Your history and physical will be completed by our advanced practice provider when you arrive.  3. Do not eat for 8 hours prior to arrival, you can drink water up until 2 hours prior.  4. Medications to continue:  - Take all meds as prescribed, except for those noted below.  5. Medications to hold:    - Morning of: spironolactone, metformin  6. You will likely discharge the same day and need a .      ICD Educational Tool Kit provided today:   ICD Decision Making Tool  https://patientdecisionaid.org/wp-content/uploads/[email or web address]/03/ICD-Pamphlet-[email or web address].pdf    Post-Procedure Instructions  Wound Care  Keep your incision (surgery wound) dry for 3 days.  After 3 days, you may remove the outer bandage.  Keep the strips of tape on.  They will be removed at your clinic visit.  Check for signs of infection each day.  These include increased redness, swelling, drainage or a fever over 101 F (38.3 C).  Call us immediately if you see any of   these signs.  If there are no signs of infection, you may shower in 3 days.  Do not submerge the incision (in a bath tub, hot tub, or swimming pool) until fully healed.    Pain  You may have mild to moderate pain for 3 to 5 days.  Take acetaminophen (Tylenol) or ibuprofen (Advil) for the pain.  Call us if the pain is severe or lasts more than 5 days.  Activity  You should  slowly go back to your normal activities after 24 hours.  Healing will take 4 to 6 weeks.  No driving for 3 days  Avoid climbing a ladder alone.  It is best to stay within 4 feet of the ground.  Avoid anything that may cause rough contact or a hard hit to your chest.  This includes football, hockey, and other contact sports.  Do not go swimming or boating alone.    FOR ATLEAST 4 WEEKS:  Do not raise your affected arm above your shoulder.  Do not use your affected arm to push, pull, or lift anything over 10 pounds.  Avoid repetitive upper body activities for 6 weeks (ie: golf, swimming, and weight lifting)  Follow Up Appointments Date & Time   7-10 day incision check with device clinic  April 18, 2024  2p   3 month follow up visit with ECHO, device check, & provider July 16, 2024 10 am echo, 11 am device check and 11:30 am Darlene Barahona PA-C       If you have further questions, please utilize [employer] to contact us.   If your question concerns the above instructions, contact:  Pamela Sloan RN  Electrophysiology Nurse Coordinator.  [phone]    If your question concerns the schedule/appointment times, contact:  [facility] Procedure    [phone]    Device Clinic (Pacemakers, Defibrillators, Loop Recorders)  [phone]  Showering Before Surgery   Your surgeon has asked you to take 2 showers before surgery.  Why is this important?  It is normal for bacteria (germs) to be on your skin. The skin protects us from these germs. When you have surgery, we cut the skin. Sometimes germs get into the cuts and cause infection (illness caused by germs). By following the instructions below and using special soap, you will lower the number of germs on your skin. This decreases your chance of infection.  Special soap  Buy or get 8 ounces of antiseptic surgical soap called 4% CHG. Common name brands of this soap are Hibiclens and Exidine.   You can find it at your local pharmacy, clinic or retail store. If you have  trouble, ask your pharmacist to help you find the right substitute.   A note about shaving:  Do not shave within 12 inches of your incision (surgical cut) area for at least 3 days before surgery. Shaving can make small cuts in the skin. This puts you at a higher risk of infection.  Items you will need for each shower:   1 newly washed towel   4 ounces of one of the above soaps  Follow these instructions:  The evening before surgery   1. Wash your hair and body with your regular shampoo and soap. Make sure you rinse the shampoo and soap from your hair and body.   2. Using clean hands, apply about 2 ounces of soap gently on your skin from the neck to your toes. Use on your groin area last. Do not use this soap on your face or head. If you get any soap in your eyes, ears or mouth, rinse right away.   3. Repeat step 2. It is very important to let the soap stay on your skin for at least 1 minute.   4. Rinse well and dry off using a clean towel.If you feel any tingling, itching or other irritation, rinse right away. It is normal to feel some coolness on the skin after using the antiseptic soap. Your skin may feel a bit dry after the shower, but do not use any lotions, creams or moisturizers. Do not use hair spray or other products in your hair.  5. Dress in freshly washed clothes or pajamas. Use fresh pillowcases and sheets on your bed.  The morning of surgery  1. Wash your hair and body with your regular shampoo and soap. Make sure you rinse the shampoo and soap from your hair and body.   2. Using clean hands, apply about 2 ounces of soap gently on your skin from the neck to your toes. Use on your groin area last. Do not use this soap on your face or head. If you get any soap in your eyes, ears or mouth, rinse right away.   3. Repeat step 2. It is very important to let the soap stay on your skin for at least 1 minute.   4. Rinse well and dry off using a clean towel.If you feel any tingling, itching or other irritation,  rinse right away. It is normal to feel some coolness on the skin after using the antiseptic soap. Your skin may feel a bit dry after the shower, but do not use any lotions, creams or moisturizers. Do not use hair spray or other products in your hair.  5. Dress in clean clothes.  If you have any questions about showering or an allergy to CHG soap, please call the Preadmissions Nursing Department at the hospital where you are having your surgery.  [facility]: [phone]  [facility]: [phone]  [facility]: [phone] or [phone]  [facility]: [phone].  [facility]: [phone]  [place]: [phone]  [facility] ([place]): [phone]  [facility] ([facility]): [phone]  This phone number will be answered between the hours of 8:00 a.m. and 6:30 p.m. Monday through Friday.                                                       MAP - This map outlines the clinic - the hospital is to the left                                                        HOTELS - Please mention that you are a patient, they may have a discount available  Some hotels may have shuttle service - [facility], [employer] [facility], and [facility] have been using the hospital shuttle for your convenience.

## 2024-02-06 NOTE — NURSING NOTE
Chief Complaint   Patient presents with    Follow Up     3 mo follow up for hf/nsvt     Vitals were taken and medications reconciled.    Krystle Goldsmith CNA  10:29 AM

## 2024-02-06 NOTE — PROGRESS NOTES
"Patient was recently instructed at her last clinic visit to:  \"Increase Entresto to 24-26 MG tablets, take one every AM and two every PM. If you are able to tolerate the increase, after two weeks, increase Entresto to 24-26 MG tablets, take two every am and tow every pm. \"  Patient was called and states she is tolerating the evening increase and will increase as directed above. A new prescription was sent to the pharmacy with the increase dose, Entesto 49-51 MG twice daily.     "

## 2024-02-06 NOTE — PATIENT INSTRUCTIONS
You were seen in the Electrophysiology Clinic today by: Darlene RAHMAN    Plan:     Follow up Visit:  To be determined we will contact you       If you have further questions, please utilize [employer] to contact us.     Your Care Team:    EP Cardiology   Telephone Number     Nurse Line  Andra Kessler, RN   Pamela Sloan, RN  Ronnie Lugo, KATERINA   [phone]     For scheduling appointments:   [facility]   [phone]   For procedure scheduling:    Love Quintanilla     [phone]   For the Device Clinic (Pacemakers, ICDs, Loop Recorders)    During business hours: [phone]  After business hours:   [phone]- select option 4 and ask for job code [id number].       On-call cardiologist for after hours or on weekends:   [phone], option #4, and ask to speak to the on-call cardiologist.     Cardiovascular Clinic:   [address] [place] [address]. [place] [address]      As always, Thank you for trusting us with your health care needs!

## 2024-02-07 LAB
ATRIAL RATE - MUSE: 91 BPM
DIASTOLIC BLOOD PRESSURE - MUSE: NORMAL MMHG
INTERPRETATION ECG - MUSE: NORMAL
P AXIS - MUSE: 66 DEGREES
PR INTERVAL - MUSE: 140 MS
QRS DURATION - MUSE: 124 MS
QT - MUSE: 400 MS
QTC - MUSE: 492 MS
R AXIS - MUSE: -45 DEGREES
SYSTOLIC BLOOD PRESSURE - MUSE: NORMAL MMHG
T AXIS - MUSE: -51 DEGREES
VENTRICULAR RATE- MUSE: 91 BPM

## 2024-02-10 RX ORDER — SODIUM CHLORIDE 9 MG/ML
INJECTION, SOLUTION INTRAVENOUS CONTINUOUS
Status: CANCELLED | OUTPATIENT
Start: 2024-02-10

## 2024-02-10 RX ORDER — LIDOCAINE 40 MG/G
CREAM TOPICAL
Status: CANCELLED | OUTPATIENT
Start: 2024-02-10

## 2024-02-10 RX ORDER — CEFAZOLIN SODIUM 2 G/50ML
2 SOLUTION INTRAVENOUS
Status: CANCELLED | OUTPATIENT
Start: 2024-02-10

## 2024-04-02 ENCOUNTER — TELEPHONE (OUTPATIENT)
Dept: CARDIOLOGY | Facility: CLINIC | Age: 74
End: 2024-04-02
Payer: MEDICARE

## 2024-04-02 NOTE — TELEPHONE ENCOUNTER
Called waitlist patient and offered an appointment with Dr. Schilling on this date 04/04/24 & 3:00 pm at this location [facility]     Please note there is no guarantee this appointment will be available as it is first come first serve.

## 2024-04-11 ENCOUNTER — PATIENT OUTREACH (OUTPATIENT)
Dept: CARDIOLOGY | Facility: CLINIC | Age: 74
End: 2024-04-11
Payer: MEDICARE

## 2024-04-11 NOTE — PROGRESS NOTES
Called and spoke to patient to review pre-procedure instructions for upcoming CRTD implant procedure on 4/16/24. Writer reviewed medication holds in detail. Patient received letter and does not have any questions at this time.

## 2024-04-15 NOTE — H&P
Electrophysiology Pre-Procedure History and Physical    Kayli Morillo MRN# [id number]   Age: 73 year old YOB: 1950      Date of Procedure: 4/16/2024 [facility]      Date of Exam 4/16/2024 Facility (Same day)       HPI:  Kayli Morillo is a 72 year old female with past medical history significant for HFrEF 2/2 NICM, nonobstructive CAD, and type II diabetes. She has NICM with initial LVEF ~14%. Repeat echo done 10/17/23 with improvement in LVEF to 30-35%. She has continued to tolerate increasing doses of GDMT. Entresto dose increased 10/17 at visit with Dr Schilling. A repeat echo done 1/23/2024 with LVEF reportedly 20-25%, abnormal septal motion consistent with left bundle branch block is present. Discussed proceeding with primary prevention defibrillator implant at this time. Patient female, LVEF <35%, -130 ms, NYHA III with class I indication for ICD but does not meet criteria for CRT. She presents today for ICD implant.       Active problem list:     Patient Active Problem List    Diagnosis Date Noted     Status post coronary angiogram 03/31/2023     Priority: Medium            Medications (include herbals and vitamins):      No current facility-administered medications for this encounter.     Current Outpatient Medications   Medication Sig Dispense Refill     aspirin (ASA) 81 MG chewable tablet        atorvastatin (LIPITOR) 20 MG tablet Take 1 tablet (20 mg) by mouth daily 90 tablet 3     Calcium Carb-Cholecalciferol 500-10 MG-MCG TABS        metFORMIN (GLUCOPHAGE) 500 MG tablet 2 times daily (with meals)       metoprolol succinate ER (TOPROL XL) 25 MG 24 hr tablet Take 1.5 tablets (37.5 mg) by mouth daily 135 tablet 1     Multiple Vitamins-Minerals (ICAPS AREDS 2 PO)        sacubitril-valsartan (ENTRESTO) 49-51 MG per tablet Take 1 tablet by mouth 2 times daily 180 tablet 3     spironolactone (ALDACTONE) 25 MG tablet Take 1 tablet (25  mg) by mouth daily 180 tablet 3           Medication List         Notice    Cannot display discharge medications because the patient has not yet been admitted.              Allergies:    No Known Allergies          Social History:     Social History     Tobacco Use     Smoking status: Never     Passive exposure: Past     Smokeless tobacco: Never   Substance Use Topics     Alcohol use: Not on file            Physical Exam:   All vitals have been reviewed  No data found.  No intake/output data recorded.  Airway assessment:   Patient is able to open mouth wide  Patient is able to stick out tongue}      ENT:   Normocephalic, without obvious abnormality, atraumatic, sinuses nontender on palpation, external ears without lesions, oral pharynx with moist mucous membranes, tonsils without erythema or exudates, gums normal and good dentition.     Neck:   Supple, symmetrical, trachea midline, no adenopathy, thyroid symmetric, not enlarged and no tenderness, skin normal     Lungs:   No increased work of breathing, good air exchange, clear to auscultation bilaterally, no crackles or wheezing     Cardiovascular:   Normal apical impulse, regular rate and rhythm, normal S1 and S2, no S3 or S4, and no murmur noted             Lab / Radiology Results:     Lab Results   Component Value Date    WBC 7.6 01/23/2024    RBC 4.29 01/23/2024    HGB 13.4 01/23/2024    HCT 41.4 01/23/2024    MCV 97 01/23/2024    RDW 12.6 01/23/2024     01/23/2024      Lab Results   Component Value Date    WBC 7.6 01/23/2024     Lab Results   Component Value Date     01/23/2024     Lab Results   Component Value Date    HGB 13.4 01/23/2024    HCT 41.4 01/23/2024     Lab Results   Component Value Date     01/23/2024    CO2 29 01/23/2024    BUN 25.1 01/23/2024     Lab Results   Component Value Date     01/23/2024    CO2 29 01/23/2024    BUN 25.1 01/23/2024     Lab Results   Component Value Date    TSH 0.64 03/16/2023             Plan:    Patient's active problems diagnostically and therapeutically optimized for the planned procedure. Patient here for ICD implant. Procedure explained in detail to patient including indications, risks, and benefits. We discussed with the patient the rationale for ICD placement, alternative therapies,  technical aspects of the surgical procedure, risks/benefits of therapy and need for long-term follow-up in the Device Clinic.  The risk of defibrillator placement include: over sedation, reaction to local anesthetic, reaction to narcotics or benzodiazipines used for moderate secation, localized bleeding, internal bleeding, collapsed lung, and acute or late infections. There is the possibilty of unforseen complications as well such as device or lead failure, lead dislodgement, and inappropriate shocks from the defibrillator. All question/concerns were addressed. After our discussion, the patient verbalized understanding and wishes to proceed with ICD implant.        SHANICE Felder CNP  Electrophysiology Consult Service  Securely message with [employer]   Text page via [facility] Paging/Directory

## 2024-04-16 ENCOUNTER — HOSPITAL ENCOUNTER (OUTPATIENT)
Facility: CLINIC | Age: 74
Discharge: HOME | End: 2024-04-16
Attending: INTERNAL MEDICINE | Admitting: INTERNAL MEDICINE
Payer: MEDICARE

## 2024-04-16 ENCOUNTER — APPOINTMENT (OUTPATIENT)
Dept: LAB | Facility: CLINIC | Age: 74
End: 2024-04-16
Attending: INTERNAL MEDICINE
Payer: MEDICARE

## 2024-04-16 ENCOUNTER — APPOINTMENT (OUTPATIENT)
Dept: MEDSURG UNIT | Facility: CLINIC | Age: 74
End: 2024-04-16
Attending: INTERNAL MEDICINE
Payer: MEDICARE

## 2024-04-16 ENCOUNTER — APPOINTMENT (OUTPATIENT)
Dept: GENERAL RADIOLOGY | Facility: CLINIC | Age: 74
End: 2024-04-16
Attending: NURSE PRACTITIONER
Payer: MEDICARE

## 2024-04-16 ENCOUNTER — ANCILLARY PROCEDURE (OUTPATIENT)
Dept: CARDIOLOGY | Facility: CLINIC | Age: 74
End: 2024-04-16
Attending: NURSE PRACTITIONER
Payer: MEDICARE

## 2024-04-16 VITALS
TEMPERATURE: 98.7 F | BODY MASS INDEX: 26.36 KG/M2 | RESPIRATION RATE: 16 BRPM | SYSTOLIC BLOOD PRESSURE: 137 MMHG | WEIGHT: 130.73 LBS | OXYGEN SATURATION: 97 % | DIASTOLIC BLOOD PRESSURE: 86 MMHG | HEART RATE: 73 BPM

## 2024-04-16 DIAGNOSIS — R93.1 DECREASED CARDIAC EJECTION FRACTION: ICD-10-CM

## 2024-04-16 DIAGNOSIS — Z95.810 S/P ICD (INTERNAL CARDIAC DEFIBRILLATOR) PROCEDURE: Primary | ICD-10-CM

## 2024-04-16 DIAGNOSIS — I50.20 HEART FAILURE WITH REDUCED EJECTION FRACTION, NYHA CLASS III (H): ICD-10-CM

## 2024-04-16 DIAGNOSIS — I42.8 NONISCHEMIC CARDIOMYOPATHY (H): ICD-10-CM

## 2024-04-16 DIAGNOSIS — I42.8 NON-ISCHEMIC CARDIOMYOPATHY (H): ICD-10-CM

## 2024-04-16 DIAGNOSIS — I47.29 NSVT (NONSUSTAINED VENTRICULAR TACHYCARDIA) (H): ICD-10-CM

## 2024-04-16 DIAGNOSIS — R06.02 SHORTNESS OF BREATH: ICD-10-CM

## 2024-04-16 LAB
ANION GAP SERPL CALCULATED.3IONS-SCNC: 10 MMOL/L (ref 7–15)
BUN SERPL-MCNC: 23.8 MG/DL (ref 8–23)
CALCIUM SERPL-MCNC: 9.4 MG/DL (ref 8.8–10.2)
CHLORIDE SERPL-SCNC: 104 MMOL/L (ref 98–107)
CREAT SERPL-MCNC: 0.94 MG/DL (ref 0.51–0.95)
DEPRECATED HCO3 PLAS-SCNC: 27 MMOL/L (ref 22–29)
EGFRCR SERPLBLD CKD-EPI 2021: 63 ML/MIN/1.73M2
ERYTHROCYTE [DISTWIDTH] IN BLOOD BY AUTOMATED COUNT: 13.2 % (ref 10–15)
GLUCOSE SERPL-MCNC: 157 MG/DL (ref 70–99)
HCT VFR BLD AUTO: 40.5 % (ref 35–47)
HGB BLD-MCNC: 13.2 G/DL (ref 11.7–15.7)
MCH RBC QN AUTO: 31.5 PG (ref 26.5–33)
MCHC RBC AUTO-ENTMCNC: 32.6 G/DL (ref 31.5–36.5)
MCV RBC AUTO: 97 FL (ref 78–100)
PLATELET # BLD AUTO: 267 10E3/UL (ref 150–450)
POTASSIUM SERPL-SCNC: 4.5 MMOL/L (ref 3.4–5.3)
RBC # BLD AUTO: 4.19 10E6/UL (ref 3.8–5.2)
SODIUM SERPL-SCNC: 141 MMOL/L (ref 135–145)
WBC # BLD AUTO: 6.9 10E3/UL (ref 4–11)

## 2024-04-16 PROCEDURE — C1721 AICD, DUAL CHAMBER: HCPCS | Performed by: INTERNAL MEDICINE

## 2024-04-16 PROCEDURE — C1894 INTRO/SHEATH, NON-LASER: HCPCS | Performed by: INTERNAL MEDICINE

## 2024-04-16 PROCEDURE — 36415 COLL VENOUS BLD VENIPUNCTURE: CPT

## 2024-04-16 PROCEDURE — 999N000065 XR CHEST PORT 1 VIEW

## 2024-04-16 PROCEDURE — 250N000011 HC RX IP 250 OP 636

## 2024-04-16 PROCEDURE — 250N000011 HC RX IP 250 OP 636: Performed by: INTERNAL MEDICINE

## 2024-04-16 PROCEDURE — 99207 CARDIAC DEVICE CHECK - INPATIENT: CPT | Mod: 26 | Performed by: NURSE PRACTITIONER

## 2024-04-16 PROCEDURE — 258N000003 HC RX IP 258 OP 636

## 2024-04-16 PROCEDURE — 99153 MOD SED SAME PHYS/QHP EA: CPT | Performed by: INTERNAL MEDICINE

## 2024-04-16 PROCEDURE — 999N000142 HC STATISTIC PROCEDURE PREP ONLY

## 2024-04-16 PROCEDURE — 71045 X-RAY EXAM CHEST 1 VIEW: CPT | Mod: 26 | Performed by: RADIOLOGY

## 2024-04-16 PROCEDURE — 80048 BASIC METABOLIC PNL TOTAL CA: CPT

## 2024-04-16 PROCEDURE — 999N000064 CARDIAC DEVICE CHECK - INPATIENT

## 2024-04-16 PROCEDURE — 93005 ELECTROCARDIOGRAM TRACING: CPT

## 2024-04-16 PROCEDURE — 85041 AUTOMATED RBC COUNT: CPT

## 2024-04-16 PROCEDURE — 99152 MOD SED SAME PHYS/QHP 5/>YRS: CPT | Performed by: INTERNAL MEDICINE

## 2024-04-16 PROCEDURE — C1779 LEAD, PMKR, TRANSVENOUS VDD: HCPCS | Performed by: INTERNAL MEDICINE

## 2024-04-16 PROCEDURE — C1898 LEAD, PMKR, OTHER THAN TRANS: HCPCS | Performed by: INTERNAL MEDICINE

## 2024-04-16 PROCEDURE — 999N000133 HC STATISTIC PP CARE STAGE 2

## 2024-04-16 PROCEDURE — 33249 INSJ/RPLCMT DEFIB W/LEAD(S): CPT | Performed by: INTERNAL MEDICINE

## 2024-04-16 PROCEDURE — 93010 ELECTROCARDIOGRAM REPORT: CPT | Mod: XU | Performed by: INTERNAL MEDICINE

## 2024-04-16 PROCEDURE — 250N000009 HC RX 250: Performed by: INTERNAL MEDICINE

## 2024-04-16 PROCEDURE — 999N000054 HC STATISTIC EKG NON-CHARGEABLE

## 2024-04-16 PROCEDURE — 93280 PM DEVICE PROGR EVAL DUAL: CPT

## 2024-04-16 PROCEDURE — 272N000001 HC OR GENERAL SUPPLY STERILE: Performed by: INTERNAL MEDICINE

## 2024-04-16 DEVICE — DEFIB ICD COBALT XT IMPLANTABLE DDPA2D4: Type: IMPLANTABLE DEVICE | Status: FUNCTIONAL

## 2024-04-16 DEVICE — LEAD SPRINT QUATTRO SECURE S 55 6935M55: Type: IMPLANTABLE DEVICE | Status: FUNCTIONAL

## 2024-04-16 DEVICE — IMP LEAD PACING BIPOLAR CAPSUREFIX NOVUS 45CM 5076-45: Type: IMPLANTABLE DEVICE | Status: FUNCTIONAL

## 2024-04-16 RX ORDER — FENTANYL CITRATE 50 UG/ML
INJECTION, SOLUTION INTRAMUSCULAR; INTRAVENOUS
Status: DISCONTINUED | OUTPATIENT
Start: 2024-04-16 | End: 2024-04-16 | Stop reason: HOSPADM

## 2024-04-16 RX ORDER — OXYCODONE AND ACETAMINOPHEN 5; 325 MG/1; MG/1
1 TABLET ORAL EVERY 4 HOURS PRN
Status: DISCONTINUED | OUTPATIENT
Start: 2024-04-16 | End: 2024-04-16 | Stop reason: HOSPADM

## 2024-04-16 RX ORDER — SPIRONOLACTONE 25 MG/1
25 TABLET ORAL DAILY
Status: CANCELLED | OUTPATIENT
Start: 2024-04-16

## 2024-04-16 RX ORDER — NALOXONE HYDROCHLORIDE 0.4 MG/ML
0.4 INJECTION, SOLUTION INTRAMUSCULAR; INTRAVENOUS; SUBCUTANEOUS
Status: DISCONTINUED | OUTPATIENT
Start: 2024-04-16 | End: 2024-04-16 | Stop reason: HOSPADM

## 2024-04-16 RX ORDER — LIDOCAINE 40 MG/G
CREAM TOPICAL
Status: DISCONTINUED | OUTPATIENT
Start: 2024-04-16 | End: 2024-04-16 | Stop reason: HOSPADM

## 2024-04-16 RX ORDER — NALOXONE HYDROCHLORIDE 0.4 MG/ML
0.2 INJECTION, SOLUTION INTRAMUSCULAR; INTRAVENOUS; SUBCUTANEOUS
Status: DISCONTINUED | OUTPATIENT
Start: 2024-04-16 | End: 2024-04-16 | Stop reason: HOSPADM

## 2024-04-16 RX ORDER — CEPHALEXIN 500 MG/1
500 TABLET ORAL 3 TIMES DAILY
Qty: 15 TABLET | Refills: 0 | Status: SHIPPED | OUTPATIENT
Start: 2024-04-16 | End: 2024-04-21

## 2024-04-16 RX ORDER — SODIUM CHLORIDE 9 MG/ML
INJECTION, SOLUTION INTRAVENOUS CONTINUOUS
Status: DISCONTINUED | OUTPATIENT
Start: 2024-04-16 | End: 2024-04-16 | Stop reason: HOSPADM

## 2024-04-16 RX ORDER — ATORVASTATIN CALCIUM 20 MG/1
20 TABLET, FILM COATED ORAL DAILY
Status: CANCELLED | OUTPATIENT
Start: 2024-04-16

## 2024-04-16 RX ORDER — LIDOCAINE HYDROCHLORIDE 20 MG/ML
INJECTION, SOLUTION INFILTRATION; PERINEURAL
Status: DISCONTINUED | OUTPATIENT
Start: 2024-04-16 | End: 2024-04-16 | Stop reason: HOSPADM

## 2024-04-16 RX ORDER — ACETAMINOPHEN AND CODEINE PHOSPHATE 300; 30 MG/1; MG/1
1 TABLET ORAL EVERY 4 HOURS PRN
Qty: 12 TABLET | Refills: 0 | Status: SHIPPED | OUTPATIENT
Start: 2024-04-16

## 2024-04-16 RX ORDER — CEFAZOLIN SODIUM 2 G/100ML
2 INJECTION, SOLUTION INTRAVENOUS
Status: COMPLETED | OUTPATIENT
Start: 2024-04-16 | End: 2024-04-16

## 2024-04-16 RX ADMIN — CEFAZOLIN SODIUM 2 G: 10 INJECTION, POWDER, FOR SOLUTION INTRAVENOUS at 12:47

## 2024-04-16 RX ADMIN — SODIUM CHLORIDE: 900 INJECTION, SOLUTION INTRAVENOUS at 11:40

## 2024-04-16 ASSESSMENT — ACTIVITIES OF DAILY LIVING (ADL)
ADLS_ACUITY_SCORE: 35

## 2024-04-16 NOTE — DISCHARGE INSTRUCTIONS
Home Care after an ICD implant    Wound care:  Keep your incision (surgery wound) dry for 3 days.  After 3 days, you may remove the outer bandage.  Keep the strips of tape on.  They will be removed at your clinic visit.  Check for signs of infection each day.  These include increased redness, swelling, drainage or a fever over 101 F (38.3 C).  Call us immediately if you see any of these signs.  If there are no signs of infection, you may shower in 3 days.  Do not submerge the incision (in a bath tub, hot tub, or swimming pool) until fully healed.  Pain:   You may have mild to moderate pain for 3 to 5 days.  Take acetaminophen (Tylenol) or ibuprofen (Advil) for the pain.  Call us if the pain is severe or lasts more than 5 days.    Activity:  You should slowly go back to your normal activities after 24 hours.  Healing will take 4 to 6 weeks.  No driving for 3 days  Avoid climbing a ladder alone.  It is best to stay within 4 feet of the ground.  Avoid anything that may cause rough contact or a hard hit to your chest.  This includes football, hockey, and other contact sports.  Do not go swimming or boating alone.      For at least 4 weeks:  Do not raise your affected arm above your shoulder.  Do not use your affected arm to push, pull, or lift anything over 10 pounds.  Avoid repetitive upper body activities for 6 weeks (ie: golf, swimming, and weight lifting)    Telling others about your device:  Before you have any medical tests or treatments, tell the doctors, dentists, and other care providers about your device.  There are a few tests and treatments that may interfere with your device.  (These include MRI, radiation therapy, electrocautery, and others.)  Your care team may need to take special steps to keep you safe.  Before you leave the hospital, you will receive a temporary ID card.  A permanent card will be mailed to you about 6 to 8 weeks later.  Always carry the ID card with you.  It has important details  about your device.  You should also get a MedicAlert ID.  Please ask us for a MedicAlert brochure, or go to [email or web address].    Safety near electrical equipment:  All of these are safe to use when in good repair:  Microwaves  Radios  Cordless phone  Remote controls  Small electrical tools  Cell phones: Keep cell phones at least 6 inches from your device.  Do not carry the phone in a pocket near your device.  Security shelby: It is okay to walk through security shelby at the airports and department stores.  Tell airport security that you have a defibrillator.  They should keep the screening wand at least 6 inches from your device.  Full-body scanners are safe.    Avoid the following:   MRI tests in the hospital unless you have a MRI safe defibrillator.   Arc welding, chain saws and high-powered industrial or commercial tools.   Power lines, power plants and large power generators.   Electric body fat scales.   Magnetic mattress pads or pillow.    What to do after a shock from your ICD:  Stop what you re doing and rest.  If you feel fine before and after the shock, call the device clinic.  If you feel unwell or receive more than one shock, call 911 or go to the emergency room.  A shock could mean that your condition has changed and you may need to see a doctor.    Follow-Up Visits:  Return to the clinic in 7 to 10 days to have your device and wound checked.    Device follow-up after your initial clinic visit will take place every 3 months and as needed until your battery reaches end of service. The device follow up will take place in person or remotely utilizing your home monitor.    Questions?  Please call [facility]   Device Nurse:          Business Hours:  [phone]    After Hours:  [phone]   Choose option 4, then ask for the on-call device nurse at job code [id number].    Your next device clinic appointment is scheduled on:    4/23/24 at 11:00 AM.                                                  [facility] - [address]  [address] [place]  [address]

## 2024-04-16 NOTE — PROGRESS NOTES
Klaudia EP AILYN notified that post procedure CXR completed. Per Klaudia, pt ok to discharge based on CXR. Pt tolerated ambulation around unit. Able to void without complications. Family getting prescriptions at pharmacy. Procedure site for pt remains unchanged; stable. Pt meets discharge criteria.

## 2024-04-16 NOTE — PROGRESS NOTES
Pt arrived via cart with RN from [facility] s/p new ICD. VSS. Left upper chest site CDI, no hematoma. Patient denies pain.  family at bedside.

## 2024-04-17 LAB
ATRIAL RATE - MUSE: 63 BPM
ATRIAL RATE - MUSE: 69 BPM
DIASTOLIC BLOOD PRESSURE - MUSE: NORMAL MMHG
DIASTOLIC BLOOD PRESSURE - MUSE: NORMAL MMHG
INTERPRETATION ECG - MUSE: NORMAL
INTERPRETATION ECG - MUSE: NORMAL
MDC_IDC_LEAD_CONNECTION_STATUS: NORMAL
MDC_IDC_LEAD_CONNECTION_STATUS: NORMAL
MDC_IDC_LEAD_IMPLANT_DT: NORMAL
MDC_IDC_LEAD_IMPLANT_DT: NORMAL
MDC_IDC_LEAD_LOCATION: NORMAL
MDC_IDC_LEAD_LOCATION: NORMAL
MDC_IDC_LEAD_LOCATION_DETAIL_1: NORMAL
MDC_IDC_LEAD_LOCATION_DETAIL_1: NORMAL
MDC_IDC_LEAD_MFG: NORMAL
MDC_IDC_LEAD_MFG: NORMAL
MDC_IDC_LEAD_MODEL: NORMAL
MDC_IDC_LEAD_MODEL: NORMAL
MDC_IDC_LEAD_POLARITY_TYPE: NORMAL
MDC_IDC_LEAD_POLARITY_TYPE: NORMAL
MDC_IDC_LEAD_SERIAL: NORMAL
MDC_IDC_LEAD_SERIAL: NORMAL
MDC_IDC_LEAD_SPECIAL_FUNCTION: NORMAL
MDC_IDC_LEAD_SPECIAL_FUNCTION: NORMAL
MDC_IDC_MSMT_BATTERY_DTM: NORMAL
MDC_IDC_MSMT_BATTERY_RRT_TRIGGER: NORMAL
MDC_IDC_MSMT_BATTERY_VOLTAGE: 3.14 V
MDC_IDC_MSMT_CAP_CHARGE_ENERGY: 40 J
MDC_IDC_MSMT_CAP_CHARGE_TIME: 0 S
MDC_IDC_MSMT_CAP_CHARGE_TYPE: NORMAL
MDC_IDC_MSMT_LEADCHNL_RA_IMPEDANCE_VALUE: 608 OHM
MDC_IDC_MSMT_LEADCHNL_RA_PACING_THRESHOLD_AMPLITUDE: 0.5 V
MDC_IDC_MSMT_LEADCHNL_RA_PACING_THRESHOLD_PULSEWIDTH: 0.4 MS
MDC_IDC_MSMT_LEADCHNL_RA_SENSING_INTR_AMPL: 3.5 MV
MDC_IDC_MSMT_LEADCHNL_RV_IMPEDANCE_VALUE: 532 OHM
MDC_IDC_MSMT_LEADCHNL_RV_IMPEDANCE_VALUE: 646 OHM
MDC_IDC_MSMT_LEADCHNL_RV_PACING_THRESHOLD_AMPLITUDE: 0.5 V
MDC_IDC_MSMT_LEADCHNL_RV_PACING_THRESHOLD_PULSEWIDTH: 0.4 MS
MDC_IDC_MSMT_LEADCHNL_RV_SENSING_INTR_AMPL: 16.6 MV
MDC_IDC_PG_IMPLANT_DTM: NORMAL
MDC_IDC_PG_MFG: NORMAL
MDC_IDC_PG_MODEL: NORMAL
MDC_IDC_PG_SERIAL: NORMAL
MDC_IDC_PG_TYPE: NORMAL
MDC_IDC_SESS_CLINIC_NAME: NORMAL
MDC_IDC_SESS_DTM: NORMAL
MDC_IDC_SESS_TYPE: NORMAL
MDC_IDC_SET_BRADY_AT_MODE_SWITCH_RATE: 171 {BEATS}/MIN
MDC_IDC_SET_BRADY_LOWRATE: 50 {BEATS}/MIN
MDC_IDC_SET_BRADY_MAX_SENSOR_RATE: 130 {BEATS}/MIN
MDC_IDC_SET_BRADY_MAX_TRACKING_RATE: 130 {BEATS}/MIN
MDC_IDC_SET_BRADY_MODE: NORMAL
MDC_IDC_SET_BRADY_PAV_DELAY_LOW: 180 MS
MDC_IDC_SET_BRADY_SAV_DELAY_LOW: 150 MS
MDC_IDC_SET_LEADCHNL_RA_PACING_AMPLITUDE: 3.5 V
MDC_IDC_SET_LEADCHNL_RA_PACING_ANODE_ELECTRODE_1: NORMAL
MDC_IDC_SET_LEADCHNL_RA_PACING_ANODE_LOCATION_1: NORMAL
MDC_IDC_SET_LEADCHNL_RA_PACING_CAPTURE_MODE: NORMAL
MDC_IDC_SET_LEADCHNL_RA_PACING_CATHODE_ELECTRODE_1: NORMAL
MDC_IDC_SET_LEADCHNL_RA_PACING_CATHODE_LOCATION_1: NORMAL
MDC_IDC_SET_LEADCHNL_RA_PACING_POLARITY: NORMAL
MDC_IDC_SET_LEADCHNL_RA_PACING_PULSEWIDTH: 0.4 MS
MDC_IDC_SET_LEADCHNL_RA_SENSING_ANODE_ELECTRODE_1: NORMAL
MDC_IDC_SET_LEADCHNL_RA_SENSING_ANODE_LOCATION_1: NORMAL
MDC_IDC_SET_LEADCHNL_RA_SENSING_CATHODE_ELECTRODE_1: NORMAL
MDC_IDC_SET_LEADCHNL_RA_SENSING_CATHODE_LOCATION_1: NORMAL
MDC_IDC_SET_LEADCHNL_RA_SENSING_POLARITY: NORMAL
MDC_IDC_SET_LEADCHNL_RA_SENSING_SENSITIVITY: 0.3 MV
MDC_IDC_SET_LEADCHNL_RV_PACING_AMPLITUDE: 3.5 V
MDC_IDC_SET_LEADCHNL_RV_PACING_ANODE_ELECTRODE_1: NORMAL
MDC_IDC_SET_LEADCHNL_RV_PACING_ANODE_LOCATION_1: NORMAL
MDC_IDC_SET_LEADCHNL_RV_PACING_CAPTURE_MODE: NORMAL
MDC_IDC_SET_LEADCHNL_RV_PACING_CATHODE_ELECTRODE_1: NORMAL
MDC_IDC_SET_LEADCHNL_RV_PACING_CATHODE_LOCATION_1: NORMAL
MDC_IDC_SET_LEADCHNL_RV_PACING_POLARITY: NORMAL
MDC_IDC_SET_LEADCHNL_RV_PACING_PULSEWIDTH: 0.4 MS
MDC_IDC_SET_LEADCHNL_RV_SENSING_ANODE_ELECTRODE_1: NORMAL
MDC_IDC_SET_LEADCHNL_RV_SENSING_ANODE_LOCATION_1: NORMAL
MDC_IDC_SET_LEADCHNL_RV_SENSING_CATHODE_ELECTRODE_1: NORMAL
MDC_IDC_SET_LEADCHNL_RV_SENSING_CATHODE_LOCATION_1: NORMAL
MDC_IDC_SET_LEADCHNL_RV_SENSING_POLARITY: NORMAL
MDC_IDC_SET_LEADCHNL_RV_SENSING_SENSITIVITY: 0.3 MV
MDC_IDC_SET_ZONE_DETECTION_BEATS_DENOMINATOR: 16 {BEATS}
MDC_IDC_SET_ZONE_DETECTION_BEATS_DENOMINATOR: 32 {BEATS}
MDC_IDC_SET_ZONE_DETECTION_BEATS_DENOMINATOR: 40 {BEATS}
MDC_IDC_SET_ZONE_DETECTION_BEATS_NUMERATOR: 16 {BEATS}
MDC_IDC_SET_ZONE_DETECTION_BEATS_NUMERATOR: 30 {BEATS}
MDC_IDC_SET_ZONE_DETECTION_BEATS_NUMERATOR: 32 {BEATS}
MDC_IDC_SET_ZONE_DETECTION_INTERVAL: 250 MS
MDC_IDC_SET_ZONE_DETECTION_INTERVAL: 300 MS
MDC_IDC_SET_ZONE_DETECTION_INTERVAL: 350 MS
MDC_IDC_SET_ZONE_DETECTION_INTERVAL: 360 MS
MDC_IDC_SET_ZONE_DETECTION_INTERVAL: 360 MS
MDC_IDC_SET_ZONE_STATUS: NORMAL
MDC_IDC_SET_ZONE_TYPE: NORMAL
MDC_IDC_SET_ZONE_VENDOR_TYPE: NORMAL
MDC_IDC_STAT_AT_BURDEN_PERCENT: 0 %
MDC_IDC_STAT_AT_DTM_END: NORMAL
MDC_IDC_STAT_AT_DTM_START: NORMAL
MDC_IDC_STAT_BRADY_AP_VP_PERCENT: 0.04 %
MDC_IDC_STAT_BRADY_AP_VS_PERCENT: 1.49 %
MDC_IDC_STAT_BRADY_AS_VP_PERCENT: 35.17 %
MDC_IDC_STAT_BRADY_AS_VS_PERCENT: 63.3 %
MDC_IDC_STAT_BRADY_DTM_END: NORMAL
MDC_IDC_STAT_BRADY_DTM_START: NORMAL
MDC_IDC_STAT_BRADY_RA_PERCENT_PACED: 1.87 %
MDC_IDC_STAT_BRADY_RV_PERCENT_PACED: 35.21 %
MDC_IDC_STAT_CRT_DTM_END: NORMAL
MDC_IDC_STAT_CRT_DTM_START: NORMAL
MDC_IDC_STAT_EPISODE_RECENT_COUNT: 0
MDC_IDC_STAT_EPISODE_RECENT_COUNT_DTM_END: NORMAL
MDC_IDC_STAT_EPISODE_RECENT_COUNT_DTM_START: NORMAL
MDC_IDC_STAT_EPISODE_TOTAL_COUNT: 0
MDC_IDC_STAT_EPISODE_TOTAL_COUNT_DTM_END: NORMAL
MDC_IDC_STAT_EPISODE_TOTAL_COUNT_DTM_START: NORMAL
MDC_IDC_STAT_EPISODE_TYPE: NORMAL
MDC_IDC_STAT_TACHYTHERAPY_ATP_DELIVERED_RECENT: 0
MDC_IDC_STAT_TACHYTHERAPY_ATP_DELIVERED_TOTAL: 0
MDC_IDC_STAT_TACHYTHERAPY_RECENT_DTM_END: NORMAL
MDC_IDC_STAT_TACHYTHERAPY_RECENT_DTM_START: NORMAL
MDC_IDC_STAT_TACHYTHERAPY_SHOCKS_ABORTED_RECENT: 0
MDC_IDC_STAT_TACHYTHERAPY_SHOCKS_ABORTED_TOTAL: 0
MDC_IDC_STAT_TACHYTHERAPY_SHOCKS_DELIVERED_RECENT: 0
MDC_IDC_STAT_TACHYTHERAPY_SHOCKS_DELIVERED_TOTAL: 0
MDC_IDC_STAT_TACHYTHERAPY_TOTAL_DTM_END: NORMAL
MDC_IDC_STAT_TACHYTHERAPY_TOTAL_DTM_START: NORMAL
P AXIS - MUSE: 63 DEGREES
P AXIS - MUSE: 70 DEGREES
PR INTERVAL - MUSE: 140 MS
PR INTERVAL - MUSE: 156 MS
QRS DURATION - MUSE: 120 MS
QRS DURATION - MUSE: 124 MS
QT - MUSE: 458 MS
QT - MUSE: 462 MS
QTC - MUSE: 468 MS
QTC - MUSE: 495 MS
R AXIS - MUSE: -48 DEGREES
R AXIS - MUSE: -50 DEGREES
SYSTOLIC BLOOD PRESSURE - MUSE: NORMAL MMHG
SYSTOLIC BLOOD PRESSURE - MUSE: NORMAL MMHG
T AXIS - MUSE: -28 DEGREES
T AXIS - MUSE: -55 DEGREES
VENTRICULAR RATE- MUSE: 63 BPM
VENTRICULAR RATE- MUSE: 69 BPM

## 2024-04-23 ENCOUNTER — ANCILLARY PROCEDURE (OUTPATIENT)
Dept: CARDIOLOGY | Facility: CLINIC | Age: 74
End: 2024-04-23
Payer: MEDICARE

## 2024-04-23 DIAGNOSIS — Z95.810 CARDIAC RESYNCHRONIZATION THERAPY DEFIBRILLATOR (CRT-D) IN PLACE: ICD-10-CM

## 2024-04-23 PROCEDURE — 93283 PRGRMG EVAL IMPLANTABLE DFB: CPT | Performed by: INTERNAL MEDICINE

## 2024-04-23 NOTE — PATIENT INSTRUCTIONS
It was a pleasure to see you in clinic today, please do not hesitate to call us for questions or concerns.  We will see you back in clinic on 7/18/24 for your 3 month visit.    Klaudia Moore RN    Electrophysiology Nurse Clinician  [facility]    During Business Hours Please Call:  [phone]  After Hours Please Call:  [phone] - select option #4 and ask for job code [id number]

## 2024-04-24 LAB
MDC_IDC_LEAD_CONNECTION_STATUS: NORMAL
MDC_IDC_LEAD_CONNECTION_STATUS: NORMAL
MDC_IDC_LEAD_IMPLANT_DT: NORMAL
MDC_IDC_LEAD_IMPLANT_DT: NORMAL
MDC_IDC_LEAD_LOCATION: NORMAL
MDC_IDC_LEAD_LOCATION: NORMAL
MDC_IDC_LEAD_LOCATION_DETAIL_1: NORMAL
MDC_IDC_LEAD_LOCATION_DETAIL_1: NORMAL
MDC_IDC_LEAD_MFG: NORMAL
MDC_IDC_LEAD_MFG: NORMAL
MDC_IDC_LEAD_MODEL: NORMAL
MDC_IDC_LEAD_MODEL: NORMAL
MDC_IDC_LEAD_POLARITY_TYPE: NORMAL
MDC_IDC_LEAD_POLARITY_TYPE: NORMAL
MDC_IDC_LEAD_SERIAL: NORMAL
MDC_IDC_LEAD_SERIAL: NORMAL
MDC_IDC_LEAD_SPECIAL_FUNCTION: NORMAL
MDC_IDC_LEAD_SPECIAL_FUNCTION: NORMAL
MDC_IDC_MSMT_BATTERY_DTM: NORMAL
MDC_IDC_MSMT_BATTERY_REMAINING_LONGEVITY: 156 MO
MDC_IDC_MSMT_BATTERY_RRT_TRIGGER: NORMAL
MDC_IDC_MSMT_BATTERY_STATUS: NORMAL
MDC_IDC_MSMT_BATTERY_VOLTAGE: 3.16 V
MDC_IDC_MSMT_CAP_CHARGE_ENERGY: 40 J
MDC_IDC_MSMT_CAP_CHARGE_TIME: 0 S
MDC_IDC_MSMT_CAP_CHARGE_TYPE: NORMAL
MDC_IDC_MSMT_LEADCHNL_RA_IMPEDANCE_VALUE: 475 OHM
MDC_IDC_MSMT_LEADCHNL_RA_PACING_THRESHOLD_AMPLITUDE: 0.5 V
MDC_IDC_MSMT_LEADCHNL_RA_PACING_THRESHOLD_PULSEWIDTH: 0.4 MS
MDC_IDC_MSMT_LEADCHNL_RA_SENSING_INTR_AMPL: 4.3 MV
MDC_IDC_MSMT_LEADCHNL_RV_IMPEDANCE_VALUE: 380 OHM
MDC_IDC_MSMT_LEADCHNL_RV_IMPEDANCE_VALUE: 475 OHM
MDC_IDC_MSMT_LEADCHNL_RV_PACING_THRESHOLD_AMPLITUDE: 0.5 V
MDC_IDC_MSMT_LEADCHNL_RV_PACING_THRESHOLD_PULSEWIDTH: 0.4 MS
MDC_IDC_MSMT_LEADCHNL_RV_SENSING_INTR_AMPL: 20.6 MV
MDC_IDC_PG_IMPLANT_DTM: NORMAL
MDC_IDC_PG_MFG: NORMAL
MDC_IDC_PG_MODEL: NORMAL
MDC_IDC_PG_SERIAL: NORMAL
MDC_IDC_PG_TYPE: NORMAL
MDC_IDC_SESS_CLINIC_NAME: NORMAL
MDC_IDC_SESS_DTM: NORMAL
MDC_IDC_SESS_TYPE: NORMAL
MDC_IDC_SET_BRADY_AT_MODE_SWITCH_RATE: 171 {BEATS}/MIN
MDC_IDC_SET_BRADY_LOWRATE: 50 {BEATS}/MIN
MDC_IDC_SET_BRADY_MAX_SENSOR_RATE: 130 {BEATS}/MIN
MDC_IDC_SET_BRADY_MAX_TRACKING_RATE: 130 {BEATS}/MIN
MDC_IDC_SET_BRADY_MODE: NORMAL
MDC_IDC_SET_BRADY_PAV_DELAY_LOW: 180 MS
MDC_IDC_SET_BRADY_SAV_DELAY_LOW: 150 MS
MDC_IDC_SET_LEADCHNL_RA_PACING_AMPLITUDE: 3.5 V
MDC_IDC_SET_LEADCHNL_RA_PACING_ANODE_ELECTRODE_1: NORMAL
MDC_IDC_SET_LEADCHNL_RA_PACING_ANODE_LOCATION_1: NORMAL
MDC_IDC_SET_LEADCHNL_RA_PACING_CAPTURE_MODE: NORMAL
MDC_IDC_SET_LEADCHNL_RA_PACING_CATHODE_ELECTRODE_1: NORMAL
MDC_IDC_SET_LEADCHNL_RA_PACING_CATHODE_LOCATION_1: NORMAL
MDC_IDC_SET_LEADCHNL_RA_PACING_POLARITY: NORMAL
MDC_IDC_SET_LEADCHNL_RA_PACING_PULSEWIDTH: 0.4 MS
MDC_IDC_SET_LEADCHNL_RA_SENSING_ANODE_ELECTRODE_1: NORMAL
MDC_IDC_SET_LEADCHNL_RA_SENSING_ANODE_LOCATION_1: NORMAL
MDC_IDC_SET_LEADCHNL_RA_SENSING_CATHODE_ELECTRODE_1: NORMAL
MDC_IDC_SET_LEADCHNL_RA_SENSING_CATHODE_LOCATION_1: NORMAL
MDC_IDC_SET_LEADCHNL_RA_SENSING_POLARITY: NORMAL
MDC_IDC_SET_LEADCHNL_RA_SENSING_SENSITIVITY: 0.3 MV
MDC_IDC_SET_LEADCHNL_RV_PACING_AMPLITUDE: 3.5 V
MDC_IDC_SET_LEADCHNL_RV_PACING_ANODE_ELECTRODE_1: NORMAL
MDC_IDC_SET_LEADCHNL_RV_PACING_ANODE_LOCATION_1: NORMAL
MDC_IDC_SET_LEADCHNL_RV_PACING_CAPTURE_MODE: NORMAL
MDC_IDC_SET_LEADCHNL_RV_PACING_CATHODE_ELECTRODE_1: NORMAL
MDC_IDC_SET_LEADCHNL_RV_PACING_CATHODE_LOCATION_1: NORMAL
MDC_IDC_SET_LEADCHNL_RV_PACING_POLARITY: NORMAL
MDC_IDC_SET_LEADCHNL_RV_PACING_PULSEWIDTH: 0.4 MS
MDC_IDC_SET_LEADCHNL_RV_SENSING_ANODE_ELECTRODE_1: NORMAL
MDC_IDC_SET_LEADCHNL_RV_SENSING_ANODE_LOCATION_1: NORMAL
MDC_IDC_SET_LEADCHNL_RV_SENSING_CATHODE_ELECTRODE_1: NORMAL
MDC_IDC_SET_LEADCHNL_RV_SENSING_CATHODE_LOCATION_1: NORMAL
MDC_IDC_SET_LEADCHNL_RV_SENSING_POLARITY: NORMAL
MDC_IDC_SET_LEADCHNL_RV_SENSING_SENSITIVITY: 0.3 MV
MDC_IDC_SET_ZONE_DETECTION_BEATS_DENOMINATOR: 16 {BEATS}
MDC_IDC_SET_ZONE_DETECTION_BEATS_DENOMINATOR: 32 {BEATS}
MDC_IDC_SET_ZONE_DETECTION_BEATS_DENOMINATOR: 40 {BEATS}
MDC_IDC_SET_ZONE_DETECTION_BEATS_NUMERATOR: 16 {BEATS}
MDC_IDC_SET_ZONE_DETECTION_BEATS_NUMERATOR: 30 {BEATS}
MDC_IDC_SET_ZONE_DETECTION_BEATS_NUMERATOR: 32 {BEATS}
MDC_IDC_SET_ZONE_DETECTION_INTERVAL: 250 MS
MDC_IDC_SET_ZONE_DETECTION_INTERVAL: 300 MS
MDC_IDC_SET_ZONE_DETECTION_INTERVAL: 350 MS
MDC_IDC_SET_ZONE_DETECTION_INTERVAL: 360 MS
MDC_IDC_SET_ZONE_DETECTION_INTERVAL: 360 MS
MDC_IDC_SET_ZONE_STATUS: NORMAL
MDC_IDC_SET_ZONE_TYPE: NORMAL
MDC_IDC_SET_ZONE_VENDOR_TYPE: NORMAL
MDC_IDC_STAT_AT_BURDEN_PERCENT: 0 %
MDC_IDC_STAT_AT_DTM_END: NORMAL
MDC_IDC_STAT_AT_DTM_START: NORMAL
MDC_IDC_STAT_BRADY_AP_VP_PERCENT: 0 %
MDC_IDC_STAT_BRADY_AP_VS_PERCENT: 0.4 %
MDC_IDC_STAT_BRADY_AS_VP_PERCENT: 0.03 %
MDC_IDC_STAT_BRADY_AS_VS_PERCENT: 99.57 %
MDC_IDC_STAT_BRADY_DTM_END: NORMAL
MDC_IDC_STAT_BRADY_DTM_START: NORMAL
MDC_IDC_STAT_BRADY_RA_PERCENT_PACED: 0.57 %
MDC_IDC_STAT_BRADY_RV_PERCENT_PACED: 0.04 %
MDC_IDC_STAT_CRT_DTM_END: NORMAL
MDC_IDC_STAT_CRT_DTM_START: NORMAL
MDC_IDC_STAT_EPISODE_RECENT_COUNT: 0
MDC_IDC_STAT_EPISODE_RECENT_COUNT_DTM_END: NORMAL
MDC_IDC_STAT_EPISODE_RECENT_COUNT_DTM_START: NORMAL
MDC_IDC_STAT_EPISODE_TOTAL_COUNT: 0
MDC_IDC_STAT_EPISODE_TOTAL_COUNT_DTM_END: NORMAL
MDC_IDC_STAT_EPISODE_TOTAL_COUNT_DTM_START: NORMAL
MDC_IDC_STAT_EPISODE_TYPE: NORMAL
MDC_IDC_STAT_TACHYTHERAPY_ATP_DELIVERED_RECENT: 0
MDC_IDC_STAT_TACHYTHERAPY_ATP_DELIVERED_TOTAL: 0
MDC_IDC_STAT_TACHYTHERAPY_RECENT_DTM_END: NORMAL
MDC_IDC_STAT_TACHYTHERAPY_RECENT_DTM_START: NORMAL
MDC_IDC_STAT_TACHYTHERAPY_SHOCKS_ABORTED_RECENT: 0
MDC_IDC_STAT_TACHYTHERAPY_SHOCKS_ABORTED_TOTAL: 0
MDC_IDC_STAT_TACHYTHERAPY_SHOCKS_DELIVERED_RECENT: 0
MDC_IDC_STAT_TACHYTHERAPY_SHOCKS_DELIVERED_TOTAL: 0
MDC_IDC_STAT_TACHYTHERAPY_TOTAL_DTM_END: NORMAL
MDC_IDC_STAT_TACHYTHERAPY_TOTAL_DTM_START: NORMAL

## 2024-05-07 ENCOUNTER — TELEPHONE (OUTPATIENT)
Dept: CARDIOLOGY | Facility: CLINIC | Age: 74
End: 2024-05-07
Payer: MEDICARE

## 2024-05-07 NOTE — TELEPHONE ENCOUNTER
Patient Contacted for the patient to call back and schedule the following:    Appointment type: rtn hf   Provider: roger   Return date: 08/27/24  Specialty phone number: [phone] opt 1  Additional appointment(s) needed: labs    Additonal Notes: n/a

## 2024-06-01 ENCOUNTER — TRANSFERRED RECORDS (OUTPATIENT)
Dept: MULTI SPECIALTY CLINIC | Facility: CLINIC | Age: 74
End: 2024-06-01

## 2024-06-01 LAB — RETINOPATHY: NORMAL

## 2024-07-09 DIAGNOSIS — I10 BENIGN ESSENTIAL HYPERTENSION: ICD-10-CM

## 2024-07-09 DIAGNOSIS — I50.20 HEART FAILURE WITH REDUCED EJECTION FRACTION, NYHA CLASS III (H): ICD-10-CM

## 2024-07-09 RX ORDER — SACUBITRIL AND VALSARTAN 49; 51 MG/1; MG/1
1 TABLET, FILM COATED ORAL 2 TIMES DAILY
Qty: 180 TABLET | Refills: 3 | Status: SHIPPED | OUTPATIENT
Start: 2024-07-09

## 2024-07-09 RX ORDER — SACUBITRIL AND VALSARTAN 49; 51 MG/1; MG/1
1 TABLET, FILM COATED ORAL 2 TIMES DAILY
Qty: 180 TABLET | Refills: 3 | Status: SHIPPED | OUTPATIENT
Start: 2024-07-09 | End: 2024-07-09

## 2024-07-15 DIAGNOSIS — I42.8 NONISCHEMIC CARDIOMYOPATHY (H): ICD-10-CM

## 2024-07-15 DIAGNOSIS — I49.3 PVC'S (PREMATURE VENTRICULAR CONTRACTIONS): ICD-10-CM

## 2024-07-15 DIAGNOSIS — I47.29 PAROXYSMAL VENTRICULAR TACHYCARDIA (H): ICD-10-CM

## 2024-07-15 DIAGNOSIS — I50.20 HEART FAILURE WITH REDUCED EJECTION FRACTION, NYHA CLASS III (H): ICD-10-CM

## 2024-07-15 DIAGNOSIS — E78.2 MIXED HYPERLIPIDEMIA: ICD-10-CM

## 2024-07-15 DIAGNOSIS — I10 BENIGN ESSENTIAL HYPERTENSION: ICD-10-CM

## 2024-07-15 RX ORDER — METOPROLOL SUCCINATE 25 MG/1
37.5 TABLET, EXTENDED RELEASE ORAL DAILY
Qty: 135 TABLET | Refills: 1 | Status: SHIPPED | OUTPATIENT
Start: 2024-07-15 | End: 2024-08-27 | Stop reason: DRUGHIGH

## 2024-07-15 NOTE — TELEPHONE ENCOUNTER
Pending Prescriptions:                       Disp   Refills    metoprolol succinate ER (TOPROL XL) 25 MG*135 ta*1            Sig: Take 1.5 tablets (37.5 mg) by mouth daily      Last visit: 2/6/2024 (Darlene Barahona), last visit with Eden Pugh 12/22/2023    Last filled: 6/1/2024    Quantity: 135    Req. Rc'd: 7/12/2024    Request received by Halie under Eden Pugh in [place]. Request states this is the second request for refill.    NICKOLAS Thompson

## 2024-07-16 ENCOUNTER — CARE COORDINATION (OUTPATIENT)
Dept: CARDIOLOGY | Facility: CLINIC | Age: 74
End: 2024-07-16

## 2024-07-16 NOTE — PROGRESS NOTES
Called and spoke with [employer] who states they will be mailing out the Entresto and it should arrive on 7/18/24. Called and spoke with patient to notify.

## 2024-07-17 NOTE — PROGRESS NOTES
ELECTROPHYSIOLOGY CLINIC VISIT    Assessment/Recommendations   Assessment/Plan:    Kayli Morillo is a 73 year old female with past medical history significant for HFrEF 2/2 NICM s/p ICD 4/16/24, nonobstructive CAD, and type II diabetes.     HFrEF 2/2 NICM LVEF 20-25%, NYHA Symptom Class III Stage C  NSVT   ACE-I/ARB/ARNi: Continue entresto   BB: Continue Toprol XL   Aldosterone antagonist: Continue spironolactone   Fluid status: euvolemic  SCD prophylaxis: LVEF remained <35% despite maximal GDMT. Discussed proceeding with primary prevention defibrillator implant at this time. Patient female, LVEF <35%, -130 ms, discussed CRT vs ICD implant. If QRS duration 130-149 ms, this would be class IIa-b for CRTD implant. If QRS day of procedure <130 ms would proceed with ICD alone. QRS day of implant 124 ms, she underwent dual chamber implant on 4/16/24. Device check today with normal device function, stable lead trends, AP 0.4%,  <0.1%. Her incision site is well healed.    - Continue routine device follow up     Follow up in 1 year      History of Present Illness/Subjective    Ms. Kayli Morillo is a 73 year old female who comes in today for EP follow-up of NICM, NSVT.    She was initially seen by Dr Schilling on 4/24/23. Prior to his she had been admitted to OSH in March with dyspnea and mild peripheral edema. Work up during this admission demonstrated pleural effusion and decreased LVEF. Echo on 3/16/23 revealed decreased LVEF of 14%. A subsequent coronary angiogram revealed nonobstructive CAD. A right heart cath was then done on 3/16 this demonstrated normal filling pressures and borderline normal cardiac output. She then had cardiac MRI done fur further evaluation of decreased LVEF on 5/25 which demonstrated NICM with severe LV dysfunction and normal RV function, LVEF of 19% and RVEF 51%. When she has last seen by the heart failure team, her symptoms had been improving. Dr Schilling had ordered a ziopatch for  further evaluation of arrhythmias that could be contributing to her decreased systolic function. That ziopatch demonstrated sinus rhythm with occasional NSVT runs.      EP Visit 8/1/23: She reports feeling well. She denies chest discomfort, palpitations, abdominal fullness/bloating or peripheral edema, shortness of breath, paroxysmal nocturnal dyspnea, orthopnea, lightheadedness, dizziness, pre-syncope, or syncope. Presenting 12 lead ECG shows sinus with PVCs Vent Rate 68 bpm,  ms,  ms, QTc 440 ms. Current cardiac medications include: ASA, lipitor, toprol XL, entresto and aldactone.     EP Visit 11/2/23: She presents today for follow up. She was seen in follow up by Dr Schilling on 10/17. Prior to visit she had repeat echo done with LVEF 30-35%. She has tolerated GDMT well, at last visit, Dr Schilling increased entresto dose further to 49 mg twice daily. Repeat echo ordered in 3 months. She reports feeling well, she has been able to do chores around the house. Notes considerable improvement in dyspnea since starting GDMT. Blood pressures stable, -110. She denies chest discomfort, peripheral edema, shortness of breath, orthopnea, lightheadedness or dizziness. Current cardiac medications include: lipitor, ASA, entreso, aldactone and metoprolol succinate.     EP Visit 2/6/24: She presents today for follow up. She had repeat echo done on 1/23/24 with LVEF 20-25%. She has been feeling well, able to walk several blocks until developing dyspnea. Reports she has some worsening of GI symptoms on increased entresto dose, is tolerating current dose ok. She chest discomfort, palpitations, abdominal fullness/bloating or peripheral edema, shortness of breath, paroxysmal nocturnal dyspnea, orthopnea, lightheadedness, dizziness, pre-syncope, or syncope. Presenting 12 lead ECG shows sinus Vent Rate 91 bpm,  ms,  ms, QTc 492 ms.     She presents today for follow up. She has been feeling well since implant, no  issues with incision. She has been working on moving, dyspnea has been stable. She notes some fatigue which has been an ongoing issues. Otherwise denies chest discomfort, palpitations, peripheral edema, shortness of breath, orthopnea, lightheadedness, dizziness, pre-syncope, or syncope. Device interrogation shows normal device function, stable lead trends, AP 0.4%,  <0.1%.     I have reviewed and updated the patient's Past Medical History, Social History, Family History and Medication List.     Cardiographics (Personally Reviewed) :   Echo: 1/23/2024   Interpretation Summary  There is mild to moderate left ventricular dilation present and the visual  ejection fraction is 20-25% with moderate diffuce hypokinesis  Grade I or early diastolic dysfunction.  Right ventricular function, chamber size, wall motion, and thickness are  normal.  Moderate mitral insufficiency is present.  The inferior vena cava is normal.  No pericardial effusion is present.  Compared to priro imaging on 10/17/2023 ejection fraction is slightly worse and the mitral regurgitation is slightly  worse on a few images (apical four) but this may be due to incomplete interrogation on prior imaging.    Echo: 10/17/2023   Interpretation Summary  Mild left ventricular dilation is present. Left ventricular function is  decreased. The ejection fraction is 30-35% (moderately reduced). Abnormal  septal motion consistent with left bundle branch block is present. Moderate  diffuse hypokinesis is present.  Global right ventricular function is normal.  Mild to moderate mitral insufficiency is present.  Pulmonary artery systolic pressure is normal.  Estimated mean right atrial pressure is normal.  No pericardial effusion is present.    Echo: 3/16/23  Interpretation Summary  Moderate left ventricular dilation (LVIDd 6.1cm). Severe diffuse hypokinesis.  Biplane LVEF is 14.3%.  The right ventricle is normal size and function.  Moderate mitral insufficiency is  present due to dilated left ventricle.  Moderate pulmonary hypertension. Right ventricular systolic pressure is 52  mmHg.  The inferior vena cava was normal in size with preserved respiratory  variability.  There is no prior study for direct comparison. Dr. Quarles informed of the  findings.     Ziopatch: 4/25/23-5/9/23  Sinus 55 -168 bpm. 79 NSVT runs, avg 127 bpm. 2.6% PVC burden.      Coronary Angiogram/RHC 3/31/23  Right sided filling pressures are normal.  Left sided filling pressures are normal.  Mild elevated pulmonary hypertension.  Normal cardiac output level.  Hemodynamic data has been modified in Epic per physician review.  Mild to moderate non obstructive CAD.  Non ischemic cardiomyopathy.     CMR: 5/25/23  1. The left ventricle is severely dilated. There is increased trabeculations in the left ventricle. There  is severe global hypokinesis. The systolic function severely reduced. The LVEF is traced at 19%.   2. The right ventricle is normal in cavity size. The global systolic function is normal. The RVEF is 51%.   3. The left atrium is severely enlarged. The right atrium is normal in size.   4. There is at least moderate functional mitral regurgitation.  5. There is mild hyperenhancement in the basal septum in the pattern of mid-myocardial stripe consistent  with non ischemic fibrosis.    T1 mapping - ECV 33% (elevated).   T2 mapping - no myocardial edema present.   6. There is trivial pericardial effusion.  7. There is no intracardiac thrombus.  CONCLUSIONS:   Non-ischemic cardiomyopathy with severe LV dysfunction and normal right ventricular function, LVEF of 19%  and RVEF 51%.  No evidence of myocardial edema.        Physical Examination   /69 (BP Location: Right arm, Patient Position: Chair, Cuff Size: Adult Regular)   Pulse 79   Wt 60.8 kg (134 lb)   SpO2 97%   BMI 27.01 kg/m    Wt Readings from Last 3 Encounters:   07/18/24 60.8 kg (134 lb)   04/16/24 59.3 kg (130 lb 11.7 oz)   02/06/24  "59.9 kg (132 lb 1.6 oz)     General Appearance:   Alert, well-appearing and in no acute distress.   HEENT: Atraumatic, normocephalic. MMM.   Chest/Lungs:   Respirations unlabored.  Lungs are clear to auscultation.   Cardiovascular:   Regular rate and rhythm.  S1/S2. No murmur.    Abdomen:  Soft, nontender, nondistended.   Extremities: No cyanosis or clubbing. No edema.    Musculoskeletal: Moves all extremities.     Skin: Warm, dry, intact.    Neurologic: Mood and affect are appropriate.  Alert and oriented to person, place, time, and situation.          Medications  Allergies   Lipitor 20 mg daily   ASA 81 mg daily   Entresto 49-51 bid  Aldactone 25 mg daily   Metoprolol succinate 37.5 mg daily     Metformin   Vitamin D   No Known Allergies      Lab Results (Personally Reviewed)    Chemistry/lipid CBC Cardiac Enzymes/BNP/TSH/INR   Lab Results   Component Value Date    BUN 23.8 (H) 04/16/2024     04/16/2024    CO2 27 04/16/2024     Creatinine   Date Value Ref Range Status   04/16/2024 0.94 0.51 - 0.95 mg/dL Final       No results found for: \"CHOL\", \"HDL\", \"LDL\", \"CHOLHDL\"   Lab Results   Component Value Date    WBC 6.9 04/16/2024    HGB 13.2 04/16/2024    HCT 40.5 04/16/2024    MCV 97 04/16/2024     04/16/2024    Lab Results   Component Value Date    TSH 0.64 03/16/2023    INR 1.01 03/31/2023        The patient states understanding and is agreeable with the plan.     Darlene Barahona PA-C  [facility]  Electrophysiology Consult Service  Pager: [phone]    I spent a total of 20 minutes face to face with Kayli Morillo during today's office visit. I have spent an additional 15 minutes today on chart review and documentation.                   "

## 2024-07-18 ENCOUNTER — ANCILLARY PROCEDURE (OUTPATIENT)
Dept: CARDIOLOGY | Facility: CLINIC | Age: 74
End: 2024-07-18
Payer: MEDICARE

## 2024-07-18 ENCOUNTER — OFFICE VISIT (OUTPATIENT)
Dept: CARDIOLOGY | Facility: CLINIC | Age: 74
End: 2024-07-18
Payer: MEDICARE

## 2024-07-18 VITALS
SYSTOLIC BLOOD PRESSURE: 121 MMHG | OXYGEN SATURATION: 97 % | WEIGHT: 134 LBS | DIASTOLIC BLOOD PRESSURE: 69 MMHG | BODY MASS INDEX: 27.01 KG/M2 | HEART RATE: 79 BPM

## 2024-07-18 DIAGNOSIS — I42.8 NON-ISCHEMIC CARDIOMYOPATHY (H): Primary | ICD-10-CM

## 2024-07-18 DIAGNOSIS — I42.8 NON-ISCHEMIC CARDIOMYOPATHY (H): ICD-10-CM

## 2024-07-18 DIAGNOSIS — Z95.810 CARDIAC RESYNCHRONIZATION THERAPY DEFIBRILLATOR (CRT-D) IN PLACE: ICD-10-CM

## 2024-07-18 LAB — LVEF ECHO: NORMAL

## 2024-07-18 PROCEDURE — 93283 PRGRMG EVAL IMPLANTABLE DFB: CPT | Performed by: INTERNAL MEDICINE

## 2024-07-18 PROCEDURE — 99214 OFFICE O/P EST MOD 30 MIN: CPT

## 2024-07-18 PROCEDURE — G0463 HOSPITAL OUTPT CLINIC VISIT: HCPCS

## 2024-07-18 PROCEDURE — 93306 TTE W/DOPPLER COMPLETE: CPT | Performed by: INTERNAL MEDICINE

## 2024-07-18 ASSESSMENT — PAIN SCALES - GENERAL: PAINLEVEL: NO PAIN (0)

## 2024-07-18 NOTE — NURSING NOTE
Chief Complaint   Patient presents with    Follow Up     3 mo post ICD implant  Device check prior echo prior  Meds- toprol, entresto, spironolactone, statin, asa        Vitals were taken and medications reconciled.    German Nieves, EMT  11:39 AM

## 2024-07-18 NOTE — LETTER
7/18/2024      RE: Kayli Morillo  [address] [facility] [address]       Dear Colleague,    Thank you for the opportunity to participate in the care of your patient, Kayli Morillo, at the [facility] [place] [facility]. Please see a copy of my visit note below.        ELECTROPHYSIOLOGY CLINIC VISIT    Assessment/Recommendations   Assessment/Plan:    Kayli Morillo is a 73 year old female with past medical history significant for HFrEF 2/2 NICM s/p ICD 4/16/24, nonobstructive CAD, and type II diabetes.     HFrEF 2/2 NICM LVEF 20-25%, NYHA Symptom Class III Stage C  NSVT   ACE-I/ARB/ARNi: Continue entresto   BB: Continue Toprol XL   Aldosterone antagonist: Continue spironolactone   Fluid status: euvolemic  SCD prophylaxis: LVEF remained <35% despite maximal GDMT. Discussed proceeding with primary prevention defibrillator implant at this time. Patient female, LVEF <35%, -130 ms, discussed CRT vs ICD implant. If QRS duration 130-149 ms, this would be class IIa-b for CRTD implant. If QRS day of procedure <130 ms would proceed with ICD alone. QRS day of implant 124 ms, she underwent dual chamber implant on 4/16/24. Device check today with normal device function, stable lead trends, AP 0.4%,  <0.1%. Her incision site is well healed.    - Continue routine device follow up     Follow up in 1 year      History of Present Illness/Subjective    Ms. Kayli Morillo is a 73 year old female who comes in today for EP follow-up of NICM, NSVT.    She was initially seen by Dr Schilling on 4/24/23. Prior to his she had been admitted to OSH in March with dyspnea and mild peripheral edema. Work up during this admission demonstrated pleural effusion and decreased LVEF. Echo on 3/16/23 revealed decreased LVEF of 14%. A subsequent coronary angiogram revealed nonobstructive CAD. A right heart cath was then done on 3/16 this demonstrated normal filling  pressures and borderline normal cardiac output. She then had cardiac MRI done fur further evaluation of decreased LVEF on 5/25 which demonstrated NICM with severe LV dysfunction and normal RV function, LVEF of 19% and RVEF 51%. When she has last seen by the heart failure team, her symptoms had been improving. Dr Schilling had ordered a ziopatch for further evaluation of arrhythmias that could be contributing to her decreased systolic function. That ziopatch demonstrated sinus rhythm with occasional NSVT runs.      EP Visit 8/1/23: She reports feeling well. She denies chest discomfort, palpitations, abdominal fullness/bloating or peripheral edema, shortness of breath, paroxysmal nocturnal dyspnea, orthopnea, lightheadedness, dizziness, pre-syncope, or syncope. Presenting 12 lead ECG shows sinus with PVCs Vent Rate 68 bpm,  ms,  ms, QTc 440 ms. Current cardiac medications include: ASA, lipitor, toprol XL, entresto and aldactone.     EP Visit 11/2/23: She presents today for follow up. She was seen in follow up by Dr Schilling on 10/17. Prior to visit she had repeat echo done with LVEF 30-35%. She has tolerated GDMT well, at last visit, Dr Schilling increased entresto dose further to 49 mg twice daily. Repeat echo ordered in 3 months. She reports feeling well, she has been able to do chores around the house. Notes considerable improvement in dyspnea since starting GDMT. Blood pressures stable, -110. She denies chest discomfort, peripheral edema, shortness of breath, orthopnea, lightheadedness or dizziness. Current cardiac medications include: lipitor, ASA, entreso, aldactone and metoprolol succinate.     EP Visit 2/6/24: She presents today for follow up. She had repeat echo done on 1/23/24 with LVEF 20-25%. She has been feeling well, able to walk several blocks until developing dyspnea. Reports she has some worsening of GI symptoms on increased entresto dose, is tolerating current dose ok. She chest discomfort,  palpitations, abdominal fullness/bloating or peripheral edema, shortness of breath, paroxysmal nocturnal dyspnea, orthopnea, lightheadedness, dizziness, pre-syncope, or syncope. Presenting 12 lead ECG shows sinus Vent Rate 91 bpm,  ms,  ms, QTc 492 ms.     She presents today for follow up. She has been feeling well since implant, no issues with incision. She has been working on moving, dyspnea has been stable. She notes some fatigue which has been an ongoing issues. Otherwise denies chest discomfort, palpitations, peripheral edema, shortness of breath, orthopnea, lightheadedness, dizziness, pre-syncope, or syncope. Device interrogation shows normal device function, stable lead trends, AP 0.4%,  <0.1%.     I have reviewed and updated the patient's Past Medical History, Social History, Family History and Medication List.     Cardiographics (Personally Reviewed) :   Echo: 1/23/2024   Interpretation Summary  There is mild to moderate left ventricular dilation present and the visual  ejection fraction is 20-25% with moderate diffuce hypokinesis  Grade I or early diastolic dysfunction.  Right ventricular function, chamber size, wall motion, and thickness are  normal.  Moderate mitral insufficiency is present.  The inferior vena cava is normal.  No pericardial effusion is present.  Compared to priro imaging on 10/17/2023 ejection fraction is slightly worse and the mitral regurgitation is slightly  worse on a few images (apical four) but this may be due to incomplete interrogation on prior imaging.    Echo: 10/17/2023   Interpretation Summary  Mild left ventricular dilation is present. Left ventricular function is  decreased. The ejection fraction is 30-35% (moderately reduced). Abnormal  septal motion consistent with left bundle branch block is present. Moderate  diffuse hypokinesis is present.  Global right ventricular function is normal.  Mild to moderate mitral insufficiency is present.  Pulmonary artery  systolic pressure is normal.  Estimated mean right atrial pressure is normal.  No pericardial effusion is present.    Echo: 3/16/23  Interpretation Summary  Moderate left ventricular dilation (LVIDd 6.1cm). Severe diffuse hypokinesis.  Biplane LVEF is 14.3%.  The right ventricle is normal size and function.  Moderate mitral insufficiency is present due to dilated left ventricle.  Moderate pulmonary hypertension. Right ventricular systolic pressure is 52  mmHg.  The inferior vena cava was normal in size with preserved respiratory  variability.  There is no prior study for direct comparison. Dr. Quarles informed of the  findings.     Ziopatch: 4/25/23-5/9/23  Sinus 55 -168 bpm. 79 NSVT runs, avg 127 bpm. 2.6% PVC burden.      Coronary Angiogram/RHC 3/31/23  Right sided filling pressures are normal.  Left sided filling pressures are normal.  Mild elevated pulmonary hypertension.  Normal cardiac output level.  Hemodynamic data has been modified in Epic per physician review.  Mild to moderate non obstructive CAD.  Non ischemic cardiomyopathy.     CMR: 5/25/23  1. The left ventricle is severely dilated. There is increased trabeculations in the left ventricle. There  is severe global hypokinesis. The systolic function severely reduced. The LVEF is traced at 19%.   2. The right ventricle is normal in cavity size. The global systolic function is normal. The RVEF is 51%.   3. The left atrium is severely enlarged. The right atrium is normal in size.   4. There is at least moderate functional mitral regurgitation.  5. There is mild hyperenhancement in the basal septum in the pattern of mid-myocardial stripe consistent  with non ischemic fibrosis.    T1 mapping - ECV 33% (elevated).   T2 mapping - no myocardial edema present.   6. There is trivial pericardial effusion.  7. There is no intracardiac thrombus.  CONCLUSIONS:   Non-ischemic cardiomyopathy with severe LV dysfunction and normal right ventricular function, LVEF of  "19%  and RVEF 51%.  No evidence of myocardial edema.        Physical Examination   /69 (BP Location: Right arm, Patient Position: Chair, Cuff Size: Adult Regular)   Pulse 79   Wt 60.8 kg (134 lb)   SpO2 97%   BMI 27.01 kg/m    Wt Readings from Last 3 Encounters:   07/18/24 60.8 kg (134 lb)   04/16/24 59.3 kg (130 lb 11.7 oz)   02/06/24 59.9 kg (132 lb 1.6 oz)     General Appearance:   Alert, well-appearing and in no acute distress.   HEENT: Atraumatic, normocephalic. MMM.   Chest/Lungs:   Respirations unlabored.  Lungs are clear to auscultation.   Cardiovascular:   Regular rate and rhythm.  S1/S2. No murmur.    Abdomen:  Soft, nontender, nondistended.   Extremities: No cyanosis or clubbing. No edema.    Musculoskeletal: Moves all extremities.     Skin: Warm, dry, intact.    Neurologic: Mood and affect are appropriate.  Alert and oriented to person, place, time, and situation.          Medications  Allergies   Lipitor 20 mg daily   ASA 81 mg daily   Entresto 49-51 bid  Aldactone 25 mg daily   Metoprolol succinate 37.5 mg daily     Metformin   Vitamin D   No Known Allergies      Lab Results (Personally Reviewed)    Chemistry/lipid CBC Cardiac Enzymes/BNP/TSH/INR   Lab Results   Component Value Date    BUN 23.8 (H) 04/16/2024     04/16/2024    CO2 27 04/16/2024     Creatinine   Date Value Ref Range Status   04/16/2024 0.94 0.51 - 0.95 mg/dL Final       No results found for: \"CHOL\", \"HDL\", \"LDL\", \"CHOLHDL\"   Lab Results   Component Value Date    WBC 6.9 04/16/2024    HGB 13.2 04/16/2024    HCT 40.5 04/16/2024    MCV 97 04/16/2024     04/16/2024    Lab Results   Component Value Date    TSH 0.64 03/16/2023    INR 1.01 03/31/2023        The patient states understanding and is agreeable with the plan.     Darlene Barahona PA-C  [facility]  Electrophysiology Consult Service  Pager: [phone]    I spent a total of 20 minutes face to face with Kayli Morillo during today's office " visit. I have spent an additional 15 minutes today on chart review and documentation.

## 2024-07-18 NOTE — PATIENT INSTRUCTIONS
It was a pleasure to see you in clinic today.  Please do not hesitate to call with any questions or concerns.  You are scheduled for a remote transmission from home on 10/23/2024.      Kenzie Ford RN.  Electrophysiology Nurse Clinician  [facility]    During Business Hours Please Call:  [phone]  After Hours Please Call:  [phone] - select option #4 and ask for job code [phone]     Discharge Destination: home Follow-Up: As Needed Detail Level: Detailed

## 2024-07-24 LAB
MDC_IDC_EPISODE_DTM: NORMAL
MDC_IDC_EPISODE_DTM: NORMAL
MDC_IDC_EPISODE_DURATION: 1 S
MDC_IDC_EPISODE_DURATION: 2 S
MDC_IDC_EPISODE_ID: 1
MDC_IDC_EPISODE_ID: 2
MDC_IDC_EPISODE_TYPE: NORMAL
MDC_IDC_EPISODE_TYPE: NORMAL
MDC_IDC_LEAD_CONNECTION_STATUS: NORMAL
MDC_IDC_LEAD_CONNECTION_STATUS: NORMAL
MDC_IDC_LEAD_IMPLANT_DT: NORMAL
MDC_IDC_LEAD_IMPLANT_DT: NORMAL
MDC_IDC_LEAD_LOCATION: NORMAL
MDC_IDC_LEAD_LOCATION: NORMAL
MDC_IDC_LEAD_LOCATION_DETAIL_1: NORMAL
MDC_IDC_LEAD_LOCATION_DETAIL_1: NORMAL
MDC_IDC_LEAD_MFG: NORMAL
MDC_IDC_LEAD_MFG: NORMAL
MDC_IDC_LEAD_MODEL: NORMAL
MDC_IDC_LEAD_MODEL: NORMAL
MDC_IDC_LEAD_POLARITY_TYPE: NORMAL
MDC_IDC_LEAD_POLARITY_TYPE: NORMAL
MDC_IDC_LEAD_SERIAL: NORMAL
MDC_IDC_LEAD_SERIAL: NORMAL
MDC_IDC_LEAD_SPECIAL_FUNCTION: NORMAL
MDC_IDC_LEAD_SPECIAL_FUNCTION: NORMAL
MDC_IDC_MSMT_BATTERY_DTM: NORMAL
MDC_IDC_MSMT_BATTERY_REMAINING_LONGEVITY: 153 MO
MDC_IDC_MSMT_BATTERY_RRT_TRIGGER: NORMAL
MDC_IDC_MSMT_BATTERY_STATUS: NORMAL
MDC_IDC_MSMT_BATTERY_VOLTAGE: 3.12 V
MDC_IDC_MSMT_CAP_CHARGE_DTM: NORMAL
MDC_IDC_MSMT_CAP_CHARGE_ENERGY: 18 J
MDC_IDC_MSMT_CAP_CHARGE_TIME: 3.5 S
MDC_IDC_MSMT_CAP_CHARGE_TYPE: NORMAL
MDC_IDC_MSMT_LEADCHNL_RA_IMPEDANCE_VALUE: 456 OHM
MDC_IDC_MSMT_LEADCHNL_RA_PACING_THRESHOLD_AMPLITUDE: 0.5 V
MDC_IDC_MSMT_LEADCHNL_RA_PACING_THRESHOLD_PULSEWIDTH: 0.4 MS
MDC_IDC_MSMT_LEADCHNL_RA_SENSING_INTR_AMPL: 5.1 MV
MDC_IDC_MSMT_LEADCHNL_RV_IMPEDANCE_VALUE: 380 OHM
MDC_IDC_MSMT_LEADCHNL_RV_IMPEDANCE_VALUE: 475 OHM
MDC_IDC_MSMT_LEADCHNL_RV_PACING_THRESHOLD_AMPLITUDE: 0.75 V
MDC_IDC_MSMT_LEADCHNL_RV_PACING_THRESHOLD_PULSEWIDTH: 0.4 MS
MDC_IDC_MSMT_LEADCHNL_RV_SENSING_INTR_AMPL: 25.4 MV
MDC_IDC_PG_IMPLANT_DTM: NORMAL
MDC_IDC_PG_MFG: NORMAL
MDC_IDC_PG_MODEL: NORMAL
MDC_IDC_PG_SERIAL: NORMAL
MDC_IDC_PG_TYPE: NORMAL
MDC_IDC_SESS_CLINIC_NAME: NORMAL
MDC_IDC_SESS_DTM: NORMAL
MDC_IDC_SESS_TYPE: NORMAL
MDC_IDC_SET_BRADY_AT_MODE_SWITCH_RATE: 171 {BEATS}/MIN
MDC_IDC_SET_BRADY_LOWRATE: 50 {BEATS}/MIN
MDC_IDC_SET_BRADY_MAX_SENSOR_RATE: 130 {BEATS}/MIN
MDC_IDC_SET_BRADY_MAX_TRACKING_RATE: 130 {BEATS}/MIN
MDC_IDC_SET_BRADY_MODE: NORMAL
MDC_IDC_SET_BRADY_PAV_DELAY_LOW: 180 MS
MDC_IDC_SET_BRADY_SAV_DELAY_LOW: 150 MS
MDC_IDC_SET_LEADCHNL_RA_PACING_AMPLITUDE: 1.5 V
MDC_IDC_SET_LEADCHNL_RA_PACING_ANODE_ELECTRODE_1: NORMAL
MDC_IDC_SET_LEADCHNL_RA_PACING_ANODE_LOCATION_1: NORMAL
MDC_IDC_SET_LEADCHNL_RA_PACING_CAPTURE_MODE: NORMAL
MDC_IDC_SET_LEADCHNL_RA_PACING_CATHODE_ELECTRODE_1: NORMAL
MDC_IDC_SET_LEADCHNL_RA_PACING_CATHODE_LOCATION_1: NORMAL
MDC_IDC_SET_LEADCHNL_RA_PACING_POLARITY: NORMAL
MDC_IDC_SET_LEADCHNL_RA_PACING_PULSEWIDTH: 0.4 MS
MDC_IDC_SET_LEADCHNL_RA_SENSING_ANODE_ELECTRODE_1: NORMAL
MDC_IDC_SET_LEADCHNL_RA_SENSING_ANODE_LOCATION_1: NORMAL
MDC_IDC_SET_LEADCHNL_RA_SENSING_CATHODE_ELECTRODE_1: NORMAL
MDC_IDC_SET_LEADCHNL_RA_SENSING_CATHODE_LOCATION_1: NORMAL
MDC_IDC_SET_LEADCHNL_RA_SENSING_POLARITY: NORMAL
MDC_IDC_SET_LEADCHNL_RA_SENSING_SENSITIVITY: 0.3 MV
MDC_IDC_SET_LEADCHNL_RV_PACING_AMPLITUDE: 2 V
MDC_IDC_SET_LEADCHNL_RV_PACING_ANODE_ELECTRODE_1: NORMAL
MDC_IDC_SET_LEADCHNL_RV_PACING_ANODE_LOCATION_1: NORMAL
MDC_IDC_SET_LEADCHNL_RV_PACING_CAPTURE_MODE: NORMAL
MDC_IDC_SET_LEADCHNL_RV_PACING_CATHODE_ELECTRODE_1: NORMAL
MDC_IDC_SET_LEADCHNL_RV_PACING_CATHODE_LOCATION_1: NORMAL
MDC_IDC_SET_LEADCHNL_RV_PACING_POLARITY: NORMAL
MDC_IDC_SET_LEADCHNL_RV_PACING_PULSEWIDTH: 0.4 MS
MDC_IDC_SET_LEADCHNL_RV_SENSING_ANODE_ELECTRODE_1: NORMAL
MDC_IDC_SET_LEADCHNL_RV_SENSING_ANODE_LOCATION_1: NORMAL
MDC_IDC_SET_LEADCHNL_RV_SENSING_CATHODE_ELECTRODE_1: NORMAL
MDC_IDC_SET_LEADCHNL_RV_SENSING_CATHODE_LOCATION_1: NORMAL
MDC_IDC_SET_LEADCHNL_RV_SENSING_POLARITY: NORMAL
MDC_IDC_SET_LEADCHNL_RV_SENSING_SENSITIVITY: 0.3 MV
MDC_IDC_SET_ZONE_DETECTION_BEATS_DENOMINATOR: 16 {BEATS}
MDC_IDC_SET_ZONE_DETECTION_BEATS_DENOMINATOR: 32 {BEATS}
MDC_IDC_SET_ZONE_DETECTION_BEATS_DENOMINATOR: 40 {BEATS}
MDC_IDC_SET_ZONE_DETECTION_BEATS_NUMERATOR: 16 {BEATS}
MDC_IDC_SET_ZONE_DETECTION_BEATS_NUMERATOR: 30 {BEATS}
MDC_IDC_SET_ZONE_DETECTION_BEATS_NUMERATOR: 32 {BEATS}
MDC_IDC_SET_ZONE_DETECTION_INTERVAL: 250 MS
MDC_IDC_SET_ZONE_DETECTION_INTERVAL: 300 MS
MDC_IDC_SET_ZONE_DETECTION_INTERVAL: 350 MS
MDC_IDC_SET_ZONE_DETECTION_INTERVAL: 360 MS
MDC_IDC_SET_ZONE_DETECTION_INTERVAL: 360 MS
MDC_IDC_SET_ZONE_STATUS: NORMAL
MDC_IDC_SET_ZONE_TYPE: NORMAL
MDC_IDC_SET_ZONE_VENDOR_TYPE: NORMAL
MDC_IDC_STAT_AT_BURDEN_PERCENT: 0 %
MDC_IDC_STAT_AT_DTM_END: NORMAL
MDC_IDC_STAT_AT_DTM_START: NORMAL
MDC_IDC_STAT_BRADY_AP_VP_PERCENT: 0 %
MDC_IDC_STAT_BRADY_AP_VS_PERCENT: 0.25 %
MDC_IDC_STAT_BRADY_AS_VP_PERCENT: 0.03 %
MDC_IDC_STAT_BRADY_AS_VS_PERCENT: 99.72 %
MDC_IDC_STAT_BRADY_DTM_END: NORMAL
MDC_IDC_STAT_BRADY_DTM_START: NORMAL
MDC_IDC_STAT_BRADY_RA_PERCENT_PACED: 0.35 %
MDC_IDC_STAT_BRADY_RV_PERCENT_PACED: 0.03 %
MDC_IDC_STAT_EPISODE_RECENT_COUNT: 0
MDC_IDC_STAT_EPISODE_RECENT_COUNT: 2
MDC_IDC_STAT_EPISODE_RECENT_COUNT_DTM_END: NORMAL
MDC_IDC_STAT_EPISODE_RECENT_COUNT_DTM_START: NORMAL
MDC_IDC_STAT_EPISODE_TOTAL_COUNT: 0
MDC_IDC_STAT_EPISODE_TOTAL_COUNT: 2
MDC_IDC_STAT_EPISODE_TOTAL_COUNT_DTM_END: NORMAL
MDC_IDC_STAT_EPISODE_TOTAL_COUNT_DTM_START: NORMAL
MDC_IDC_STAT_EPISODE_TYPE: NORMAL
MDC_IDC_STAT_TACHYTHERAPY_ATP_DELIVERED_RECENT: 0
MDC_IDC_STAT_TACHYTHERAPY_ATP_DELIVERED_TOTAL: 0
MDC_IDC_STAT_TACHYTHERAPY_RECENT_DTM_END: NORMAL
MDC_IDC_STAT_TACHYTHERAPY_RECENT_DTM_START: NORMAL
MDC_IDC_STAT_TACHYTHERAPY_SHOCKS_ABORTED_RECENT: 0
MDC_IDC_STAT_TACHYTHERAPY_SHOCKS_ABORTED_TOTAL: 0
MDC_IDC_STAT_TACHYTHERAPY_SHOCKS_DELIVERED_RECENT: 0
MDC_IDC_STAT_TACHYTHERAPY_SHOCKS_DELIVERED_TOTAL: 0
MDC_IDC_STAT_TACHYTHERAPY_TOTAL_DTM_END: NORMAL
MDC_IDC_STAT_TACHYTHERAPY_TOTAL_DTM_START: NORMAL

## 2024-07-28 ENCOUNTER — HEALTH MAINTENANCE LETTER (OUTPATIENT)
Age: 74
End: 2024-07-28

## 2024-07-31 ENCOUNTER — MYC MEDICAL ADVICE (OUTPATIENT)
Dept: CARDIOLOGY | Facility: CLINIC | Age: 74
End: 2024-07-31
Payer: MEDICARE

## 2024-07-31 NOTE — TELEPHONE ENCOUNTER
Called and spoke with patient who states the Entresto never arrived. Patient was asked to  call [facility] to give them a correct address and to inquire about the Entresto. Address listed in EPIC is not correct. Patient will call to change her address in [facility]. Patient will notify us if she is not able to  get an answer from [employer] and needs further assistance.

## 2024-08-19 ENCOUNTER — OFFICE VISIT (OUTPATIENT)
Dept: FAMILY MEDICINE | Facility: CLINIC | Age: 74
End: 2024-08-19
Payer: MEDICARE

## 2024-08-19 ENCOUNTER — TELEPHONE (OUTPATIENT)
Dept: FAMILY MEDICINE | Facility: CLINIC | Age: 74
End: 2024-08-19

## 2024-08-19 VITALS
TEMPERATURE: 97.7 F | BODY MASS INDEX: 27.12 KG/M2 | OXYGEN SATURATION: 97 % | DIASTOLIC BLOOD PRESSURE: 72 MMHG | HEART RATE: 65 BPM | WEIGHT: 134.5 LBS | SYSTOLIC BLOOD PRESSURE: 144 MMHG | RESPIRATION RATE: 16 BRPM

## 2024-08-19 DIAGNOSIS — I47.29 PAROXYSMAL VENTRICULAR TACHYCARDIA (H): ICD-10-CM

## 2024-08-19 DIAGNOSIS — E78.2 MIXED HYPERLIPIDEMIA: ICD-10-CM

## 2024-08-19 DIAGNOSIS — I49.3 PVC'S (PREMATURE VENTRICULAR CONTRACTIONS): ICD-10-CM

## 2024-08-19 DIAGNOSIS — I42.8 NONISCHEMIC CARDIOMYOPATHY (H): ICD-10-CM

## 2024-08-19 DIAGNOSIS — R06.02 SOB (SHORTNESS OF BREATH): ICD-10-CM

## 2024-08-19 DIAGNOSIS — E11.9 TYPE 2 DIABETES MELLITUS WITHOUT COMPLICATION, WITHOUT LONG-TERM CURRENT USE OF INSULIN (H): Primary | ICD-10-CM

## 2024-08-19 DIAGNOSIS — I50.20 HEART FAILURE WITH REDUCED EJECTION FRACTION, NYHA CLASS III (H): ICD-10-CM

## 2024-08-19 DIAGNOSIS — I10 BENIGN ESSENTIAL HYPERTENSION: ICD-10-CM

## 2024-08-19 DIAGNOSIS — I50.9 CHRONIC CONGESTIVE HEART FAILURE, UNSPECIFIED HEART FAILURE TYPE (H): ICD-10-CM

## 2024-08-19 LAB
ALBUMIN SERPL BCG-MCNC: 4.1 G/DL (ref 3.5–5.2)
ALP SERPL-CCNC: 95 U/L (ref 40–150)
ALT SERPL W P-5'-P-CCNC: 16 U/L (ref 0–50)
ANION GAP SERPL CALCULATED.3IONS-SCNC: 7 MMOL/L (ref 7–15)
AST SERPL W P-5'-P-CCNC: 25 U/L (ref 0–45)
BILIRUB SERPL-MCNC: 0.3 MG/DL
BUN SERPL-MCNC: 22.2 MG/DL (ref 8–23)
CALCIUM SERPL-MCNC: 9.2 MG/DL (ref 8.8–10.4)
CHLORIDE SERPL-SCNC: 105 MMOL/L (ref 98–107)
CHOLEST SERPL-MCNC: 102 MG/DL
CREAT SERPL-MCNC: 0.96 MG/DL (ref 0.51–0.95)
CREAT UR-MCNC: 69.6 MG/DL
EGFRCR SERPLBLD CKD-EPI 2021: 61 ML/MIN/1.73M2
ERYTHROCYTE [DISTWIDTH] IN BLOOD BY AUTOMATED COUNT: 12.5 % (ref 10–15)
FASTING STATUS PATIENT QL REPORTED: YES
FASTING STATUS PATIENT QL REPORTED: YES
GLUCOSE SERPL-MCNC: 150 MG/DL (ref 70–99)
HBA1C MFR BLD: 7.7 % (ref 0–5.6)
HCO3 SERPL-SCNC: 28 MMOL/L (ref 22–29)
HCT VFR BLD AUTO: 40.3 % (ref 35–47)
HDLC SERPL-MCNC: 52 MG/DL
HGB BLD-MCNC: 13.1 G/DL (ref 11.7–15.7)
LDLC SERPL CALC-MCNC: 40 MG/DL
MCH RBC QN AUTO: 30.8 PG (ref 26.5–33)
MCHC RBC AUTO-ENTMCNC: 32.5 G/DL (ref 31.5–36.5)
MCV RBC AUTO: 95 FL (ref 78–100)
MICROALBUMIN UR-MCNC: <12 MG/L
MICROALBUMIN/CREAT UR: NORMAL MG/G{CREAT}
NONHDLC SERPL-MCNC: 50 MG/DL
NT-PROBNP SERPL-MCNC: 395 PG/ML (ref 0–900)
PLATELET # BLD AUTO: 205 10E3/UL (ref 150–450)
POTASSIUM SERPL-SCNC: 5.1 MMOL/L (ref 3.4–5.3)
PROT SERPL-MCNC: 6.8 G/DL (ref 6.4–8.3)
RBC # BLD AUTO: 4.26 10E6/UL (ref 3.8–5.2)
SODIUM SERPL-SCNC: 140 MMOL/L (ref 135–145)
TRIGL SERPL-MCNC: 50 MG/DL
VIT B12 SERPL-MCNC: 594 PG/ML (ref 232–1245)
WBC # BLD AUTO: 8.2 10E3/UL (ref 4–11)

## 2024-08-19 PROCEDURE — G2211 COMPLEX E/M VISIT ADD ON: HCPCS | Performed by: FAMILY MEDICINE

## 2024-08-19 PROCEDURE — 83036 HEMOGLOBIN GLYCOSYLATED A1C: CPT | Performed by: FAMILY MEDICINE

## 2024-08-19 PROCEDURE — 82570 ASSAY OF URINE CREATININE: CPT | Performed by: FAMILY MEDICINE

## 2024-08-19 PROCEDURE — 82043 UR ALBUMIN QUANTITATIVE: CPT | Performed by: FAMILY MEDICINE

## 2024-08-19 PROCEDURE — 36415 COLL VENOUS BLD VENIPUNCTURE: CPT | Performed by: FAMILY MEDICINE

## 2024-08-19 PROCEDURE — 83880 ASSAY OF NATRIURETIC PEPTIDE: CPT | Performed by: FAMILY MEDICINE

## 2024-08-19 PROCEDURE — 85027 COMPLETE CBC AUTOMATED: CPT | Performed by: FAMILY MEDICINE

## 2024-08-19 PROCEDURE — 82607 VITAMIN B-12: CPT | Performed by: FAMILY MEDICINE

## 2024-08-19 PROCEDURE — 99204 OFFICE O/P NEW MOD 45 MIN: CPT | Performed by: FAMILY MEDICINE

## 2024-08-19 PROCEDURE — 80053 COMPREHEN METABOLIC PANEL: CPT | Performed by: FAMILY MEDICINE

## 2024-08-19 PROCEDURE — 80061 LIPID PANEL: CPT | Performed by: FAMILY MEDICINE

## 2024-08-19 RX ORDER — DAPAGLIFLOZIN 5 MG/1
5 TABLET, FILM COATED ORAL DAILY
Qty: 90 TABLET | Refills: 0 | Status: SHIPPED | OUTPATIENT
Start: 2024-08-19

## 2024-08-19 NOTE — RESULT ENCOUNTER NOTE
A1c 7.7%, given CHF history I would like to add an SGLT2 inhibitor.     Please let the patient know about side effects and possible cost concerns. If there is a cost issue I can send to [facility]. I also put in an A1c check that she can get in November, but can wait until January if she would prefer.     MOHAMUD PARISI, DO

## 2024-08-19 NOTE — PROGRESS NOTES
Assessment & Plan     Type 2 diabetes mellitus without complication, without long-term current use of insulin (H)  Chronic, not well controlled. She is on 1 gm metformin every day, has no GI side effects but does not like odor of medication. Given A1c above goal and CHF history, recommend addition of SGLT2 rather than increasing metformin dose.   - REVIEW OF HEALTH MAINTENANCE PROTOCOL ORDERS  - Lipid panel reflex to direct LDL Non-fasting  - Hemoglobin A1c  - Vitamin B12  - Albumin Random Urine Quantitative with Creat Ratio  - Lipid panel reflex to direct LDL Non-fasting  - Hemoglobin A1c  - Vitamin B12  - Albumin Random Urine Quantitative with Creat Ratio    Mixed hyperlipidemia  Chronic, stable. Managed per cardiology. Agree with moderate intensity statin therapy.   - Lipid panel reflex to direct LDL Non-fasting  - Comprehensive metabolic panel  - N terminal pro BNP outpatient  - CBC with platelets  - Lipid panel reflex to direct LDL Non-fasting    Benign essential hypertension  Chronic, stable at home. Patient experiencing white coat hypertension today, home readings typically 110s-120s/60s-70s mmHg.   - Comprehensive metabolic panel  - N terminal pro BNP outpatient  - CBC with platelets    Heart failure with reduced ejection fraction, NYHA class III (H)  Chronic, stable. Addition of SGLT2 inhibitor today.   - Comprehensive metabolic panel  - N terminal pro BNP outpatient  - CBC with platelets    Nonischemic cardiomyopathy (H)  See above.   - Comprehensive metabolic panel  - N terminal pro BNP outpatient  - CBC with platelets    PVC's (premature ventricular contractions)  Chronic, stable.   - Comprehensive metabolic panel  - N terminal pro BNP outpatient  - CBC with platelets    Paroxysmal ventricular tachycardia (H)  Chronic, stable.   - Comprehensive metabolic panel  - N terminal pro BNP outpatient  - CBC with platelets    SOB (shortness of breath)  - Comprehensive metabolic panel  - N terminal pro BNP  "outpatient  - CBC with platelets    Chronic congestive heart failure, unspecified heart failure type (H)  Chronic, stable.         The longitudinal plan of care for the diagnosis(es)/condition(s) as documented were addressed during this visit. Due to the added complexity in care, I will continue to support Kayli in the subsequent management and with ongoing continuity of care.    BMI  Estimated body mass index is 27.12 kg/m  as calculated from the following:    Height as of 1/23/24: 1.5 m (4' 11.06\").    Weight as of this encounter: 61 kg (134 lb 8 oz).         See Patient Instructions    Subjective   Kayli is a 73 year old, presenting for the following health issues:  Establish Care and Diabetes        8/19/2024     8:11 AM   Additional Questions   Roomed by Iqra Vides     History of Present Illness       Diabetes:   She presents for follow up of diabetes.  She is checking home blood glucose a few times a month.   She checks blood glucose at bedtime.  Blood glucose is sometimes over 200 and never under 70. She is aware of hypoglycemia symptoms including shakiness.   She is concerned about other.   She is having weight gain.            Vascular Disease:  She presents for follow up of vascular disease.     She never takes nitroglycerin. She takes daily aspirin.    She eats 0-1 servings of fruits and vegetables daily.She consumes 0 sweetened beverage(s) daily.She exercises with enough effort to increase her heart rate 9 or less minutes per day.  She exercises with enough effort to increase her heart rate 3 or less days per week.   She is taking medications regularly.     Patient is here to establish care. She's up to date on her medication refill.     Review of Systems  Constitutional, HEENT, cardiovascular, pulmonary, gi and gu systems are negative, except as otherwise noted.      Objective    BP (!) 144/72 (BP Location: Right arm, Patient Position: Sitting, Cuff Size: Adult Regular)   Pulse 65   Temp 97.7  F (36.5  C) " (Oral)   Resp 16   Wt 61 kg (134 lb 8 oz)   SpO2 97%   BMI 27.12 kg/m    Body mass index is 27.12 kg/m .  Physical Exam   GENERAL: alert and no distress  EYES: Eyes grossly normal to inspection, PERRL and conjunctivae and sclerae normal  NECK: no adenopathy, no asymmetry, masses, or scars  MS: no gross musculoskeletal defects noted, no edema  Diabetic foot exam: normal DP and PT pulses, no trophic changes or ulcerative lesions, and normal sensory exam    Results for orders placed or performed in visit on 08/19/24 (from the past 24 hour(s))   CBC with platelets   Result Value Ref Range    WBC Count 8.2 4.0 - 11.0 10e3/uL    RBC Count 4.26 3.80 - 5.20 10e6/uL    Hemoglobin 13.1 11.7 - 15.7 g/dL    Hematocrit 40.3 35.0 - 47.0 %    MCV 95 78 - 100 fL    MCH 30.8 26.5 - 33.0 pg    MCHC 32.5 31.5 - 36.5 g/dL    RDW 12.5 10.0 - 15.0 %    Platelet Count 205 150 - 450 10e3/uL   Hemoglobin A1c   Result Value Ref Range    Hemoglobin A1C 7.7 (H) 0.0 - 5.6 %           Signed Electronically by: MOHAMUD PARISI DO

## 2024-08-19 NOTE — TELEPHONE ENCOUNTER
----- Message from MOHAMUD PARISI sent at 8/19/2024  9:36 AM CDT -----  A1c 7.7%, given CHF history I would like to add an SGLT2 inhibitor.     Please let the patient know about side effects and possible cost concerns. If there is a cost issue I can send to [facility]. I also put in an A1c check that she can get in November, but can wait until January if she would prefer.     MOHAMUD PARISI, DO

## 2024-08-19 NOTE — PATIENT INSTRUCTIONS
"Please send in refills for the six month follow up in January. I will have someone covering my inbox while I am gone in February-May, but just in case!  For any urgent care needs, please call the clinic to speak to the nurse or \"TC\" staff. They have access to my approval required slots and can get you in the same week.   "

## 2024-08-19 NOTE — TELEPHONE ENCOUNTER
LMTCB. If call is returned, please relay result message below OR may transfer call to nurse if needed.     Thank you

## 2024-08-20 NOTE — TELEPHONE ENCOUNTER
Called and spoke with pt. Pt would prefer to make diet and lifestyle changes to improve A1C rather than add another medication to her daily regimen.    Pt is wondering PCP thoughts on this?    Pt states if A1C does not improve in Nov she would be open to starting a medication for this.    KATERINA Adames.

## 2024-08-21 NOTE — TELEPHONE ENCOUNTER
Central Prior Authorization Team  Phone: [phone]    PA Initiation    Medication: DAPAGLIFLOZIN PROPANEDIOL 5 MG PO TABS  Insurance Company: [employer] - Phone [phone] Fax [phone]  Pharmacy Filling the Rx: [place], [place] - [address] [facility]  Filling Pharmacy Phone: [phone]  Filling Pharmacy Fax:    Start Date:

## 2024-08-21 NOTE — RESULT ENCOUNTER NOTE
Hello -    Here are my comments about your recent results:  -Cholesterol levels (LDL,HDL, Triglycerides) are normal.  ADVISE: rechecking in 1 year.   -Liver and gallbladder tests are normal (ALT,AST, Alk phos, bilirubin), kidney function is normal (Cr, GFR), sodium is normal, potassium is normal, calcium is normal, glucose is normal.  -Vitamin B12 result is normal  -Microalbumin (urine protein) test is normal.  ADVISE: rechecking this annually.  For additional lab test information, labtestsonline.org is an excellent reference..    Please let us know if you have any questions or concerns.     Regards,  MOHAMUD PARISI, DO

## 2024-08-23 NOTE — TELEPHONE ENCOUNTER
Central Prior Authorization Team  Phone: [phone]    PRIOR AUTHORIZATION DENIED    Medication: DAPAGLIFLOZIN PROPANEDIOL 5 MG PO TABS  Insurance Company: [employer] - Phone [phone] Fax [phone]  Denial Date: 8/21/2024  Denial Reason(s):           Appeal Information:         Patient Notified: NO- Unfortunately, we cannot call the patient with denials because we do not know what next steps the MD will take nor can we give medical advice, please notify the patient of what they are to expect for the continuation of their therapy from the provider.

## 2024-08-23 NOTE — TELEPHONE ENCOUNTER
Patient prefers lifestyle changes. If unable to make goal with that, will prescribe farxiga since it is covered under formulary.

## 2024-08-27 ENCOUNTER — OFFICE VISIT (OUTPATIENT)
Dept: CARDIOLOGY | Facility: CLINIC | Age: 74
End: 2024-08-27
Attending: INTERNAL MEDICINE
Payer: MEDICARE

## 2024-08-27 ENCOUNTER — LAB (OUTPATIENT)
Dept: LAB | Facility: CLINIC | Age: 74
End: 2024-08-27
Payer: MEDICARE

## 2024-08-27 VITALS
WEIGHT: 134.5 LBS | OXYGEN SATURATION: 97 % | SYSTOLIC BLOOD PRESSURE: 133 MMHG | DIASTOLIC BLOOD PRESSURE: 74 MMHG | HEART RATE: 82 BPM | BODY MASS INDEX: 27.12 KG/M2

## 2024-08-27 DIAGNOSIS — I50.20 HEART FAILURE WITH REDUCED EJECTION FRACTION, NYHA CLASS III (H): ICD-10-CM

## 2024-08-27 DIAGNOSIS — I42.8 NON-ISCHEMIC CARDIOMYOPATHY (H): Primary | ICD-10-CM

## 2024-08-27 DIAGNOSIS — Z95.810 CARDIAC RESYNCHRONIZATION THERAPY DEFIBRILLATOR (CRT-D) IN PLACE: ICD-10-CM

## 2024-08-27 DIAGNOSIS — I49.3 PVC'S (PREMATURE VENTRICULAR CONTRACTIONS): ICD-10-CM

## 2024-08-27 DIAGNOSIS — I42.8 NON-ISCHEMIC CARDIOMYOPATHY (H): ICD-10-CM

## 2024-08-27 DIAGNOSIS — R06.02 SHORTNESS OF BREATH: ICD-10-CM

## 2024-08-27 DIAGNOSIS — R93.1 DECREASED CARDIAC EJECTION FRACTION: ICD-10-CM

## 2024-08-27 DIAGNOSIS — I47.29 NSVT (NONSUSTAINED VENTRICULAR TACHYCARDIA) (H): ICD-10-CM

## 2024-08-27 DIAGNOSIS — E78.2 MIXED HYPERLIPIDEMIA: ICD-10-CM

## 2024-08-27 DIAGNOSIS — I10 BENIGN ESSENTIAL HYPERTENSION: ICD-10-CM

## 2024-08-27 DIAGNOSIS — I42.8 NONISCHEMIC CARDIOMYOPATHY (H): ICD-10-CM

## 2024-08-27 LAB
ALBUMIN SERPL BCG-MCNC: 4.1 G/DL (ref 3.5–5.2)
ALP SERPL-CCNC: 97 U/L (ref 40–150)
ALT SERPL W P-5'-P-CCNC: 23 U/L (ref 0–50)
ANION GAP SERPL CALCULATED.3IONS-SCNC: 9 MMOL/L (ref 7–15)
AST SERPL W P-5'-P-CCNC: 22 U/L (ref 0–45)
BILIRUB SERPL-MCNC: 0.4 MG/DL
BUN SERPL-MCNC: 20.8 MG/DL (ref 8–23)
CALCIUM SERPL-MCNC: 10.2 MG/DL (ref 8.8–10.4)
CHLORIDE SERPL-SCNC: 104 MMOL/L (ref 98–107)
CREAT SERPL-MCNC: 0.94 MG/DL (ref 0.51–0.95)
EGFRCR SERPLBLD CKD-EPI 2021: 63 ML/MIN/1.73M2
GLUCOSE SERPL-MCNC: 104 MG/DL (ref 70–99)
HCO3 SERPL-SCNC: 28 MMOL/L (ref 22–29)
POTASSIUM SERPL-SCNC: 4.9 MMOL/L (ref 3.4–5.3)
PROT SERPL-MCNC: 7.1 G/DL (ref 6.4–8.3)
SODIUM SERPL-SCNC: 141 MMOL/L (ref 135–145)

## 2024-08-27 PROCEDURE — 36415 COLL VENOUS BLD VENIPUNCTURE: CPT | Performed by: PATHOLOGY

## 2024-08-27 PROCEDURE — 80053 COMPREHEN METABOLIC PANEL: CPT | Performed by: PATHOLOGY

## 2024-08-27 PROCEDURE — G0463 HOSPITAL OUTPT CLINIC VISIT: HCPCS | Performed by: INTERNAL MEDICINE

## 2024-08-27 PROCEDURE — 99214 OFFICE O/P EST MOD 30 MIN: CPT | Mod: GC | Performed by: INTERNAL MEDICINE

## 2024-08-27 RX ORDER — METOPROLOL SUCCINATE 50 MG/1
50 TABLET, EXTENDED RELEASE ORAL DAILY
Qty: 90 TABLET | Refills: 3 | Status: SHIPPED | OUTPATIENT
Start: 2024-08-27

## 2024-08-27 ASSESSMENT — PAIN SCALES - GENERAL: PAINLEVEL: NO PAIN (0)

## 2024-08-27 NOTE — PATIENT INSTRUCTIONS
Dr. Schilling recommends:    Increase Metoprolol Succinate to 50 MG once daily.    Lab appointment today. (Comprehensive Metabolic Panel)    Follow up clinic visit with Dr. Schilling in 6 months with labs the same day.    Thank you for your visit today.  Please call me with any questions or concerns.   Bhavesh Herrera RN  Cardiology Care Coordinator  [phone]

## 2024-08-27 NOTE — NURSING NOTE
Chief Complaint   Patient presents with    Follow Up     8/27/24--Dr. Schilling: Chronic systolic heart failure.       Vitals were taken and medications reconciled.    German Nieves, EMT  3:24 PM

## 2024-08-27 NOTE — PROGRESS NOTES
"Cardiology Clinic Note    August 27, 2024    HPI  Kayli Morillo  is a 73 year old year old female with a past medical history significant for HFrEF 2/2 NICM, nonobstructive CAD from 3/2023 on coronary angiogram, and T2DM who presents for re-evaluation of chronic systolic heart failure. Kayli initially presented to an OSH earlier in 2023. She was dyspneic, had a \"raspiness\" in her throat and mild lower extremity edema. A brief w/u revealed a pleural effusion and decreased cardiac function by bedside ultrasound. She was then seen in cardiology clinic with an echocardiogram. This echo revealed decreased LVEF of 14%. A subsequent coronary angiogram revealed nonobstructive CAD, and a right heart catheterization revealed normal filling pressures and borderline normal cardiac output/index. Given the lack of ischemic etiology, a cardiac MRI was ordered.  She did have some VT episodes on Zio and were associated with some symptoms.     She was last seen in clinic by Dr. Schilling 01/2024. Her entresto and metoprolol succinate were increased at last visit. She has undergone ICD implantation 4/2024 in the interim.     Currently, she notes that she is doing very well denies any new complaints or issues.  Takes all medications as prescribed.  She is in fact in the middle of a move from [place] to [place] so that she is going to be actually be closer.  No dizziness lightheadedness palpitation chest pains no problems with medications.  No other issues    PAST MEDICAL HISTORY:  Past Medical History:   Diagnosis Date    Chronic systolic heart failure (H)     DM2 (diabetes mellitus, type 2) (H)     Gastroesophageal reflux disease without esophagitis     Osteopenia      CURRENT MEDICATIONS:  Current Outpatient Medications   Medication Sig Dispense Refill    acetaminophen-codeine (TYLENOL #3) 300-30 MG per tablet Take 1 tablet by mouth every 4 hours as needed for moderate pain 12 tablet 0    aspirin (ASA) 81 MG chewable tablet   "     atorvastatin (LIPITOR) 20 MG tablet Take 1 tablet (20 mg) by mouth daily 90 tablet 3    Calcium Carb-Cholecalciferol 500-10 MG-MCG TABS       dapagliflozin (FARXIGA) 5 MG TABS tablet Take 1 tablet (5 mg) by mouth daily 90 tablet 0    metFORMIN (GLUCOPHAGE) 500 MG tablet 2 times daily (with meals)      metoprolol succinate ER (TOPROL XL) 25 MG 24 hr tablet Take 1.5 tablets (37.5 mg) by mouth daily 135 tablet 1    Multiple Vitamins-Minerals (ICAPS AREDS 2 PO)       sacubitril-valsartan (ENTRESTO) 49-51 MG per tablet Take 1 tablet by mouth 2 times daily 180 tablet 3    spironolactone (ALDACTONE) 25 MG tablet Take 1 tablet (25 mg) by mouth daily 180 tablet 3     FAMILY HISTORY:  Family History   Problem Relation Age of Onset    Diabetes Mother     Cerebrovascular Disease Father     Alzheimer Disease Father      SOCIAL HISTORY:  Social History     Socioeconomic History    Marital status:    Tobacco Use    Smoking status: Never     Passive exposure: Past    Smokeless tobacco: Never     ROS:   10 point ROS negative except HPI    EXAM:  /74 (BP Location: Right arm, Patient Position: Chair, Cuff Size: Adult Regular)   Pulse 82   Wt 61 kg (134 lb 8 oz)   SpO2 97%   BMI 27.12 kg/m    General: appears comfortable, no distress   Head: normocephalic, atraumatic  Eyes: anicteric sclera, EOMI  Neck: no adenopathy  Orophyarynx: clear and moist   Heart:  normal rate, regular rhythm, normal S1/S2, no murmur, no S3 or S4, estimated JVP 8 cm H2O  Lungs: clear, no rales or wheezing  Abdomen: soft, non-tender, non-distended, no hepatosplenomegaly  Extremities: no clubbing, cyanosis.  Only ttrace lower extremity edema.   Neurological: normal speech and affect, no gross motor deficits    Labs:  CBC RESULTS:  Lab Results   Component Value Date    WBC 8.2 08/19/2024    RBC 4.26 08/19/2024    HGB 13.1 08/19/2024    HCT 40.3 08/19/2024    MCV 95 08/19/2024    MCH 30.8 08/19/2024    MCHC 32.5 08/19/2024    RDW 12.5  08/19/2024     08/19/2024     CMP RESULTS:  Lab Results   Component Value Date     08/19/2024    POTASSIUM 5.1 08/19/2024    CHLORIDE 105 08/19/2024    CHLORIDE 104.3 12/18/2023    CO2 28 08/19/2024    ANIONGAP 7 08/19/2024     (H) 08/19/2024     (H) 03/31/2023    BUN 22.2 08/19/2024    CR 0.96 (H) 08/19/2024    GFRESTIMATED 61 08/19/2024    JULIANNE 9.2 08/19/2024    BILITOTAL 0.3 08/19/2024    ALBUMIN 4.1 08/19/2024    ALKPHOS 95 08/19/2024    ALT 16 08/19/2024    AST 25 08/19/2024     Lab Results   Component Value Date    TRIG 86 06/08/2023     Coronary angiogram 3/31/2023:  Mild to moderate non obstructive CAD.  Non ischemic cardiomyopathy.  NIBP 117/56/78  RA --/--/3  RV 42/3  PA 42/14/24  PCWP --/--/10  PA Sat 62%  West CO/CI 3.5/2.2  TD CO/CI 3.6/2.3  SVR 1444 (TD) dynes  PVR 3.6 (TD) AMOR    Echocardiogram 3/16/23  Moderate left ventricular dilation (LVIDd 6.1cm). Severe diffuse hypokinesis.  Biplane LVEF is 14.3%.  The right ventricle is normal size and function.  Moderate mitral insufficiency is present due to dilated left ventricle.  Moderate pulmonary hypertension. Right ventricular systolic pressure is 52mmHg.  The inferior vena cava was normal in size with preserved respiratory variability.     Zio 4/2023:  79 runs of VT up to 19 beats were noted in addition a few episodes of SVT.     CMRI 5/2023:  1. The left ventricle is severely dilated. There is increased trabeculations in the left ventricle. There is severe global hypokinesis. The systolic function severely reduced. The LVEF is traced at 19%.   2. The right ventricle is normal in cavity size. The global systolic function is normal. The RVEF is 51%.   3. The left atrium is severely enlarged. The right atrium is normal in size.   4. There is at least moderate functional mitral regurgitation.  5. There is mild hyperenhancement in the basal septum in the pattern of mid-myocardial stripe consistent with non ischemic fibrosis.    T1  mapping - ECV 33% (elevated).   T2 mapping - no myocardial edema present.   6. There is trivial pericardial effusion.  7. There is no intracardiac thrombus.  CONCLUSIONS:   Non-ischemic cardiomyopathy with severe LV dysfunction and normal right ventricular function, LVEF of 19% and RVEF 51%. No evidence of myocardial edema.      TTE 10/17/23:  Mild left ventricular dilation is present. Left ventricular function is decreased. The ejection fraction is 30-35% (moderately reduced). Abnormal septal motion consistent with left bundle branch block is present. Moderate diffuse hypokinesis is present.  Global right ventricular function is normal. Mild to moderate mitral insufficiency is present. Pulmonary artery systolic pressure is normal. Estimated mean right atrial pressure is normal.   No pericardial effusion is present.    TTE 7/18/24:  Left ventricular function is decreased. The ejection fraction is 35-40% (moderately reduced). The basal inferior and inferoseptum are akinetic.  Global right ventricular function is normal.  Moderate mitral insufficiency is present.  Pulmonary artery systolic pressure is normal. Estimated mean right atrial pressure is normal.  No pericardial effusion is present.    Assessment and Plan:   72-year-old lady with above past medical history including nonischemic cardiomyopathy severely reduced ejection fraction 19% LVEF normal at left ventricular function with episodes of symptomatic VT who is here for follow-up.  She is doing very well and denies any new complaints or issues.  Takes her medications as prescribed.  She continues to take metoprolol XL 37-1/2 mg daily.  We will increase this to 50 mg daily at this time.  She does have the ICD in place, recent interrogation reviewed and there were no episodes of arrhythmias.  At this time we are not making any other medication changes, but she will get lab work on the way out from the clinic given that she had somewhat elevated potassium on most  recent visit.  I am not worried about it however I think it is very reasonable to recheck to ensure it is within the normal range.  No other medication changes    # Chronic systolic heart failure/HFrEF secondary to NICM. EF bertha of 15-20%, improved to 35-40%  Stage C. NYHA Class III.  Fluid status: euvolemic, off loop diuretics   ACEi/ARB/ARNI: Entresto 49/51 BID  BB: Metoprolol succinate 37.5mg daily  Aldosterone: aldactone 25 mg daily   SGLT2i- Dapagliflozin 5mg daily  SCD prophylaxis:  s/p ICD placement 04/2024 (follows with EP)  NSAID use: contraindicated  Sleep apnea evaluation: previously referred to sleep clinic (outside referall)  Remote monitoring: Would consider in the future if more volume concerns, currently I don't think this is needed    # Non-obstructive CAD  - ASA 81  - Atorvastatin 20  - Metoprolol succinate 50mg       German Tavares MD  Cardiology Fellow    Plan Discussed with Attending Physician, Dr. Albaro Schilling.  Please see his attestation for final plan.

## 2024-08-27 NOTE — LETTER
"8/27/2024      RE: Kayli Morillo  [address]  [facility] [address]       Dear Colleague,    Thank you for the opportunity to participate in the care of your patient, Kayli Morillo, at the [facility]. Please see a copy of my visit note below.    Cardiology Clinic Note    August 27, 2024    HPI  Kayli Morillo  is a 73 year old year old female with a past medical history significant for HFrEF 2/2 NICM, nonobstructive CAD from 3/2023 on coronary angiogram, and T2DM who presents for re-evaluation of chronic systolic heart failure. Kayli initially presented to an OSH earlier in 2023. She was dyspneic, had a \"raspiness\" in her throat and mild lower extremity edema. A brief w/u revealed a pleural effusion and decreased cardiac function by bedside ultrasound. She was then seen in cardiology clinic with an echocardiogram. This echo revealed decreased LVEF of 14%. A subsequent coronary angiogram revealed nonobstructive CAD, and a right heart catheterization revealed normal filling pressures and borderline normal cardiac output/index. Given the lack of ischemic etiology, a cardiac MRI was ordered.  She did have some VT episodes on Zio and were associated with some symptoms.     She was last seen in clinic by Dr. Schilling 01/2024. Her entresto and metoprolol succinate were increased at last visit. She has undergone ICD implantation 4/2024 in the interim.     Currently, she notes that she is doing very well denies any new complaints or issues.  Takes all medications as prescribed.  She is in fact in the middle of a move from [place] to [place] so that she is going to be actually be closer.  No dizziness lightheadedness palpitation chest pains no problems with medications.  No other issues    PAST MEDICAL HISTORY:  Past Medical History:   Diagnosis Date     Chronic systolic heart failure (H)      DM2 (diabetes mellitus, " type 2) (H)      Gastroesophageal reflux disease without esophagitis      Osteopenia      CURRENT MEDICATIONS:  Current Outpatient Medications   Medication Sig Dispense Refill     acetaminophen-codeine (TYLENOL #3) 300-30 MG per tablet Take 1 tablet by mouth every 4 hours as needed for moderate pain 12 tablet 0     aspirin (ASA) 81 MG chewable tablet        atorvastatin (LIPITOR) 20 MG tablet Take 1 tablet (20 mg) by mouth daily 90 tablet 3     Calcium Carb-Cholecalciferol 500-10 MG-MCG TABS        dapagliflozin (FARXIGA) 5 MG TABS tablet Take 1 tablet (5 mg) by mouth daily 90 tablet 0     metFORMIN (GLUCOPHAGE) 500 MG tablet 2 times daily (with meals)       metoprolol succinate ER (TOPROL XL) 25 MG 24 hr tablet Take 1.5 tablets (37.5 mg) by mouth daily 135 tablet 1     Multiple Vitamins-Minerals (ICAPS AREDS 2 PO)        sacubitril-valsartan (ENTRESTO) 49-51 MG per tablet Take 1 tablet by mouth 2 times daily 180 tablet 3     spironolactone (ALDACTONE) 25 MG tablet Take 1 tablet (25 mg) by mouth daily 180 tablet 3     FAMILY HISTORY:  Family History   Problem Relation Age of Onset     Diabetes Mother      Cerebrovascular Disease Father      Alzheimer Disease Father      SOCIAL HISTORY:  Social History     Socioeconomic History     Marital status:    Tobacco Use     Smoking status: Never     Passive exposure: Past     Smokeless tobacco: Never     ROS:   10 point ROS negative except HPI    EXAM:  /74 (BP Location: Right arm, Patient Position: Chair, Cuff Size: Adult Regular)   Pulse 82   Wt 61 kg (134 lb 8 oz)   SpO2 97%   BMI 27.12 kg/m    General: appears comfortable, no distress   Head: normocephalic, atraumatic  Eyes: anicteric sclera, EOMI  Neck: no adenopathy  Orophyarynx: clear and moist   Heart:  normal rate, regular rhythm, normal S1/S2, no murmur, no S3 or S4, estimated JVP 8 cm H2O  Lungs: clear, no rales or wheezing  Abdomen: soft, non-tender, non-distended, no  hepatosplenomegaly  Extremities: no clubbing, cyanosis.  Only ttrace lower extremity edema.   Neurological: normal speech and affect, no gross motor deficits    Labs:  CBC RESULTS:  Lab Results   Component Value Date    WBC 8.2 08/19/2024    RBC 4.26 08/19/2024    HGB 13.1 08/19/2024    HCT 40.3 08/19/2024    MCV 95 08/19/2024    MCH 30.8 08/19/2024    MCHC 32.5 08/19/2024    RDW 12.5 08/19/2024     08/19/2024     CMP RESULTS:  Lab Results   Component Value Date     08/19/2024    POTASSIUM 5.1 08/19/2024    CHLORIDE 105 08/19/2024    CHLORIDE 104.3 12/18/2023    CO2 28 08/19/2024    ANIONGAP 7 08/19/2024     (H) 08/19/2024     (H) 03/31/2023    BUN 22.2 08/19/2024    CR 0.96 (H) 08/19/2024    GFRESTIMATED 61 08/19/2024    JULIANNE 9.2 08/19/2024    BILITOTAL 0.3 08/19/2024    ALBUMIN 4.1 08/19/2024    ALKPHOS 95 08/19/2024    ALT 16 08/19/2024    AST 25 08/19/2024     Lab Results   Component Value Date    TRIG 86 06/08/2023     Coronary angiogram 3/31/2023:  Mild to moderate non obstructive CAD.  Non ischemic cardiomyopathy.  NIBP 117/56/78  RA --/--/3  RV 42/3  PA 42/14/24  PCWP --/--/10  PA Sat 62%  West CO/CI 3.5/2.2  TD CO/CI 3.6/2.3  SVR 1444 (TD) dynes  PVR 3.6 (TD) AMOR    Echocardiogram 3/16/23  Moderate left ventricular dilation (LVIDd 6.1cm). Severe diffuse hypokinesis.  Biplane LVEF is 14.3%.  The right ventricle is normal size and function.  Moderate mitral insufficiency is present due to dilated left ventricle.  Moderate pulmonary hypertension. Right ventricular systolic pressure is 52mmHg.  The inferior vena cava was normal in size with preserved respiratory variability.     Zio 4/2023:  79 runs of VT up to 19 beats were noted in addition a few episodes of SVT.     CMRI 5/2023:  1. The left ventricle is severely dilated. There is increased trabeculations in the left ventricle. There is severe global hypokinesis. The systolic function severely reduced. The LVEF is traced at 19%.   2.  The right ventricle is normal in cavity size. The global systolic function is normal. The RVEF is 51%.   3. The left atrium is severely enlarged. The right atrium is normal in size.   4. There is at least moderate functional mitral regurgitation.  5. There is mild hyperenhancement in the basal septum in the pattern of mid-myocardial stripe consistent with non ischemic fibrosis.    T1 mapping - ECV 33% (elevated).   T2 mapping - no myocardial edema present.   6. There is trivial pericardial effusion.  7. There is no intracardiac thrombus.  CONCLUSIONS:   Non-ischemic cardiomyopathy with severe LV dysfunction and normal right ventricular function, LVEF of 19% and RVEF 51%. No evidence of myocardial edema.      TTE 10/17/23:  Mild left ventricular dilation is present. Left ventricular function is decreased. The ejection fraction is 30-35% (moderately reduced). Abnormal septal motion consistent with left bundle branch block is present. Moderate diffuse hypokinesis is present.  Global right ventricular function is normal. Mild to moderate mitral insufficiency is present. Pulmonary artery systolic pressure is normal. Estimated mean right atrial pressure is normal.   No pericardial effusion is present.    TTE 7/18/24:  Left ventricular function is decreased. The ejection fraction is 35-40% (moderately reduced). The basal inferior and inferoseptum are akinetic.  Global right ventricular function is normal.  Moderate mitral insufficiency is present.  Pulmonary artery systolic pressure is normal. Estimated mean right atrial pressure is normal.  No pericardial effusion is present.    Assessment and Plan:   72-year-old lady with above past medical history including nonischemic cardiomyopathy severely reduced ejection fraction 19% LVEF normal at left ventricular function with episodes of symptomatic VT who is here for follow-up.  She is doing very well and denies any new complaints or issues.  Takes her medications as  prescribed.  She continues to take metoprolol XL 37-1/2 mg daily.  We will increase this to 50 mg daily at this time.  She does have the ICD in place, recent interrogation reviewed and there were no episodes of arrhythmias.  At this time we are not making any other medication changes, but she will get lab work on the way out from the clinic given that she had somewhat elevated potassium on most recent visit.  I am not worried about it however I think it is very reasonable to recheck to ensure it is within the normal range.  No other medication changes    # Chronic systolic heart failure/HFrEF secondary to NICM. EF bertha of 15-20%, improved to 35-40%  Stage C. NYHA Class III.  Fluid status: euvolemic, off loop diuretics   ACEi/ARB/ARNI: Entresto 49/51 BID  BB: Metoprolol succinate 37.5mg daily  Aldosterone: aldactone 25 mg daily   SGLT2i- Dapagliflozin 5mg daily  SCD prophylaxis:  s/p ICD placement 04/2024 (follows with EP)  NSAID use: contraindicated  Sleep apnea evaluation: previously referred to sleep clinic (outside referall)  Remote monitoring: Would consider in the future if more volume concerns, currently I don't think this is needed    # Non-obstructive CAD  - ASA 81  - Atorvastatin 20  - Metoprolol succinate 50mg       German Tavares MD  Cardiology Fellow    Plan Discussed with Attending Physician, Dr. Albaro Schilling.  Please see his attestation for final plan.           Please do not hesitate to contact me if you have any questions/concerns.     Sincerely,     Albaro Schilling MD

## 2024-10-23 ENCOUNTER — ANCILLARY PROCEDURE (OUTPATIENT)
Dept: CARDIOLOGY | Facility: CLINIC | Age: 74
End: 2024-10-23
Payer: MEDICARE

## 2024-10-23 DIAGNOSIS — Z95.810 CARDIAC RESYNCHRONIZATION THERAPY DEFIBRILLATOR (CRT-D) IN PLACE: ICD-10-CM

## 2024-10-23 PROCEDURE — 93295 DEV INTERROG REMOTE 1/2/MLT: CPT | Performed by: INTERNAL MEDICINE

## 2024-10-23 PROCEDURE — 93296 REM INTERROG EVL PM/IDS: CPT

## 2024-10-26 LAB
MDC_IDC_LEAD_CONNECTION_STATUS: NORMAL
MDC_IDC_LEAD_CONNECTION_STATUS: NORMAL
MDC_IDC_LEAD_IMPLANT_DT: NORMAL
MDC_IDC_LEAD_IMPLANT_DT: NORMAL
MDC_IDC_LEAD_LOCATION: NORMAL
MDC_IDC_LEAD_LOCATION: NORMAL
MDC_IDC_LEAD_LOCATION_DETAIL_1: NORMAL
MDC_IDC_LEAD_LOCATION_DETAIL_1: NORMAL
MDC_IDC_LEAD_MFG: NORMAL
MDC_IDC_LEAD_MFG: NORMAL
MDC_IDC_LEAD_MODEL: NORMAL
MDC_IDC_LEAD_MODEL: NORMAL
MDC_IDC_LEAD_POLARITY_TYPE: NORMAL
MDC_IDC_LEAD_POLARITY_TYPE: NORMAL
MDC_IDC_LEAD_SERIAL: NORMAL
MDC_IDC_LEAD_SERIAL: NORMAL
MDC_IDC_LEAD_SPECIAL_FUNCTION: NORMAL
MDC_IDC_LEAD_SPECIAL_FUNCTION: NORMAL
MDC_IDC_MSMT_BATTERY_DTM: NORMAL
MDC_IDC_MSMT_BATTERY_REMAINING_LONGEVITY: 148 MO
MDC_IDC_MSMT_BATTERY_RRT_TRIGGER: NORMAL
MDC_IDC_MSMT_BATTERY_VOLTAGE: 3.06 V
MDC_IDC_MSMT_CAP_CHARGE_DTM: NORMAL
MDC_IDC_MSMT_CAP_CHARGE_ENERGY: 18 J
MDC_IDC_MSMT_CAP_CHARGE_TIME: 3.6 S
MDC_IDC_MSMT_CAP_CHARGE_TYPE: NORMAL
MDC_IDC_MSMT_LEADCHNL_RA_IMPEDANCE_VALUE: 532 OHM
MDC_IDC_MSMT_LEADCHNL_RA_PACING_THRESHOLD_AMPLITUDE: 0.62 V
MDC_IDC_MSMT_LEADCHNL_RA_PACING_THRESHOLD_PULSEWIDTH: 0.4 MS
MDC_IDC_MSMT_LEADCHNL_RA_SENSING_INTR_AMPL: 4.5 MV
MDC_IDC_MSMT_LEADCHNL_RV_IMPEDANCE_VALUE: 399 OHM
MDC_IDC_MSMT_LEADCHNL_RV_IMPEDANCE_VALUE: 494 OHM
MDC_IDC_MSMT_LEADCHNL_RV_PACING_THRESHOLD_AMPLITUDE: 0.5 V
MDC_IDC_MSMT_LEADCHNL_RV_PACING_THRESHOLD_PULSEWIDTH: 0.4 MS
MDC_IDC_MSMT_LEADCHNL_RV_SENSING_INTR_AMPL: 25.4 MV
MDC_IDC_PG_IMPLANT_DTM: NORMAL
MDC_IDC_PG_MFG: NORMAL
MDC_IDC_PG_MODEL: NORMAL
MDC_IDC_PG_SERIAL: NORMAL
MDC_IDC_PG_TYPE: NORMAL
MDC_IDC_SESS_CLINIC_NAME: NORMAL
MDC_IDC_SESS_DTM: NORMAL
MDC_IDC_SESS_TYPE: NORMAL
MDC_IDC_SET_BRADY_AT_MODE_SWITCH_RATE: 171 {BEATS}/MIN
MDC_IDC_SET_BRADY_LOWRATE: 50 {BEATS}/MIN
MDC_IDC_SET_BRADY_MAX_SENSOR_RATE: 130 {BEATS}/MIN
MDC_IDC_SET_BRADY_MAX_TRACKING_RATE: 130 {BEATS}/MIN
MDC_IDC_SET_BRADY_MODE: NORMAL
MDC_IDC_SET_BRADY_PAV_DELAY_LOW: 180 MS
MDC_IDC_SET_BRADY_SAV_DELAY_LOW: 150 MS
MDC_IDC_SET_LEADCHNL_RA_PACING_AMPLITUDE: 1.5 V
MDC_IDC_SET_LEADCHNL_RA_PACING_ANODE_ELECTRODE_1: NORMAL
MDC_IDC_SET_LEADCHNL_RA_PACING_ANODE_LOCATION_1: NORMAL
MDC_IDC_SET_LEADCHNL_RA_PACING_CAPTURE_MODE: NORMAL
MDC_IDC_SET_LEADCHNL_RA_PACING_CATHODE_ELECTRODE_1: NORMAL
MDC_IDC_SET_LEADCHNL_RA_PACING_CATHODE_LOCATION_1: NORMAL
MDC_IDC_SET_LEADCHNL_RA_PACING_POLARITY: NORMAL
MDC_IDC_SET_LEADCHNL_RA_PACING_PULSEWIDTH: 0.4 MS
MDC_IDC_SET_LEADCHNL_RA_SENSING_ANODE_ELECTRODE_1: NORMAL
MDC_IDC_SET_LEADCHNL_RA_SENSING_ANODE_LOCATION_1: NORMAL
MDC_IDC_SET_LEADCHNL_RA_SENSING_CATHODE_ELECTRODE_1: NORMAL
MDC_IDC_SET_LEADCHNL_RA_SENSING_CATHODE_LOCATION_1: NORMAL
MDC_IDC_SET_LEADCHNL_RA_SENSING_POLARITY: NORMAL
MDC_IDC_SET_LEADCHNL_RA_SENSING_SENSITIVITY: 0.3 MV
MDC_IDC_SET_LEADCHNL_RV_PACING_AMPLITUDE: 2 V
MDC_IDC_SET_LEADCHNL_RV_PACING_ANODE_ELECTRODE_1: NORMAL
MDC_IDC_SET_LEADCHNL_RV_PACING_ANODE_LOCATION_1: NORMAL
MDC_IDC_SET_LEADCHNL_RV_PACING_CAPTURE_MODE: NORMAL
MDC_IDC_SET_LEADCHNL_RV_PACING_CATHODE_ELECTRODE_1: NORMAL
MDC_IDC_SET_LEADCHNL_RV_PACING_CATHODE_LOCATION_1: NORMAL
MDC_IDC_SET_LEADCHNL_RV_PACING_POLARITY: NORMAL
MDC_IDC_SET_LEADCHNL_RV_PACING_PULSEWIDTH: 0.4 MS
MDC_IDC_SET_LEADCHNL_RV_SENSING_ANODE_ELECTRODE_1: NORMAL
MDC_IDC_SET_LEADCHNL_RV_SENSING_ANODE_LOCATION_1: NORMAL
MDC_IDC_SET_LEADCHNL_RV_SENSING_CATHODE_ELECTRODE_1: NORMAL
MDC_IDC_SET_LEADCHNL_RV_SENSING_CATHODE_LOCATION_1: NORMAL
MDC_IDC_SET_LEADCHNL_RV_SENSING_POLARITY: NORMAL
MDC_IDC_SET_LEADCHNL_RV_SENSING_SENSITIVITY: 0.3 MV
MDC_IDC_SET_ZONE_DETECTION_BEATS_DENOMINATOR: 16 {BEATS}
MDC_IDC_SET_ZONE_DETECTION_BEATS_DENOMINATOR: 32 {BEATS}
MDC_IDC_SET_ZONE_DETECTION_BEATS_DENOMINATOR: 40 {BEATS}
MDC_IDC_SET_ZONE_DETECTION_BEATS_NUMERATOR: 16 {BEATS}
MDC_IDC_SET_ZONE_DETECTION_BEATS_NUMERATOR: 30 {BEATS}
MDC_IDC_SET_ZONE_DETECTION_BEATS_NUMERATOR: 32 {BEATS}
MDC_IDC_SET_ZONE_DETECTION_INTERVAL: 250 MS
MDC_IDC_SET_ZONE_DETECTION_INTERVAL: 300 MS
MDC_IDC_SET_ZONE_DETECTION_INTERVAL: 350 MS
MDC_IDC_SET_ZONE_DETECTION_INTERVAL: 360 MS
MDC_IDC_SET_ZONE_DETECTION_INTERVAL: 360 MS
MDC_IDC_SET_ZONE_STATUS: NORMAL
MDC_IDC_SET_ZONE_TYPE: NORMAL
MDC_IDC_SET_ZONE_VENDOR_TYPE: NORMAL
MDC_IDC_STAT_AT_BURDEN_PERCENT: 0 %
MDC_IDC_STAT_AT_DTM_END: NORMAL
MDC_IDC_STAT_AT_DTM_START: NORMAL
MDC_IDC_STAT_BRADY_AP_VP_PERCENT: 0 %
MDC_IDC_STAT_BRADY_AP_VS_PERCENT: 0.85 %
MDC_IDC_STAT_BRADY_AS_VP_PERCENT: 0.03 %
MDC_IDC_STAT_BRADY_AS_VS_PERCENT: 99.12 %
MDC_IDC_STAT_BRADY_DTM_END: NORMAL
MDC_IDC_STAT_BRADY_DTM_START: NORMAL
MDC_IDC_STAT_BRADY_RA_PERCENT_PACED: 1.17 %
MDC_IDC_STAT_BRADY_RV_PERCENT_PACED: 0.04 %
MDC_IDC_STAT_CRT_DTM_END: NORMAL
MDC_IDC_STAT_CRT_DTM_START: NORMAL
MDC_IDC_STAT_EPISODE_RECENT_COUNT: 0
MDC_IDC_STAT_EPISODE_RECENT_COUNT_DTM_END: NORMAL
MDC_IDC_STAT_EPISODE_RECENT_COUNT_DTM_START: NORMAL
MDC_IDC_STAT_EPISODE_TOTAL_COUNT: 0
MDC_IDC_STAT_EPISODE_TOTAL_COUNT: 2
MDC_IDC_STAT_EPISODE_TOTAL_COUNT_DTM_END: NORMAL
MDC_IDC_STAT_EPISODE_TOTAL_COUNT_DTM_START: NORMAL
MDC_IDC_STAT_EPISODE_TYPE: NORMAL
MDC_IDC_STAT_TACHYTHERAPY_ATP_DELIVERED_RECENT: 0
MDC_IDC_STAT_TACHYTHERAPY_ATP_DELIVERED_TOTAL: 0
MDC_IDC_STAT_TACHYTHERAPY_RECENT_DTM_END: NORMAL
MDC_IDC_STAT_TACHYTHERAPY_RECENT_DTM_START: NORMAL
MDC_IDC_STAT_TACHYTHERAPY_SHOCKS_ABORTED_RECENT: 0
MDC_IDC_STAT_TACHYTHERAPY_SHOCKS_ABORTED_TOTAL: 0
MDC_IDC_STAT_TACHYTHERAPY_SHOCKS_DELIVERED_RECENT: 0
MDC_IDC_STAT_TACHYTHERAPY_SHOCKS_DELIVERED_TOTAL: 0
MDC_IDC_STAT_TACHYTHERAPY_TOTAL_DTM_END: NORMAL
MDC_IDC_STAT_TACHYTHERAPY_TOTAL_DTM_START: NORMAL

## 2024-12-15 ENCOUNTER — HEALTH MAINTENANCE LETTER (OUTPATIENT)
Age: 74
End: 2024-12-15

## 2025-01-23 ENCOUNTER — ANCILLARY PROCEDURE (OUTPATIENT)
Dept: CARDIOLOGY | Facility: CLINIC | Age: 75
End: 2025-01-23
Payer: MEDICARE

## 2025-01-23 DIAGNOSIS — Z95.810 CARDIAC RESYNCHRONIZATION THERAPY DEFIBRILLATOR (CRT-D) IN PLACE: ICD-10-CM

## 2025-01-23 PROCEDURE — 93296 REM INTERROG EVL PM/IDS: CPT

## 2025-02-19 LAB
MDC_IDC_LEAD_CONNECTION_STATUS: NORMAL
MDC_IDC_LEAD_CONNECTION_STATUS: NORMAL
MDC_IDC_LEAD_IMPLANT_DT: NORMAL
MDC_IDC_LEAD_IMPLANT_DT: NORMAL
MDC_IDC_LEAD_LOCATION: NORMAL
MDC_IDC_LEAD_LOCATION: NORMAL
MDC_IDC_LEAD_LOCATION_DETAIL_1: NORMAL
MDC_IDC_LEAD_LOCATION_DETAIL_1: NORMAL
MDC_IDC_LEAD_MFG: NORMAL
MDC_IDC_LEAD_MFG: NORMAL
MDC_IDC_LEAD_MODEL: NORMAL
MDC_IDC_LEAD_MODEL: NORMAL
MDC_IDC_LEAD_POLARITY_TYPE: NORMAL
MDC_IDC_LEAD_POLARITY_TYPE: NORMAL
MDC_IDC_LEAD_SERIAL: NORMAL
MDC_IDC_LEAD_SERIAL: NORMAL
MDC_IDC_LEAD_SPECIAL_FUNCTION: NORMAL
MDC_IDC_LEAD_SPECIAL_FUNCTION: NORMAL
MDC_IDC_MSMT_BATTERY_DTM: NORMAL
MDC_IDC_MSMT_BATTERY_REMAINING_LONGEVITY: 146 MO
MDC_IDC_MSMT_BATTERY_RRT_TRIGGER: NORMAL
MDC_IDC_MSMT_BATTERY_STATUS: NORMAL
MDC_IDC_MSMT_BATTERY_VOLTAGE: 3.02 V
MDC_IDC_MSMT_CAP_CHARGE_DTM: NORMAL
MDC_IDC_MSMT_CAP_CHARGE_ENERGY: 18 J
MDC_IDC_MSMT_CAP_CHARGE_TIME: 3.6 S
MDC_IDC_MSMT_CAP_CHARGE_TYPE: NORMAL
MDC_IDC_MSMT_LEADCHNL_RA_IMPEDANCE_VALUE: 475 OHM
MDC_IDC_MSMT_LEADCHNL_RA_PACING_THRESHOLD_AMPLITUDE: 0.62 V
MDC_IDC_MSMT_LEADCHNL_RA_PACING_THRESHOLD_PULSEWIDTH: 0.4 MS
MDC_IDC_MSMT_LEADCHNL_RA_SENSING_INTR_AMPL: 3.9 MV
MDC_IDC_MSMT_LEADCHNL_RV_IMPEDANCE_VALUE: 399 OHM
MDC_IDC_MSMT_LEADCHNL_RV_IMPEDANCE_VALUE: 494 OHM
MDC_IDC_MSMT_LEADCHNL_RV_PACING_THRESHOLD_AMPLITUDE: 0.5 V
MDC_IDC_MSMT_LEADCHNL_RV_PACING_THRESHOLD_PULSEWIDTH: 0.4 MS
MDC_IDC_MSMT_LEADCHNL_RV_SENSING_INTR_AMPL: 25.5 MV
MDC_IDC_PG_IMPLANT_DTM: NORMAL
MDC_IDC_PG_MFG: NORMAL
MDC_IDC_PG_MODEL: NORMAL
MDC_IDC_PG_SERIAL: NORMAL
MDC_IDC_PG_TYPE: NORMAL
MDC_IDC_SESS_CLINIC_NAME: NORMAL
MDC_IDC_SESS_DTM: NORMAL
MDC_IDC_SESS_TYPE: NORMAL
MDC_IDC_SET_BRADY_AT_MODE_SWITCH_RATE: 171 {BEATS}/MIN
MDC_IDC_SET_BRADY_LOWRATE: 50 {BEATS}/MIN
MDC_IDC_SET_BRADY_MAX_SENSOR_RATE: 130 {BEATS}/MIN
MDC_IDC_SET_BRADY_MAX_TRACKING_RATE: 130 {BEATS}/MIN
MDC_IDC_SET_BRADY_MODE: NORMAL
MDC_IDC_SET_BRADY_PAV_DELAY_LOW: 180 MS
MDC_IDC_SET_BRADY_SAV_DELAY_LOW: 150 MS
MDC_IDC_SET_LEADCHNL_RA_PACING_AMPLITUDE: 1.5 V
MDC_IDC_SET_LEADCHNL_RA_PACING_ANODE_ELECTRODE_1: NORMAL
MDC_IDC_SET_LEADCHNL_RA_PACING_ANODE_LOCATION_1: NORMAL
MDC_IDC_SET_LEADCHNL_RA_PACING_CAPTURE_MODE: NORMAL
MDC_IDC_SET_LEADCHNL_RA_PACING_CATHODE_ELECTRODE_1: NORMAL
MDC_IDC_SET_LEADCHNL_RA_PACING_CATHODE_LOCATION_1: NORMAL
MDC_IDC_SET_LEADCHNL_RA_PACING_POLARITY: NORMAL
MDC_IDC_SET_LEADCHNL_RA_PACING_PULSEWIDTH: 0.4 MS
MDC_IDC_SET_LEADCHNL_RA_SENSING_ANODE_ELECTRODE_1: NORMAL
MDC_IDC_SET_LEADCHNL_RA_SENSING_ANODE_LOCATION_1: NORMAL
MDC_IDC_SET_LEADCHNL_RA_SENSING_CATHODE_ELECTRODE_1: NORMAL
MDC_IDC_SET_LEADCHNL_RA_SENSING_CATHODE_LOCATION_1: NORMAL
MDC_IDC_SET_LEADCHNL_RA_SENSING_POLARITY: NORMAL
MDC_IDC_SET_LEADCHNL_RA_SENSING_SENSITIVITY: 0.3 MV
MDC_IDC_SET_LEADCHNL_RV_PACING_AMPLITUDE: 2 V
MDC_IDC_SET_LEADCHNL_RV_PACING_ANODE_ELECTRODE_1: NORMAL
MDC_IDC_SET_LEADCHNL_RV_PACING_ANODE_LOCATION_1: NORMAL
MDC_IDC_SET_LEADCHNL_RV_PACING_CAPTURE_MODE: NORMAL
MDC_IDC_SET_LEADCHNL_RV_PACING_CATHODE_ELECTRODE_1: NORMAL
MDC_IDC_SET_LEADCHNL_RV_PACING_CATHODE_LOCATION_1: NORMAL
MDC_IDC_SET_LEADCHNL_RV_PACING_POLARITY: NORMAL
MDC_IDC_SET_LEADCHNL_RV_PACING_PULSEWIDTH: 0.4 MS
MDC_IDC_SET_LEADCHNL_RV_SENSING_ANODE_ELECTRODE_1: NORMAL
MDC_IDC_SET_LEADCHNL_RV_SENSING_ANODE_LOCATION_1: NORMAL
MDC_IDC_SET_LEADCHNL_RV_SENSING_CATHODE_ELECTRODE_1: NORMAL
MDC_IDC_SET_LEADCHNL_RV_SENSING_CATHODE_LOCATION_1: NORMAL
MDC_IDC_SET_LEADCHNL_RV_SENSING_POLARITY: NORMAL
MDC_IDC_SET_LEADCHNL_RV_SENSING_SENSITIVITY: 0.3 MV
MDC_IDC_SET_ZONE_DETECTION_BEATS_DENOMINATOR: 16 {BEATS}
MDC_IDC_SET_ZONE_DETECTION_BEATS_DENOMINATOR: 32 {BEATS}
MDC_IDC_SET_ZONE_DETECTION_BEATS_DENOMINATOR: 40 {BEATS}
MDC_IDC_SET_ZONE_DETECTION_BEATS_NUMERATOR: 16 {BEATS}
MDC_IDC_SET_ZONE_DETECTION_BEATS_NUMERATOR: 30 {BEATS}
MDC_IDC_SET_ZONE_DETECTION_BEATS_NUMERATOR: 32 {BEATS}
MDC_IDC_SET_ZONE_DETECTION_INTERVAL: 250 MS
MDC_IDC_SET_ZONE_DETECTION_INTERVAL: 300 MS
MDC_IDC_SET_ZONE_DETECTION_INTERVAL: 350 MS
MDC_IDC_SET_ZONE_DETECTION_INTERVAL: 360 MS
MDC_IDC_SET_ZONE_DETECTION_INTERVAL: 360 MS
MDC_IDC_SET_ZONE_STATUS: NORMAL
MDC_IDC_SET_ZONE_TYPE: NORMAL
MDC_IDC_SET_ZONE_VENDOR_TYPE: NORMAL
MDC_IDC_STAT_AT_BURDEN_PERCENT: 0 %
MDC_IDC_STAT_AT_DTM_END: NORMAL
MDC_IDC_STAT_AT_DTM_START: NORMAL
MDC_IDC_STAT_BRADY_DTM_END: NORMAL
MDC_IDC_STAT_BRADY_DTM_START: NORMAL
MDC_IDC_STAT_BRADY_RV_PERCENT_PACED: 0.03 %
MDC_IDC_STAT_CRT_DTM_END: NORMAL
MDC_IDC_STAT_CRT_DTM_START: NORMAL
MDC_IDC_STAT_EPISODE_RECENT_COUNT: 0
MDC_IDC_STAT_EPISODE_RECENT_COUNT_DTM_END: NORMAL
MDC_IDC_STAT_EPISODE_RECENT_COUNT_DTM_START: NORMAL
MDC_IDC_STAT_EPISODE_TOTAL_COUNT: 0
MDC_IDC_STAT_EPISODE_TOTAL_COUNT: 2
MDC_IDC_STAT_EPISODE_TOTAL_COUNT_DTM_END: NORMAL
MDC_IDC_STAT_EPISODE_TOTAL_COUNT_DTM_START: NORMAL
MDC_IDC_STAT_EPISODE_TYPE: NORMAL
MDC_IDC_STAT_TACHYTHERAPY_ATP_DELIVERED_RECENT: 0
MDC_IDC_STAT_TACHYTHERAPY_ATP_DELIVERED_TOTAL: 0
MDC_IDC_STAT_TACHYTHERAPY_RECENT_DTM_END: NORMAL
MDC_IDC_STAT_TACHYTHERAPY_RECENT_DTM_START: NORMAL
MDC_IDC_STAT_TACHYTHERAPY_SHOCKS_ABORTED_RECENT: 0
MDC_IDC_STAT_TACHYTHERAPY_SHOCKS_ABORTED_TOTAL: 0
MDC_IDC_STAT_TACHYTHERAPY_SHOCKS_DELIVERED_RECENT: 0
MDC_IDC_STAT_TACHYTHERAPY_SHOCKS_DELIVERED_TOTAL: 0
MDC_IDC_STAT_TACHYTHERAPY_TOTAL_DTM_END: NORMAL
MDC_IDC_STAT_TACHYTHERAPY_TOTAL_DTM_START: NORMAL

## 2025-03-16 ENCOUNTER — HEALTH MAINTENANCE LETTER (OUTPATIENT)
Age: 75
End: 2025-03-16

## 2025-03-17 ENCOUNTER — TELEPHONE (OUTPATIENT)
Dept: FAMILY MEDICINE | Facility: CLINIC | Age: 75
End: 2025-03-17
Payer: MEDICARE

## 2025-03-17 DIAGNOSIS — E11.9 TYPE 2 DIABETES MELLITUS WITHOUT COMPLICATION, WITHOUT LONG-TERM CURRENT USE OF INSULIN (H): Primary | ICD-10-CM

## 2025-03-17 NOTE — LETTER
March 18, 2025      Kayli Morillo  [address] [place] [id number]          Dear Kayli,     Here are the foot care resources we discussed on the phone. Please reach out to us if you have any further questions or concerns.     Marsha BORGES  [facility]  [phone]                                                Foot care resources    In home foot care:     [facility]  [phone]    [facility]  [phone]    [facility]  [phone]    Community based foot care:     [address]     [address] [facility]  2nd Wednesday by appointment.  [phone]    [facility]     [address] [facility] [place]  Every Friday and 2nd and 4th Tuesday  [phone]    [facility]     [address] [place] [address]  Every Tuesday  Phone: [phone]     Sincerely,

## 2025-03-18 RX ORDER — METFORMIN HYDROCHLORIDE 500 MG/1
500 TABLET, EXTENDED RELEASE ORAL
Qty: 90 TABLET | Refills: 1 | Status: SHIPPED | OUTPATIENT
Start: 2025-03-18 | End: 2025-09-14

## 2025-03-18 NOTE — TELEPHONE ENCOUNTER
Called patient to relay below message from covering provider. Patient requests the resources below are mailed to her. Copied the resources into a letter and sent today.

## 2025-03-18 NOTE — TELEPHONE ENCOUNTER
Prescription for metformin done.    Schedule annual wellness visit with Jeni Lynn MD in June or July and follow up.    Medicare generally does not cover nails to be cut by a podiatrist unless there are other foot or nail issues.    Foot care resources    In home foot care:    [facility]  [phone]    [facility]  [phone]    [facility]  [phone]    Community based foot care:    [address]    [address] [facility]  2nd Wednesday by appointment.  [phone]    [facility]    [address] [facility]  Every Friday and 2nd and 4th Tuesday  [phone]    [facility]    [address] [place] [address]  Every Tuesday  Phone: [phone]

## 2025-03-18 NOTE — TELEPHONE ENCOUNTER
"Patient confirms she is taking metformin ER 500mg. Confirmed once again that the medication was ER.     Patient also states that she has not had her nails cut in \"a very long time\" and would like help with foot care. Hx of DM2.     Pended medication and podiatry referral for provider review.         "

## 2025-03-18 NOTE — TELEPHONE ENCOUNTER
LMTCB. Please confirm if patient is currently taking as it is no longer active on meds list. Does she need refilled at this time? Thank you

## 2025-04-25 ENCOUNTER — ANCILLARY PROCEDURE (OUTPATIENT)
Dept: CARDIOLOGY | Facility: CLINIC | Age: 75
End: 2025-04-25
Payer: MEDICARE

## 2025-04-25 DIAGNOSIS — Z95.810 CARDIAC RESYNCHRONIZATION THERAPY DEFIBRILLATOR (CRT-D) IN PLACE: ICD-10-CM

## 2025-04-25 PROCEDURE — 93295 DEV INTERROG REMOTE 1/2/MLT: CPT | Performed by: INTERNAL MEDICINE

## 2025-04-25 PROCEDURE — 93296 REM INTERROG EVL PM/IDS: CPT

## 2025-04-28 LAB
MDC_IDC_EPISODE_DTM: NORMAL
MDC_IDC_EPISODE_DURATION: 1 S
MDC_IDC_EPISODE_ID: 3
MDC_IDC_EPISODE_TYPE: NORMAL
MDC_IDC_LEAD_CONNECTION_STATUS: NORMAL
MDC_IDC_LEAD_CONNECTION_STATUS: NORMAL
MDC_IDC_LEAD_IMPLANT_DT: NORMAL
MDC_IDC_LEAD_IMPLANT_DT: NORMAL
MDC_IDC_LEAD_LOCATION: NORMAL
MDC_IDC_LEAD_LOCATION: NORMAL
MDC_IDC_LEAD_LOCATION_DETAIL_1: NORMAL
MDC_IDC_LEAD_LOCATION_DETAIL_1: NORMAL
MDC_IDC_LEAD_MFG: NORMAL
MDC_IDC_LEAD_MFG: NORMAL
MDC_IDC_LEAD_MODEL: NORMAL
MDC_IDC_LEAD_MODEL: NORMAL
MDC_IDC_LEAD_POLARITY_TYPE: NORMAL
MDC_IDC_LEAD_POLARITY_TYPE: NORMAL
MDC_IDC_LEAD_SERIAL: NORMAL
MDC_IDC_LEAD_SERIAL: NORMAL
MDC_IDC_LEAD_SPECIAL_FUNCTION: NORMAL
MDC_IDC_LEAD_SPECIAL_FUNCTION: NORMAL
MDC_IDC_MSMT_BATTERY_DTM: NORMAL
MDC_IDC_MSMT_BATTERY_REMAINING_LONGEVITY: 144 MO
MDC_IDC_MSMT_BATTERY_RRT_TRIGGER: NORMAL
MDC_IDC_MSMT_BATTERY_VOLTAGE: 3.02 V
MDC_IDC_MSMT_CAP_CHARGE_DTM: NORMAL
MDC_IDC_MSMT_CAP_CHARGE_ENERGY: 18 J
MDC_IDC_MSMT_CAP_CHARGE_TIME: 3.7 S
MDC_IDC_MSMT_CAP_CHARGE_TYPE: NORMAL
MDC_IDC_MSMT_LEADCHNL_RA_IMPEDANCE_VALUE: 513 OHM
MDC_IDC_MSMT_LEADCHNL_RA_PACING_THRESHOLD_AMPLITUDE: 0.62 V
MDC_IDC_MSMT_LEADCHNL_RA_PACING_THRESHOLD_PULSEWIDTH: 0.4 MS
MDC_IDC_MSMT_LEADCHNL_RA_SENSING_INTR_AMPL: 4 MV
MDC_IDC_MSMT_LEADCHNL_RV_IMPEDANCE_VALUE: 437 OHM
MDC_IDC_MSMT_LEADCHNL_RV_IMPEDANCE_VALUE: 551 OHM
MDC_IDC_MSMT_LEADCHNL_RV_PACING_THRESHOLD_AMPLITUDE: 0.5 V
MDC_IDC_MSMT_LEADCHNL_RV_PACING_THRESHOLD_PULSEWIDTH: 0.4 MS
MDC_IDC_MSMT_LEADCHNL_RV_SENSING_INTR_AMPL: 28.9 MV
MDC_IDC_PG_IMPLANT_DTM: NORMAL
MDC_IDC_PG_MFG: NORMAL
MDC_IDC_PG_MODEL: NORMAL
MDC_IDC_PG_SERIAL: NORMAL
MDC_IDC_PG_TYPE: NORMAL
MDC_IDC_SESS_CLINIC_NAME: NORMAL
MDC_IDC_SESS_DTM: NORMAL
MDC_IDC_SESS_TYPE: NORMAL
MDC_IDC_SET_BRADY_AT_MODE_SWITCH_RATE: 171 {BEATS}/MIN
MDC_IDC_SET_BRADY_LOWRATE: 50 {BEATS}/MIN
MDC_IDC_SET_BRADY_MAX_SENSOR_RATE: 130 {BEATS}/MIN
MDC_IDC_SET_BRADY_MAX_TRACKING_RATE: 130 {BEATS}/MIN
MDC_IDC_SET_BRADY_MODE: NORMAL
MDC_IDC_SET_BRADY_PAV_DELAY_LOW: 180 MS
MDC_IDC_SET_BRADY_SAV_DELAY_LOW: 150 MS
MDC_IDC_SET_LEADCHNL_RA_PACING_AMPLITUDE: 1.5 V
MDC_IDC_SET_LEADCHNL_RA_PACING_ANODE_ELECTRODE_1: NORMAL
MDC_IDC_SET_LEADCHNL_RA_PACING_ANODE_LOCATION_1: NORMAL
MDC_IDC_SET_LEADCHNL_RA_PACING_CAPTURE_MODE: NORMAL
MDC_IDC_SET_LEADCHNL_RA_PACING_CATHODE_ELECTRODE_1: NORMAL
MDC_IDC_SET_LEADCHNL_RA_PACING_CATHODE_LOCATION_1: NORMAL
MDC_IDC_SET_LEADCHNL_RA_PACING_POLARITY: NORMAL
MDC_IDC_SET_LEADCHNL_RA_PACING_PULSEWIDTH: 0.4 MS
MDC_IDC_SET_LEADCHNL_RA_SENSING_ANODE_ELECTRODE_1: NORMAL
MDC_IDC_SET_LEADCHNL_RA_SENSING_ANODE_LOCATION_1: NORMAL
MDC_IDC_SET_LEADCHNL_RA_SENSING_CATHODE_ELECTRODE_1: NORMAL
MDC_IDC_SET_LEADCHNL_RA_SENSING_CATHODE_LOCATION_1: NORMAL
MDC_IDC_SET_LEADCHNL_RA_SENSING_POLARITY: NORMAL
MDC_IDC_SET_LEADCHNL_RA_SENSING_SENSITIVITY: 0.3 MV
MDC_IDC_SET_LEADCHNL_RV_PACING_AMPLITUDE: 2 V
MDC_IDC_SET_LEADCHNL_RV_PACING_ANODE_ELECTRODE_1: NORMAL
MDC_IDC_SET_LEADCHNL_RV_PACING_ANODE_LOCATION_1: NORMAL
MDC_IDC_SET_LEADCHNL_RV_PACING_CAPTURE_MODE: NORMAL
MDC_IDC_SET_LEADCHNL_RV_PACING_CATHODE_ELECTRODE_1: NORMAL
MDC_IDC_SET_LEADCHNL_RV_PACING_CATHODE_LOCATION_1: NORMAL
MDC_IDC_SET_LEADCHNL_RV_PACING_POLARITY: NORMAL
MDC_IDC_SET_LEADCHNL_RV_PACING_PULSEWIDTH: 0.4 MS
MDC_IDC_SET_LEADCHNL_RV_SENSING_ANODE_ELECTRODE_1: NORMAL
MDC_IDC_SET_LEADCHNL_RV_SENSING_ANODE_LOCATION_1: NORMAL
MDC_IDC_SET_LEADCHNL_RV_SENSING_CATHODE_ELECTRODE_1: NORMAL
MDC_IDC_SET_LEADCHNL_RV_SENSING_CATHODE_LOCATION_1: NORMAL
MDC_IDC_SET_LEADCHNL_RV_SENSING_POLARITY: NORMAL
MDC_IDC_SET_LEADCHNL_RV_SENSING_SENSITIVITY: 0.3 MV
MDC_IDC_SET_ZONE_DETECTION_BEATS_DENOMINATOR: 16 {BEATS}
MDC_IDC_SET_ZONE_DETECTION_BEATS_DENOMINATOR: 32 {BEATS}
MDC_IDC_SET_ZONE_DETECTION_BEATS_DENOMINATOR: 40 {BEATS}
MDC_IDC_SET_ZONE_DETECTION_BEATS_NUMERATOR: 16 {BEATS}
MDC_IDC_SET_ZONE_DETECTION_BEATS_NUMERATOR: 30 {BEATS}
MDC_IDC_SET_ZONE_DETECTION_BEATS_NUMERATOR: 32 {BEATS}
MDC_IDC_SET_ZONE_DETECTION_INTERVAL: 250 MS
MDC_IDC_SET_ZONE_DETECTION_INTERVAL: 300 MS
MDC_IDC_SET_ZONE_DETECTION_INTERVAL: 350 MS
MDC_IDC_SET_ZONE_DETECTION_INTERVAL: 360 MS
MDC_IDC_SET_ZONE_DETECTION_INTERVAL: 360 MS
MDC_IDC_SET_ZONE_STATUS: NORMAL
MDC_IDC_SET_ZONE_TYPE: NORMAL
MDC_IDC_SET_ZONE_VENDOR_TYPE: NORMAL
MDC_IDC_STAT_AT_BURDEN_PERCENT: 0 %
MDC_IDC_STAT_AT_DTM_END: NORMAL
MDC_IDC_STAT_AT_DTM_START: NORMAL
MDC_IDC_STAT_BRADY_DTM_END: NORMAL
MDC_IDC_STAT_BRADY_DTM_START: NORMAL
MDC_IDC_STAT_BRADY_RA_PERCENT_PACED: 2.2 %
MDC_IDC_STAT_BRADY_RV_PERCENT_PACED: 0.03 %
MDC_IDC_STAT_CRT_DTM_END: NORMAL
MDC_IDC_STAT_CRT_DTM_START: NORMAL
MDC_IDC_STAT_EPISODE_RECENT_COUNT: 0
MDC_IDC_STAT_EPISODE_RECENT_COUNT: 1
MDC_IDC_STAT_EPISODE_RECENT_COUNT_DTM_END: NORMAL
MDC_IDC_STAT_EPISODE_RECENT_COUNT_DTM_START: NORMAL
MDC_IDC_STAT_EPISODE_TOTAL_COUNT: 0
MDC_IDC_STAT_EPISODE_TOTAL_COUNT: 3
MDC_IDC_STAT_EPISODE_TOTAL_COUNT_DTM_END: NORMAL
MDC_IDC_STAT_EPISODE_TOTAL_COUNT_DTM_START: NORMAL
MDC_IDC_STAT_EPISODE_TYPE: NORMAL
MDC_IDC_STAT_TACHYTHERAPY_ATP_DELIVERED_RECENT: 0
MDC_IDC_STAT_TACHYTHERAPY_ATP_DELIVERED_TOTAL: 0
MDC_IDC_STAT_TACHYTHERAPY_RECENT_DTM_END: NORMAL
MDC_IDC_STAT_TACHYTHERAPY_RECENT_DTM_START: NORMAL
MDC_IDC_STAT_TACHYTHERAPY_SHOCKS_ABORTED_RECENT: 0
MDC_IDC_STAT_TACHYTHERAPY_SHOCKS_ABORTED_TOTAL: 0
MDC_IDC_STAT_TACHYTHERAPY_SHOCKS_DELIVERED_RECENT: 0
MDC_IDC_STAT_TACHYTHERAPY_SHOCKS_DELIVERED_TOTAL: 0
MDC_IDC_STAT_TACHYTHERAPY_TOTAL_DTM_END: NORMAL
MDC_IDC_STAT_TACHYTHERAPY_TOTAL_DTM_START: NORMAL

## 2025-06-01 ENCOUNTER — MYC MEDICAL ADVICE (OUTPATIENT)
Dept: CARDIOLOGY | Facility: CLINIC | Age: 75
End: 2025-06-01
Payer: MEDICARE

## 2025-06-03 ENCOUNTER — TELEPHONE (OUTPATIENT)
Dept: CARDIOLOGY | Facility: CLINIC | Age: 75
End: 2025-06-03
Payer: MEDICARE

## 2025-06-03 NOTE — TELEPHONE ENCOUNTER
Left Voicemail (1st Attempt) for the patient to call back and schedule the following:    Appointment type: Return Core  Provider: ANY AILYN  Return date: Next Mary  Specialty phone number: [phone] opt 1  Additional appointment(s) needed: Labs  Additonal Notes: NA

## 2025-06-05 NOTE — TELEPHONE ENCOUNTER
Sent PrairieSmartshart (2nd Attempt) for the patient to call back and schedule the following:    Appointment type: Return Core  Provider: ANY AILYN  Return date: Next Mary  Specialty phone number: [phone] opt 1  Additional appointment(s) needed: Labs  Additonal Notes: NA

## 2025-06-08 ENCOUNTER — TELEPHONE (OUTPATIENT)
Dept: FAMILY MEDICINE | Facility: CLINIC | Age: 75
End: 2025-06-08
Payer: MEDICARE

## 2025-06-08 DIAGNOSIS — R06.02 SOB (SHORTNESS OF BREATH): ICD-10-CM

## 2025-06-10 RX ORDER — ATORVASTATIN CALCIUM 20 MG/1
20 TABLET, FILM COATED ORAL DAILY
Qty: 90 TABLET | Refills: 3 | Status: SHIPPED | OUTPATIENT
Start: 2025-06-10

## 2025-06-23 ENCOUNTER — OFFICE VISIT (OUTPATIENT)
Dept: FAMILY MEDICINE | Facility: CLINIC | Age: 75
End: 2025-06-23
Payer: MEDICARE

## 2025-06-23 VITALS
OXYGEN SATURATION: 96 % | SYSTOLIC BLOOD PRESSURE: 134 MMHG | TEMPERATURE: 98.1 F | BODY MASS INDEX: 27.05 KG/M2 | HEIGHT: 59 IN | DIASTOLIC BLOOD PRESSURE: 80 MMHG | RESPIRATION RATE: 16 BRPM | HEART RATE: 84 BPM | WEIGHT: 134.2 LBS

## 2025-06-23 DIAGNOSIS — I10 BENIGN ESSENTIAL HYPERTENSION: ICD-10-CM

## 2025-06-23 DIAGNOSIS — I47.20 PAROXYSMAL VENTRICULAR TACHYCARDIA (H): ICD-10-CM

## 2025-06-23 DIAGNOSIS — Z00.00 ENCOUNTER FOR MEDICARE ANNUAL WELLNESS EXAM: Primary | ICD-10-CM

## 2025-06-23 DIAGNOSIS — I34.0 MITRAL VALVE INSUFFICIENCY, UNSPECIFIED ETIOLOGY: ICD-10-CM

## 2025-06-23 DIAGNOSIS — I50.20 HEART FAILURE WITH REDUCED EJECTION FRACTION, NYHA CLASS III (H): ICD-10-CM

## 2025-06-23 DIAGNOSIS — E11.69 TYPE 2 DIABETES MELLITUS WITH OTHER SPECIFIED COMPLICATION, WITHOUT LONG-TERM CURRENT USE OF INSULIN (H): ICD-10-CM

## 2025-06-23 DIAGNOSIS — E11.9 TYPE 2 DIABETES MELLITUS WITHOUT COMPLICATION, WITHOUT LONG-TERM CURRENT USE OF INSULIN (H): ICD-10-CM

## 2025-06-23 PROBLEM — M62.89 PELVIC FLOOR DYSFUNCTION: Status: ACTIVE | Noted: 2025-06-23

## 2025-06-23 PROBLEM — E66.3 OVERWEIGHT (BMI 25.0-29.9): Status: ACTIVE | Noted: 2021-10-27

## 2025-06-23 PROBLEM — J90 PLEURAL EFFUSION: Status: ACTIVE | Noted: 2025-06-23

## 2025-06-23 PROBLEM — Q21.10 ATRIAL SEPTAL DEFECT: Status: ACTIVE | Noted: 2025-06-23

## 2025-06-23 PROBLEM — I83.90 VARICOSE VEINS OF LOWER EXTREMITY: Status: ACTIVE | Noted: 2019-01-14

## 2025-06-23 PROBLEM — H35.30 MACULAR DEGENERATION: Status: ACTIVE | Noted: 2022-07-01

## 2025-06-23 PROBLEM — R06.09 DYSPNEA ON EXERTION: Status: ACTIVE | Noted: 2025-06-23

## 2025-06-23 PROBLEM — G47.33 OBSTRUCTIVE SLEEP APNEA SYNDROME: Status: ACTIVE | Noted: 2025-06-23

## 2025-06-23 PROBLEM — E78.2 MIXED HYPERLIPIDEMIA: Status: ACTIVE | Noted: 2023-03-10

## 2025-06-23 PROBLEM — H35.30 MACULAR DEGENERATION OF BOTH EYES: Status: ACTIVE | Noted: 2021-10-27

## 2025-06-23 PROBLEM — M85.88 OSTEOPENIA OF LUMBAR SPINE: Status: ACTIVE | Noted: 2019-05-31

## 2025-06-23 PROBLEM — I15.0 RENAL ARTERIAL HYPERTENSION: Status: ACTIVE | Noted: 2025-06-23

## 2025-06-23 PROBLEM — I25.5 GENERALIZED ISCHEMIC MYOCARDIAL DYSFUNCTION: Status: ACTIVE | Noted: 2025-06-23

## 2025-06-23 PROBLEM — H25.11 AGE-RELATED NUCLEAR CATARACT, RIGHT: Status: ACTIVE | Noted: 2020-09-22

## 2025-06-23 PROBLEM — R00.0 SINUS TACHYCARDIA: Status: ACTIVE | Noted: 2025-06-23

## 2025-06-23 PROBLEM — R92.8 OTHER ABNORMAL AND INCONCLUSIVE FINDINGS ON DIAGNOSTIC IMAGING OF BREAST: Status: ACTIVE | Noted: 2022-12-02

## 2025-06-23 PROBLEM — K21.9 GASTROESOPHAGEAL REFLUX DISEASE WITHOUT ESOPHAGITIS: Status: ACTIVE | Noted: 2019-01-14

## 2025-06-23 PROBLEM — I49.3: Status: ACTIVE | Noted: 2025-06-23

## 2025-06-23 PROBLEM — I42.8 TACHYCARDIA-INDUCED CARDIOMYOPATHY (H): Status: ACTIVE | Noted: 2025-06-23

## 2025-06-23 PROBLEM — I42.0 NONISCHEMIC CONGESTIVE CARDIOMYOPATHY (H): Status: ACTIVE | Noted: 2025-06-23

## 2025-06-23 PROBLEM — K63.5 POLYP OF COLON: Status: ACTIVE | Noted: 2025-06-23

## 2025-06-23 LAB
ALBUMIN SERPL BCG-MCNC: 3.9 G/DL (ref 3.5–5.2)
ALP SERPL-CCNC: 111 U/L (ref 40–150)
ALT SERPL W P-5'-P-CCNC: 18 U/L (ref 0–50)
ANION GAP SERPL CALCULATED.3IONS-SCNC: 10 MMOL/L (ref 7–15)
AST SERPL W P-5'-P-CCNC: 23 U/L (ref 0–45)
BILIRUB SERPL-MCNC: 0.5 MG/DL
BUN SERPL-MCNC: 16 MG/DL (ref 8–23)
CALCIUM SERPL-MCNC: 9.1 MG/DL (ref 8.8–10.4)
CHLORIDE SERPL-SCNC: 102 MMOL/L (ref 98–107)
CREAT SERPL-MCNC: 0.91 MG/DL (ref 0.51–0.95)
EGFRCR SERPLBLD CKD-EPI 2021: 65 ML/MIN/1.73M2
ERYTHROCYTE [DISTWIDTH] IN BLOOD BY AUTOMATED COUNT: 12.3 % (ref 10–15)
EST. AVERAGE GLUCOSE BLD GHB EST-MCNC: 364 MG/DL
GLUCOSE SERPL-MCNC: 346 MG/DL (ref 70–99)
HBA1C MFR BLD: 14.3 % (ref 0–5.6)
HCO3 SERPL-SCNC: 24 MMOL/L (ref 22–29)
HCT VFR BLD AUTO: 44.3 % (ref 35–47)
HGB BLD-MCNC: 14.7 G/DL (ref 11.7–15.7)
MCH RBC QN AUTO: 30.6 PG (ref 26.5–33)
MCHC RBC AUTO-ENTMCNC: 33.2 G/DL (ref 31.5–36.5)
MCV RBC AUTO: 92 FL (ref 78–100)
NT-PROBNP SERPL-MCNC: 236 PG/ML (ref 0–624)
PLATELET # BLD AUTO: 222 10E3/UL (ref 150–450)
POTASSIUM SERPL-SCNC: 4.5 MMOL/L (ref 3.4–5.3)
PROT SERPL-MCNC: 6.8 G/DL (ref 6.4–8.3)
RBC # BLD AUTO: 4.8 10E6/UL (ref 3.8–5.2)
SODIUM SERPL-SCNC: 136 MMOL/L (ref 135–145)
WBC # BLD AUTO: 6.1 10E3/UL (ref 4–11)

## 2025-06-23 PROCEDURE — 3079F DIAST BP 80-89 MM HG: CPT | Performed by: FAMILY MEDICINE

## 2025-06-23 PROCEDURE — 36415 COLL VENOUS BLD VENIPUNCTURE: CPT | Performed by: FAMILY MEDICINE

## 2025-06-23 PROCEDURE — 80053 COMPREHEN METABOLIC PANEL: CPT | Performed by: FAMILY MEDICINE

## 2025-06-23 PROCEDURE — 85027 COMPLETE CBC AUTOMATED: CPT | Performed by: FAMILY MEDICINE

## 2025-06-23 PROCEDURE — 3046F HEMOGLOBIN A1C LEVEL >9.0%: CPT | Performed by: FAMILY MEDICINE

## 2025-06-23 PROCEDURE — 83036 HEMOGLOBIN GLYCOSYLATED A1C: CPT | Performed by: FAMILY MEDICINE

## 2025-06-23 PROCEDURE — G0439 PPPS, SUBSEQ VISIT: HCPCS | Performed by: FAMILY MEDICINE

## 2025-06-23 PROCEDURE — 3075F SYST BP GE 130 - 139MM HG: CPT | Performed by: FAMILY MEDICINE

## 2025-06-23 PROCEDURE — 83880 ASSAY OF NATRIURETIC PEPTIDE: CPT | Performed by: FAMILY MEDICINE

## 2025-06-23 PROCEDURE — 99214 OFFICE O/P EST MOD 30 MIN: CPT | Mod: 25 | Performed by: FAMILY MEDICINE

## 2025-06-23 RX ORDER — SPIRONOLACTONE 25 MG/1
25 TABLET ORAL DAILY
Qty: 90 TABLET | Refills: 3 | Status: SHIPPED | OUTPATIENT
Start: 2025-06-23

## 2025-06-23 RX ORDER — LANCETS
EACH MISCELLANEOUS
Qty: 100 EACH | Refills: 6 | Status: SHIPPED | OUTPATIENT
Start: 2025-06-23

## 2025-06-23 RX ORDER — METFORMIN HYDROCHLORIDE 500 MG/1
500 TABLET, EXTENDED RELEASE ORAL 2 TIMES DAILY WITH MEALS
Qty: 180 TABLET | Refills: 3 | Status: SHIPPED | OUTPATIENT
Start: 2025-06-23

## 2025-06-23 SDOH — HEALTH STABILITY: PHYSICAL HEALTH
ON AVERAGE, HOW MANY DAYS PER WEEK DO YOU ENGAGE IN MODERATE TO STRENUOUS EXERCISE (LIKE A BRISK WALK)?: PATIENT DECLINED

## 2025-06-23 ASSESSMENT — SOCIAL DETERMINANTS OF HEALTH (SDOH): HOW OFTEN DO YOU GET TOGETHER WITH FRIENDS OR RELATIVES?: TWICE A WEEK

## 2025-06-23 NOTE — PROGRESS NOTES
Preventive Care Visit  [facility]  Jun 23, 2025      Assessment & Plan     Encounter for Medicare annual wellness exam  Patient here for medicare annual wellness. No concern for cognitive impairment, but did discuss safe driving practices and ways to assess for driving capabilities.     Type 2 diabetes mellitus with other specified complication, without long-term current use of insulin (H)  Chronic, not well controlled. Significant increase in A1c. Recommend urgent referral to diabetic educator for quick start of insulin, addition of other agents. Would consider GLP1 given cardiac history.   - HEMOGLOBIN A1C  - BASIC METABOLIC PANEL  - blood glucose monitoring (NO BRAND SPECIFIED) meter device kit  Dispense: 1 kit; Refill: 0  - blood glucose (NO BRAND SPECIFIED) test strip  Dispense: 100 strip; Refill: 6  - thin (NO BRAND SPECIFIED) lancets  Dispense: 100 each; Refill: 6  - Orthopedic  Referral  - HEMOGLOBIN A1C    Benign essential hypertension  Chronic, stable. Managed per cardiology.     Heart failure with reduced ejection fraction, NYHA class III (H)  Chronic, stable.   - Echocardiogram Complete  - spironolactone (ALDACTONE) 25 MG tablet  Dispense: 90 tablet; Refill: 3  - Comprehensive metabolic panel  - N terminal pro BNP outpatient  - CBC with platelets    Mitral valve insufficiency, unspecified etiology  Chronic, patient with significant fatigue. Recheck valvular function.   - Echocardiogram Complete    Paroxysmal ventricular tachycardia (H)  Chronic, stable.     Type 2 diabetes mellitus without complication, without long-term current use of insulin (H)  See above.   - metFORMIN (GLUCOPHAGE XR) 500 MG 24 hr tablet  Dispense: 180 tablet; Refill: 3      Patient has been advised of split billing requirements and indicates understanding: Yes        BMI  Estimated body mass index is 27.11 kg/m  as calculated from the following:    Height  "as of this encounter: 1.499 m (4' 11\").    Weight as of this encounter: 60.9 kg (134 lb 3.2 oz).     Reviewed preventive health counseling, as reflected in patient instructions  Counseling  Appropriate preventive services were addressed with this patient via screening, questionnaire, or discussion as appropriate for fall prevention, nutrition, physical activity, Tobacco-use cessation, social engagement, weight loss and cognition.  Checklist reviewing preventive services available has been given to the patient.  Reviewed patient's diet, addressing concerns and/or questions.   The patient was instructed to see the dentist every 6 months.   Discussed possible causes of fatigue. Addressed any concerns about safety while driving.        Follow-up    Follow-up Visit   Expected date:  Dec 23, 2025 (Approximate)      Follow Up Appointment Details:     Follow-up with whom?: PCP    Follow-Up for what?: Chronic Disease f/u    Chronic Disease f/u: Diabetes    How?: In Person    Is this an as-needed follow-up?: Yes             Follow-up Visit   Expected date:  Jun 30, 2026 (Approximate)      Follow Up Appointment Details:     Follow-up with whom?: PCP    Follow-Up for what?: Medicare Wellness    Welcome or Annual?: Annual Wellness    How?: In Person                 Barb Milan is a 74 year old, presenting for the following:  Annual Visit        6/23/2025     7:44 AM   Additional Questions   Roomed by CHRISTOPHER Holm        Via the Health Maintenance questionnaire, the patient has reported the following services have been completed -Eye Exam: BERTIN Curtis, not sure of name 2024-06-01, this information has been sent to the abstraction team.    HPI     DM 2  -Patient has not used a glucometer for quite some time.     Fatigue  -Patient reports increasing fatigue.   -Recent cardiac device interrogation  -Fatigue doing things around the house. She uses a lift on the bed, around 4 inches. This has not changed.   -Patient reports " pre-syncope two months ago.   -Also reports walking to daughters (five blocks away) with a walker, and reports she is significantly slower walking back. She also reports fatigue.     Advance Care Planning  Discussed advance care planning with patient; however, patient declined at this time.        6/23/2025   General Health   How would you rate your overall physical health? Good   Feel stress (tense, anxious, or unable to sleep) Only a little   (!) STRESS CONCERN      6/23/2025   Nutrition   Diet: Diabetic         6/23/2025   Exercise   Days per week of moderate/strenous exercise Patient declined         6/23/2025   Social Factors   Frequency of gathering with friends or relatives Twice a week   Worry food won't last until get money to buy more No   Food not last or not have enough money for food? No   Do you have housing? (Housing is defined as stable permanent housing and does not include staying outside in a car, in a tent, in an abandoned building, in an overnight shelter, or couch-surfing.) Yes   Are you worried about losing your housing? No   Lack of transportation? No   Unable to get utilities (heat,electricity)? No         6/23/2025   Fall Risk   Fallen 2 or more times in the past year? No   Trouble with walking or balance? No          6/23/2025   Activities of Daily Living- Home Safety   Needs help with the following daily activites None of the above   Safety concerns in the home None of the above         6/23/2025   Dental   Dentist two times every year? (!) NO         6/23/2025   Hearing Screening   Hearing concerns? None of the above         6/23/2025   Driving Risk Screening   Patient/family members have concerns about driving (!) YES - patient does not drive in the dark. Her  and daughter also do not like her driving in the dark. She likes to avoid the freeway.          6/23/2025   General Alertness/Fatigue Screening   Have you been more tired than usual lately? (!) YES - See HPI           6/23/2025   Urinary Incontinence Screening   Bothered by leaking urine in past 6 months No         Today's PHQ-2 Score:       6/23/2025     7:49 AM   PHQ-2 ( 1999 [employer])   Q1: Little interest or pleasure in doing things 0   Q2: Feeling down, depressed or hopeless 0   PHQ-2 Score 0           6/23/2025   Substance Use   Alcohol more than 3/day or more than 7/wk No   Do you have a current opioid prescription? No   How severe/bad is pain from 1 to 10? 0/10 (No Pain)   Do you use any other substances recreationally? No     Social History     Tobacco Use    Smoking status: Never     Passive exposure: Past    Smokeless tobacco: Never   Substance Use Topics    Drug use: Never     Mammogram Screening - After age 74- determine frequency with patient based on health status, life expectancy and patient goals    ASCVD Risk   The ASCVD Risk score (Xander ANDUJAR, et al., 2019) failed to calculate for the following reasons:    The valid total cholesterol range is 130 to 320 mg/dL      Reviewed and updated as needed this visit by Provider   Tobacco  Allergies  Meds  Problems  Med Hx  Surg Hx  Fam Hx  Soc   Hx Sexual Activity          Past Medical History:   Diagnosis Date    Chronic systolic heart failure (H)     Congestive heart failure (H) 3/2023    DM2 (diabetes mellitus, type 2) (H)     Gastroesophageal reflux disease without esophagitis     Osteopenia     Paroxysmal supraventricular tachycardia 3/2023     Past Surgical History:   Procedure Laterality Date    CV CORONARY ANGIOGRAM N/A 3/31/2023    Procedure: Coronary Angiogram;  Surgeon: Pastor Quarles MD;  Location:  HEART CARDIAC CATH LAB    CV RIGHT HEART CATH MEASUREMENTS RECORDED N/A 3/31/2023    Procedure: Right Heart Catheterization;  Surgeon: Pastor Quarles MD;  Location:  HEART CARDIAC CATH LAB    EP INSERT ICD N/A 4/16/2024    Procedure: Insert Implantable Cardioverter-Defibrillator (ICD);  Surgeon: Donte Samson MD;   Location:  HEART CARDIAC CATH LAB     Current providers sharing in care for this patient include:  Patient Care Team:  Jeni Lynn DO as PCP - General (Family Medicine)  Eden Pugh PA-C as Physician Assistant (Cardiovascular Disease)  Usha Martino, RN as Specialty Care Coordinator (Cardiology)  Darlene Barahona PA-C as Physician Assistant (Cardiology)  Donte Samson MD as MD (Cardiovascular Disease)  Jeni Lynn DO as Assigned PCP  Bhavesh Herrera, RN as Specialty Care Coordinator (Cardiology)  Albaro Schilling MD as Assigned Heart and Vascular Provider    The following health maintenance items are reviewed in Epic and correct as of today:  Health Maintenance   Topic Date Due    HF ACTION PLAN  Never done    ADVANCE CARE PLANNING  Never done    EYE EXAM  Never done    PNEUMOCOCCAL VACCINE 50+ YEARS (1 of 2 - PCV) Never done    DTAP/TDAP/TD VACCINE (1 - Tdap) Never done    ZOSTER VACCINE (1 of 2) Never done    MEDICARE ANNUAL WELLNESS VISIT  10/27/2021    RSV VACCINE (1 - 1-dose 75+ series) Never done    A1C  11/19/2024    BMP  02/27/2025    COVID-19 VACCINE (10 - 2024-25 season) 05/14/2025    LIPID  08/19/2025    MICROALBUMIN  08/19/2025    DIABETIC FOOT EXAM  08/19/2025    CBC  08/19/2025    ALT  08/27/2025    INFLUENZA VACCINE (Season Ended) 09/01/2025    ANNUAL REVIEW OF HM ORDERS  06/23/2026    FALL RISK ASSESSMENT  06/23/2026    DEXA  07/10/2034    TSH W/FREE T4 REFLEX  Completed    PHQ-2 (once per calendar year)  Completed    HPV VACCINE  Aged Out    MENINGITIS VACCINE  Aged Out    HEPATITIS C SCREENING  Discontinued    MAMMO SCREENING  Discontinued    COLORECTAL CANCER SCREENING  Discontinued         Review of Systems  Constitutional, neuro, ENT, endocrine, pulmonary, cardiac, gastrointestinal, genitourinary, musculoskeletal, integument and psychiatric systems are negative, except as otherwise noted.     Objective    Exam  /80   Pulse 84   Temp 98.1  F (36.7  C)  "(Oral)   Resp 16   Ht 1.499 m (4' 11\")   Wt 60.9 kg (134 lb 3.2 oz)   SpO2 96%   BMI 27.11 kg/m     Estimated body mass index is 27.11 kg/m  as calculated from the following:    Height as of this encounter: 1.499 m (4' 11\").    Weight as of this encounter: 60.9 kg (134 lb 3.2 oz).    Physical Exam  GENERAL: alert and no distress  EYES: Eyes grossly normal to inspection, PERRL and conjunctivae and sclerae normal  HENT: ear canals and TM's normal, nose and mouth without ulcers or lesions  NECK: no adenopathy, no asymmetry, masses, or scars  RESP: lungs clear to auscultation - no rales, rhonchi or wheezes  CV: regular rate and rhythm, normal S1 S2, no S3 or S4, no murmur, click or rub, no peripheral edema  ABDOMEN: soft, nontender, no hepatosplenomegaly, no masses and bowel sounds normal  MS: no gross musculoskeletal defects noted, no edema  SKIN: no suspicious lesions or rashes  Diabetic foot exam: normal DP and PT pulses, no trophic changes or ulcerative lesions, and normal sensory exam        6/23/2025   Mini Cog   Clock Draw Score 2 Normal   3 Item Recall 3 objects recalled   Mini Cog Total Score 5            Signed Electronically by: MOHAMUD PARISI DO    "

## 2025-06-23 NOTE — PATIENT INSTRUCTIONS
Please get the ultrasound of the heart to look for worsening valve disease. I will message Dr. Avila.     Patient Education   Preventive Care Advice   This is general advice given by our system to help you stay healthy. However, your care team may have specific advice just for you. Please talk to your care team about your preventive care needs.  Nutrition  Eat 5 or more servings of fruits and vegetables each day.  Try wheat bread, brown rice and whole grain pasta (instead of white bread, rice, and pasta).  Get enough calcium and vitamin D. Check the label on foods and aim for 100% of the RDA (recommended daily allowance).  Lifestyle  Exercise at least 150 minutes each week  (30 minutes a day, 5 days a week).  Do muscle strengthening activities 2 days a week. These help control your weight and prevent disease.  No smoking.  Wear sunscreen to prevent skin cancer.  Have a dental exam and cleaning every 6 months.  Yearly exams  See your health care team every year to talk about:  Any changes in your health.  Any medicines your care team has prescribed.  Preventive care, family planning, and ways to prevent chronic diseases.  Shots (vaccines)   HPV shots (up to age 26), if you've never had them before.  Hepatitis B shots (up to age 59), if you've never had them before.  COVID-19 shot: Get this shot when it's due.  Flu shot: Get a flu shot every year.  Tetanus shot: Get a tetanus shot every 10 years.  Pneumococcal, hepatitis A, and RSV shots: Ask your care team if you need these based on your risk.  Shingles shot (for age 50 and up)  General health tests  Diabetes screening:  Starting at age 35, Get screened for diabetes at least every 3 years.  If you are younger than age 35, ask your care team if you should be screened for diabetes.  Cholesterol test: At age 39, start having a cholesterol test every 5 years, or more often if advised.  Bone density scan (DEXA): At age 50, ask your care team if you should have  this scan for osteoporosis (brittle bones).  Hepatitis C: Get tested at least once in your life.  STIs (sexually transmitted infections)  Before age 24: Ask your care team if you should be screened for STIs.  After age 24: Get screened for STIs if you're at risk. You are at risk for STIs (including HIV) if:  You are sexually active with more than one person.  You don't use condoms every time.  You or a partner was diagnosed with a sexually transmitted infection.  If you are at risk for HIV, ask about PrEP medicine to prevent HIV.  Get tested for HIV at least once in your life, whether you are at risk for HIV or not.  Cancer screening tests  Cervical cancer screening: If you have a cervix, begin getting regular cervical cancer screening tests starting at age 21.  Breast cancer scan (mammogram): If you've ever had breasts, begin having regular mammograms starting at age 40. This is a scan to check for breast cancer.  Colon cancer screening: It is important to start screening for colon cancer at age 45.  Have a colonoscopy test every 10 years (or more often if you're at risk) Or, ask your provider about stool tests like a FIT test every year or Cologuard test every 3 years.  To learn more about your testing options, visit:   .  For help making a decision, visit:   https://bit.ly/us79849.  Prostate cancer screening test: If you have a prostate, ask your care team if a prostate cancer screening test (PSA) at age 55 is right for you.  Lung cancer screening: If you are a current or former smoker ages 50 to 80, ask your care team if ongoing lung cancer screenings are right for you.  For informational purposes only. Not to replace the advice of your health care provider. Copyright   2023 [employer]. All rights reserved. Clinically reviewed by the [facility]. [employer] [id number] - REV 01/24.  Learning About Sleeping Well  What does sleeping well mean?     Sleeping well means getting  enough sleep to feel good and stay healthy. How much sleep is enough varies among people.  The number of hours you sleep and how you feel when you wake up are both important. If you do not feel refreshed, you probably need more sleep. Another sign of not getting enough sleep is feeling tired during the day.  Experts recommend that adults get at least 7 or more hours of sleep per day. Children and older adults need more sleep.  Why is getting enough sleep important?  Getting enough quality sleep is a basic part of good health. When your sleep suffers, your physical health, mood, and your thoughts can suffer too. You may find yourself feeling more grumpy or stressed. Not getting enough sleep also can lead to serious problems, including injury, accidents, anxiety, and depression.  What might cause poor sleeping?  Many things can cause sleep problems, including:  Changes to your sleep schedule.  Stress. Stress can be caused by fear about a single event, such as giving a speech. Or you may have ongoing stress, such as worry about work or school.  Depression, anxiety, and other mental or emotional conditions.  Changes in your sleep habits or surroundings. This includes changes that happen where you sleep, such as noise, light, or sleeping in a different bed. It also includes changes in your sleep pattern, such as having jet lag or working a late shift.  Health problems, such as pain, breathing problems, and restless legs syndrome.  Lack of regular exercise.  Using alcohol, nicotine, or caffeine before bed.  How can you help yourself?  Here are some tips that may help you sleep more soundly and wake up feeling more refreshed.  Your sleeping area   Use your bedroom only for sleeping and sex. A bit of light reading may help you fall asleep. But if it doesn't, do your reading elsewhere in the house. Try not to use your TV, computer, smartphone, or tablet while you are in bed.  Be sure your bed is big enough to stretch out  "comfortably, especially if you have a sleep partner.  Keep your bedroom quiet, dark, and cool. Use curtains, blinds, or a sleep mask to block out light. To block out noise, use earplugs, soothing music, or a \"white noise\" machine.  Your evening and bedtime routine   Create a relaxing bedtime routine. You might want to take a warm shower or bath, or listen to soothing music.  Go to bed at the same time every night. And get up at the same time every morning, even if you feel tired.  What to avoid   Limit caffeine (coffee, tea, caffeinated sodas) during the day, and don't have any for at least 6 hours before bedtime.  Avoid drinking alcohol before bedtime. Alcohol can cause you to wake up more often during the night.  Try not to smoke or use tobacco, especially in the evening. Nicotine can keep you awake.  Limit naps during the day, especially close to bedtime.  Avoid lying in bed awake for too long. If you can't fall asleep or if you wake up in the middle of the night and can't get back to sleep within about 20 minutes, get out of bed and go to another room until you feel sleepy.  Avoid taking medicine right before bed that may keep you awake or make you feel hyper or energized. Your doctor can tell you if your medicine may do this and if you can take it earlier in the day.  If you can't sleep   Imagine yourself in a peaceful, pleasant scene. Focus on the details and feelings of being in a place that is relaxing.  Get up and do a quiet or boring activity until you feel sleepy.  Avoid drinking any liquids before going to bed to help prevent waking up often to use the bathroom.  Where can you learn more?  Go to https://www.[employer].net/patiented  Enter J942 in the search box to learn more about \"Learning About Sleeping Well.\"  Current as of: July 31, 2024  Content Version: 14.5    8862-4930 [employer].   Care instructions adapted under license by your healthcare professional. If you have questions about a " medical condition or this instruction, always ask your healthcare professional. [employer], [employer] disclaims any warranty or liability for your use of this information.

## 2025-06-24 ENCOUNTER — PATIENT OUTREACH (OUTPATIENT)
Dept: CARE COORDINATION | Facility: CLINIC | Age: 75
End: 2025-06-24
Payer: MEDICARE

## 2025-06-26 ENCOUNTER — PATIENT OUTREACH (OUTPATIENT)
Dept: CARE COORDINATION | Facility: CLINIC | Age: 75
End: 2025-06-26
Payer: MEDICARE

## 2025-07-23 DIAGNOSIS — I50.20 HEART FAILURE WITH REDUCED EJECTION FRACTION, NYHA CLASS III (H): Primary | ICD-10-CM

## 2025-07-28 ENCOUNTER — TELEPHONE (OUTPATIENT)
Dept: CARDIOLOGY | Facility: CLINIC | Age: 75
End: 2025-07-28
Payer: MEDICARE

## 2025-07-29 NOTE — PROGRESS NOTES
HPI:   Ms. Morillo is a 74 year old female with a past medical history including HFrEF 2/2 NICM, nonobstructive CAD from 3/2023 invasive coronary angiogram, and T2DM who presents for evaluation of chronic systolic heart failure.    She was seen by Dr. Schilling on 8/27/24. She was doing very well. Her metoprolol was increased. EF had improved from 15-20% to 35-40%.    Today  She has been feeling well since she saw Dr. Schilling last August- almost one year ago. 6 weeks ago she had an episode of pre-syncope and she was feeling pretty fatigued- that resolved and since then has been feeling back to baseline. She thinks she had missed a few days of her medications around that time. She has been feeling better since going back on the medications.      No SOB at rest.  Stable exercise tolerance. She has been walking to the mailbox and using a seated peddler for about 5 minutes at least once a day and sometimes twice a day. No LE edema (exam is slightly different) or abdominal edema. Her orthopnea is resolved. No more PND. No palpitations. No chest pain.     She has been off entresto for close to a year- she was on patient assistance for a time and she no longer qualifies.     At home her blood pressure has been 104//80s.    Weights has been around 130 lbs at home.     Cardiac Medications  Aldactone 25 mg daily  Metoprolol succinate 50 mg daily  Entresto 49-51 mg BID- hasn't been taking for about one year  Lipitor 20 mg daily  ASA 81 mg daily    PAST MEDICAL HISTORY:  Past Medical History:   Diagnosis Date    Chronic systolic heart failure (H)     Congestive heart failure (H) 3/2023    DM2 (diabetes mellitus, type 2) (H)     Gastroesophageal reflux disease without esophagitis     Osteopenia     Paroxysmal supraventricular tachycardia 3/2023       FAMILY HISTORY:  Family History   Problem Relation Age of Onset    Diabetes Mother     Cerebrovascular Disease Father     Alzheimer Disease Father        SOCIAL HISTORY:  Social  History     Socioeconomic History    Marital status:    Tobacco Use    Smoking status: Never     Passive exposure: Past    Smokeless tobacco: Never       CURRENT MEDICATIONS:  Current Outpatient Medications   Medication Sig Dispense Refill    aspirin (ASA) 81 MG chewable tablet       atorvastatin (LIPITOR) 20 MG tablet Take 1 tablet (20 mg) by mouth daily. 90 tablet 3    blood glucose (NO BRAND SPECIFIED) test strip Use to test blood sugar 1 times daily or as directed. To accompany: Blood Glucose Monitor Brands: per insurance. 100 strip 6    blood glucose monitoring (NO BRAND SPECIFIED) meter device kit Use to test blood sugar 1 times daily or as directed. Preferred blood glucose meter OR supplies to accompany: Blood Glucose Monitor Brands: per insurance. 1 kit 0    Calcium Carb-Cholecalciferol 500-10 MG-MCG TABS       losartan (COZAAR) 25 MG tablet Take 1 tablet (25 mg) by mouth daily. 90 tablet 1    metFORMIN (GLUCOPHAGE XR) 500 MG 24 hr tablet Take 1 tablet (500 mg) by mouth 2 times daily (with meals). 180 tablet 3    metFORMIN (GLUCOPHAGE) 500 MG tablet 2 times daily (with meals)      metoprolol succinate ER (TOPROL XL) 50 MG 24 hr tablet Take 1 tablet (50 mg) by mouth daily. 90 tablet 3    Multiple Vitamins-Minerals (ICAPS AREDS 2 PO)       spironolactone (ALDACTONE) 25 MG tablet Take 1 tablet (25 mg) by mouth daily. 90 tablet 3    thin (NO BRAND SPECIFIED) lancets Use with lancing device to check glucose 1 times daily per  direction. To accompany: Blood Glucose Monitor Brands: per insurance. 100 each 6    acetaminophen-codeine (TYLENOL #3) 300-30 MG per tablet Take 1 tablet by mouth every 4 hours as needed for moderate pain 12 tablet 0     No current facility-administered medications for this visit.       ROS:   Refer to HPI    EXAM:  /69 (BP Location: Right arm, Patient Position: Chair, Cuff Size: Adult Regular)   Pulse 72   Wt 59.9 kg (132 lb 1.6 oz)   SpO2 97%   BMI 26.68 kg/m   "  GENERAL: Appears comfortable, in no acute distress.   HEENT: Eye symmetrical, no discharge or icterus bilaterally.   CV: RRR, +S1S2, no murmur, rub, or gallop. JVP <6.   RESPIRATORY: Respirations regular, even, and unlabored. Lungs CTA throughout.   GI: Soft and non distended   EXTREMITIES: Trace b/l lower extremity peripheral edema. All extremities are warm and well perfused  NEUROLOGIC: Alert and interacting appropriately.   SKIN: No jaundice.      Labs, reviewed with patient in clinic today:  CBC RESULTS:  Lab Results   Component Value Date    WBC 6.1 06/23/2025    RBC 4.80 06/23/2025    HGB 14.7 06/23/2025    HCT 44.3 06/23/2025    MCV 92 06/23/2025    MCH 30.6 06/23/2025    MCHC 33.2 06/23/2025    RDW 12.3 06/23/2025     06/23/2025       CMP RESULTS:  Lab Results   Component Value Date     07/30/2025    POTASSIUM 4.6 07/30/2025    CHLORIDE 102 07/30/2025    CHLORIDE 104.3 12/18/2023    CO2 24 07/30/2025    ANIONGAP 13 07/30/2025     (H) 07/30/2025     (H) 03/31/2023    BUN 19.6 07/30/2025    CR 0.94 07/30/2025    GFRESTIMATED 63 07/30/2025    JULIANNE 11.0 (H) 07/30/2025    BILITOTAL 0.5 06/23/2025    ALBUMIN 3.9 06/23/2025    ALKPHOS 111 06/23/2025    ALT 18 06/23/2025    AST 23 06/23/2025        INR RESULTS:  Lab Results   Component Value Date    INR 1.01 03/31/2023       No results found for: \"MAG\"  No results found for: \"NTBNPI\"  Lab Results   Component Value Date    NTBNP 236 06/23/2025       Diagnostics:   10/17/23 ECHO  Interpretation Summary  Mild left ventricular dilation is present. Left ventricular function is  decreased. The ejection fraction is 30-35% (moderately reduced). Abnormal  septal motion consistent with left bundle branch block is present. Moderate  diffuse hypokinesis is present.  Global right ventricular function is normal.  Mild to moderate mitral insufficiency is present.  Pulmonary artery systolic pressure is normal.  Estimated mean right atrial pressure is " normal.  No pericardial effusion is present.    5/25/23 cMRI  1. The left ventricle is severely dilated. There is increased trabeculations in the left ventricle. There  is severe global hypokinesis. The systolic function severely reduced. The LVEF is traced at 19%.      2. The right ventricle is normal in cavity size. The global systolic function is normal. The RVEF is 51%.      3. The left atrium is severely enlarged. The right atrium is normal in size.      4. There is at least moderate functional mitral regurgitation.     5. There is mild hyperenhancement in the basal septum in the pattern of mid-myocardial stripe consistent  with non ischemic fibrosis.    T1 mapping - ECV 33% (elevated).   T2 mapping - no myocardial edema present.      6. There is trivial pericardial effusion.     7. There is no intracardiac thrombus.     CONCLUSIONS:   Non-ischemic cardiomyopathy with severe LV dysfunction and normal right ventricular function, LVEF of 19%  and RVEF 51%.  No evidence of myocardial edema.      CORE EXAM    3/16/23 ECHO  Interpretation Summary  Moderate left ventricular dilation (LVIDd 6.1cm). Severe diffuse hypokinesis.  Biplane LVEF is 14.3%.  The right ventricle is normal size and function.  Moderate mitral insufficiency is present due to dilated left ventricle.  Moderate pulmonary hypertension. Right ventricular systolic pressure is 52  mmHg.  The inferior vena cava was normal in size with preserved respiratory  variability.     There is no prior study for direct comparison. Dr. Quarles informed of the  Findings.    3/31/23 Coronary angiogram and RHC  Diagnostic  Dominance: Right  Left Main   The vessel was visualized by selective angiography, is moderate in size and is angiographically normal.      Left Anterior Descending   The vessel is moderate in size.   Prox LAD lesion is 20% stenosed. The lesion is focal.   Prox LAD to Mid LAD lesion is 25% stenosed with 65% stenosed side branch in 1st Diag.       First Diagonal Branch   The vessel is moderate in size.      First Septal Branch   The vessel is small and is angiographically normal.      Second Diagonal Branch   The vessel is small.      Second Septal Branch   The vessel is small and is angiographically normal.      Third Diagonal Branch   The vessel is small and is angiographically normal.      Left Circumflex   The vessel is moderate in size and is angiographically normal.      First Obtuse Marginal Branch   The vessel is small and is angiographically normal.      Second Obtuse Marginal Branch   The vessel is moderate in size.   2nd Mrg lesion is 40% stenosed.      Lateral Second Obtuse Marginal Branch   The vessel is moderate in size and is angiographically normal.      Third Obtuse Marginal Branch   The vessel is moderate in size and is angiographically normal.      Fourth Obtuse Marginal Branch   The vessel is small and is angiographically normal.      Right Coronary Artery   The vessel was visualized by selective angiography and is moderate in size.   Mid RCA lesion is 20% stenosed.      Acute Marginal Branch   The vessel is moderate in size and is angiographically normal.      Right Ventricular Branch   The vessel is small and is angiographically normal.      Right Posterior Descending Artery   The vessel is moderate in size and is angiographically normal.      Right Posterior Atrioventricular Artery   The vessel is small and is angiographically normal.      NIBP 117/56/78  RA --/--/3  RV 42/3  PA 42/14/24  PCWP --/--/10  PA Sat 62%  West CO/CI 3.5/2.2  TD CO/CI 3.6/2.3  SVR 1444 (TD) dynes  PVR 3.6 (TD) AMOR     Right sided filling pressures are normal. Left sided filling pressures are normal. Mild elevated pulmonary hypertension. Normal cardiac output level. Hemodynamic data has been modified in Epic per physician review.      Assessment and Plan:   Ms. Morillo is a 74 year old female with a past medical history including HFrEF 2/2 NICM, nonobstructive  CAD from 3/2023 invasive coronary angiogram, and T2DM who presents for evaluation of chronic systolic heart failure.    Symptomatically, she is doing okay. She has been of ARNI for almost one year d/t cost reasons. We will start losartan today and titrate as possible. She is getting an echo in a few weeks    # Chronic systolic heart failure/HFrEF secondary to NICM. EF bertha of 15-20%, improved to 35-40% as of 7/18/24  Stage C. NYHA Class III.    Fluid status: euvolemic, off loop diuretics   ACEi/ARB/ARNI: off entresto d/t cost, start losartan 25 mg daily  BB: metoprolol succinate to 50 mg daily, ongoing titration, but will focus on losartn today  Aldosterone: aldactone 25 mg daily   SGLT2i- cost prohibitive- has tried applying to patient assistance in the past as well but does not qualify  SCD prophylaxis: EF >35%  NSAID use: contraindicated  Sleep apnea evaluation: referred to sleep clinic previously (outside referall)  Remote monitoring: Would consider in the future if more volume concerns, currently I don't think this is needed    # T2DM  - Last A1C was 14 from 7 the year prior- she is working on making significant dietary changes  - Will need to communicate with her PCP if we are able to add an SGLT2i (she is on metformin). Currently cost prohibitive.    # Non-obstructive coronary artery disease as per 2023 angiogram  - Continue ASA and lipitor    # CKD stage 2  - Cr 0.94, stable with recent b/l    # Hypercalcermia  - PCP has already reached out d/t elevation and are planning on work-up    Follow up   - BMP in 2 weeks- started entresto  - ECHO in 2 week as scheduled  - RN phone call in 2 weeks, plan to increase losartan if possible  - EP as scheduled 8/20/25  - CORE in 3 months    Billing  - I managed 2+ stable chronic conditions  - I changed a prescription medication    The longitudinal plan of care for the diagnosis(es)/condition(s) as documented were addressed during this visit. Due to the added complexity in  care, I will continue to support Kayli in the subsequent management and with ongoing continuity of care.        Eden Pugh PA-C  [facility]          CC  JOSHUA, ANA PERKINS

## 2025-07-30 ENCOUNTER — OFFICE VISIT (OUTPATIENT)
Dept: CARDIOLOGY | Facility: CLINIC | Age: 75
End: 2025-07-30
Attending: PHYSICIAN ASSISTANT
Payer: MEDICARE

## 2025-07-30 ENCOUNTER — LAB (OUTPATIENT)
Dept: LAB | Facility: CLINIC | Age: 75
End: 2025-07-30
Attending: PHYSICIAN ASSISTANT
Payer: MEDICARE

## 2025-07-30 ENCOUNTER — RESULTS FOLLOW-UP (OUTPATIENT)
Dept: FAMILY MEDICINE | Facility: CLINIC | Age: 75
End: 2025-07-30

## 2025-07-30 VITALS
BODY MASS INDEX: 26.68 KG/M2 | HEART RATE: 72 BPM | SYSTOLIC BLOOD PRESSURE: 135 MMHG | WEIGHT: 132.1 LBS | DIASTOLIC BLOOD PRESSURE: 69 MMHG | OXYGEN SATURATION: 97 %

## 2025-07-30 DIAGNOSIS — E11.3393: ICD-10-CM

## 2025-07-30 DIAGNOSIS — I50.20 HEART FAILURE WITH REDUCED EJECTION FRACTION, NYHA CLASS III (H): ICD-10-CM

## 2025-07-30 DIAGNOSIS — Z13.6 SCREENING FOR CARDIOVASCULAR CONDITION: Primary | ICD-10-CM

## 2025-07-30 DIAGNOSIS — E11.65 TYPE 2 DIABETES MELLITUS WITH HYPERGLYCEMIA, WITHOUT LONG-TERM CURRENT USE OF INSULIN (H): ICD-10-CM

## 2025-07-30 DIAGNOSIS — I50.20 HEART FAILURE WITH REDUCED EJECTION FRACTION, NYHA CLASS III (H): Primary | ICD-10-CM

## 2025-07-30 DIAGNOSIS — E83.52 HYPERCALCEMIA: Primary | ICD-10-CM

## 2025-07-30 LAB
ANION GAP SERPL CALCULATED.3IONS-SCNC: 13 MMOL/L (ref 7–15)
BUN SERPL-MCNC: 19.6 MG/DL (ref 8–23)
CALCIUM SERPL-MCNC: 11 MG/DL (ref 8.8–10.4)
CHLORIDE SERPL-SCNC: 102 MMOL/L (ref 98–107)
CHOLEST SERPL-MCNC: 100 MG/DL
CREAT SERPL-MCNC: 0.94 MG/DL (ref 0.51–0.95)
CREAT UR-MCNC: 225 MG/DL
EGFRCR SERPLBLD CKD-EPI 2021: 63 ML/MIN/1.73M2
FASTING STATUS PATIENT QL REPORTED: ABNORMAL
FASTING STATUS PATIENT QL REPORTED: ABNORMAL
GLUCOSE SERPL-MCNC: 221 MG/DL (ref 70–99)
HCO3 SERPL-SCNC: 24 MMOL/L (ref 22–29)
HDLC SERPL-MCNC: 47 MG/DL
LDLC SERPL CALC-MCNC: 40 MG/DL
MICROALBUMIN UR-MCNC: 40.9 MG/L
MICROALBUMIN/CREAT UR: 18.18 MG/G CR (ref 0–25)
NONHDLC SERPL-MCNC: 53 MG/DL
POTASSIUM SERPL-SCNC: 4.6 MMOL/L (ref 3.4–5.3)
SODIUM SERPL-SCNC: 139 MMOL/L (ref 135–145)
TRIGL SERPL-MCNC: 63 MG/DL

## 2025-07-30 PROCEDURE — 80048 BASIC METABOLIC PNL TOTAL CA: CPT | Performed by: PATHOLOGY

## 2025-07-30 PROCEDURE — 99000 SPECIMEN HANDLING OFFICE-LAB: CPT | Performed by: PATHOLOGY

## 2025-07-30 PROCEDURE — G0463 HOSPITAL OUTPT CLINIC VISIT: HCPCS | Performed by: PHYSICIAN ASSISTANT

## 2025-07-30 PROCEDURE — 36415 COLL VENOUS BLD VENIPUNCTURE: CPT | Performed by: PATHOLOGY

## 2025-07-30 PROCEDURE — 82570 ASSAY OF URINE CREATININE: CPT | Performed by: FAMILY MEDICINE

## 2025-07-30 PROCEDURE — 82043 UR ALBUMIN QUANTITATIVE: CPT | Performed by: FAMILY MEDICINE

## 2025-07-30 PROCEDURE — 80061 LIPID PANEL: CPT | Performed by: PATHOLOGY

## 2025-07-30 RX ORDER — LOSARTAN POTASSIUM 25 MG/1
25 TABLET ORAL DAILY
Qty: 90 TABLET | Refills: 1 | Status: SHIPPED | OUTPATIENT
Start: 2025-07-30

## 2025-07-30 ASSESSMENT — PAIN SCALES - GENERAL: PAINLEVEL_OUTOF10: NO PAIN (0)

## 2025-07-30 NOTE — NURSING NOTE
Chief Complaint   Patient presents with    Follow Up     Return CORE; 74 year old with chronic systolic heart failure presents for follow up with labs prior     Vitals were taken and medications reconciled.    Efrain Bo, JOSEF  1:39 PM

## 2025-07-30 NOTE — PATIENT INSTRUCTIONS
Take your medicines every day, as directed     Changes made today:    - Start losartan 25 mg daily    Monitor Your Weight and Symptoms     Contact us if you:     Gain 2 pounds in one day or 5 pounds in one week  Feel more short of breath  Notice more leg swelling  Feel lightheadeded    Change your lifestyle     Limit Salt or Sodium:  2000 mg  Limit Fluids:  2000 mL or approximately 64 ounces  Eat a Heart Healthy Diet  Low in saturated fats  Stay Active:  Aim to move at least 150 minutes every  week            To Contact us     During Business Hours:  [phone], option # 1      After hours, weekends or holidays:   [phone], Option #4  Ask to speak to the On-Call Cardiologist. Inform them you are a CORE/heart failure patient at the [facility].       Use [employer] allows you to communicate directly with your heart team through secure messaging.  [employer] can be accessed any time on your phone, computer, or tablet.  If you need assistance, we'd be happy to help!             Keep your Heart Appointments:     - Labs (BMP) in 2 weeks (okay to do when you get your ECHO)    - ECHO as scheduled 8/17/25    - RN phone call in 2 weeks to check on BPs    - EP as scheduled 8/20/25    - CORE clinic in 3 months        Please consider attending our virtual support group which is held monthly. Please reach out to Olvin at [phone] for more information if you are interested in attending.

## 2025-07-30 NOTE — LETTER
7/30/2025      RE: Kayli Morillo  [address]  [facility] [address]       Dear Colleague,    Thank you for the opportunity to participate in the care of your patient, Kayli Morillo, at the [facility] [place] at [facility]. Please see a copy of my visit note below.    HPI:   Ms. Morillo is a 74 year old female with a past medical history including HFrEF 2/2 NICM, nonobstructive CAD from 3/2023 invasive coronary angiogram, and T2DM who presents for evaluation of chronic systolic heart failure.    She was seen by Dr. Schilling on 8/27/24. She was doing very well. Her metoprolol was increased. EF had improved from 15-20% to 35-40%.    Today  She has been feeling well since she saw Dr. Schilling last August- almost one year ago. 6 weeks ago she had an episode of pre-syncope and she was feeling pretty fatigued- that resolved and since then has been feeling back to baseline. She thinks she had missed a few days of her medications around that time. She has been feeling better since going back on the medications.      No SOB at rest.  Stable exercise tolerance. She has been walking to the mailbox and using a seated peddler for about 5 minutes at least once a day and sometimes twice a day. No LE edema (exam is slightly different) or abdominal edema. Her orthopnea is resolved. No more PND. No palpitations. No chest pain.     She has been off entresto for close to a year- she was on patient assistance for a time and she no longer qualifies.     At home her blood pressure has been 104//80s.    Weights has been around 130 lbs at home.     Cardiac Medications  Aldactone 25 mg daily  Metoprolol succinate 50 mg daily  Entresto 49-51 mg BID- hasn't been taking for about one year  Lipitor 20 mg daily  ASA 81 mg daily    PAST MEDICAL HISTORY:  Past Medical History:   Diagnosis Date     Chronic systolic heart failure (H)      Congestive heart failure (H) 3/2023      DM2 (diabetes mellitus, type 2) (H)      Gastroesophageal reflux disease without esophagitis      Osteopenia      Paroxysmal supraventricular tachycardia 3/2023       FAMILY HISTORY:  Family History   Problem Relation Age of Onset     Diabetes Mother      Cerebrovascular Disease Father      Alzheimer Disease Father        SOCIAL HISTORY:  Social History     Socioeconomic History     Marital status:    Tobacco Use     Smoking status: Never     Passive exposure: Past     Smokeless tobacco: Never       CURRENT MEDICATIONS:  Current Outpatient Medications   Medication Sig Dispense Refill     aspirin (ASA) 81 MG chewable tablet        atorvastatin (LIPITOR) 20 MG tablet Take 1 tablet (20 mg) by mouth daily. 90 tablet 3     blood glucose (NO BRAND SPECIFIED) test strip Use to test blood sugar 1 times daily or as directed. To accompany: Blood Glucose Monitor Brands: per insurance. 100 strip 6     blood glucose monitoring (NO BRAND SPECIFIED) meter device kit Use to test blood sugar 1 times daily or as directed. Preferred blood glucose meter OR supplies to accompany: Blood Glucose Monitor Brands: per insurance. 1 kit 0     Calcium Carb-Cholecalciferol 500-10 MG-MCG TABS        losartan (COZAAR) 25 MG tablet Take 1 tablet (25 mg) by mouth daily. 90 tablet 1     metFORMIN (GLUCOPHAGE XR) 500 MG 24 hr tablet Take 1 tablet (500 mg) by mouth 2 times daily (with meals). 180 tablet 3     metFORMIN (GLUCOPHAGE) 500 MG tablet 2 times daily (with meals)       metoprolol succinate ER (TOPROL XL) 50 MG 24 hr tablet Take 1 tablet (50 mg) by mouth daily. 90 tablet 3     Multiple Vitamins-Minerals (ICAPS AREDS 2 PO)        spironolactone (ALDACTONE) 25 MG tablet Take 1 tablet (25 mg) by mouth daily. 90 tablet 3     thin (NO BRAND SPECIFIED) lancets Use with lancing device to check glucose 1 times daily per  direction. To accompany: Blood Glucose Monitor Brands: per insurance. 100 each 6     acetaminophen-codeine  "(TYLENOL #3) 300-30 MG per tablet Take 1 tablet by mouth every 4 hours as needed for moderate pain 12 tablet 0     No current facility-administered medications for this visit.       ROS:   Refer to HPI    EXAM:  /69 (BP Location: Right arm, Patient Position: Chair, Cuff Size: Adult Regular)   Pulse 72   Wt 59.9 kg (132 lb 1.6 oz)   SpO2 97%   BMI 26.68 kg/m    GENERAL: Appears comfortable, in no acute distress.   HEENT: Eye symmetrical, no discharge or icterus bilaterally.   CV: RRR, +S1S2, no murmur, rub, or gallop. JVP <6.   RESPIRATORY: Respirations regular, even, and unlabored. Lungs CTA throughout.   GI: Soft and non distended   EXTREMITIES: Trace b/l lower extremity peripheral edema. All extremities are warm and well perfused  NEUROLOGIC: Alert and interacting appropriately.   SKIN: No jaundice.      Labs, reviewed with patient in clinic today:  CBC RESULTS:  Lab Results   Component Value Date    WBC 6.1 06/23/2025    RBC 4.80 06/23/2025    HGB 14.7 06/23/2025    HCT 44.3 06/23/2025    MCV 92 06/23/2025    MCH 30.6 06/23/2025    MCHC 33.2 06/23/2025    RDW 12.3 06/23/2025     06/23/2025       CMP RESULTS:  Lab Results   Component Value Date     07/30/2025    POTASSIUM 4.6 07/30/2025    CHLORIDE 102 07/30/2025    CHLORIDE 104.3 12/18/2023    CO2 24 07/30/2025    ANIONGAP 13 07/30/2025     (H) 07/30/2025     (H) 03/31/2023    BUN 19.6 07/30/2025    CR 0.94 07/30/2025    GFRESTIMATED 63 07/30/2025    JULIANNE 11.0 (H) 07/30/2025    BILITOTAL 0.5 06/23/2025    ALBUMIN 3.9 06/23/2025    ALKPHOS 111 06/23/2025    ALT 18 06/23/2025    AST 23 06/23/2025        INR RESULTS:  Lab Results   Component Value Date    INR 1.01 03/31/2023       No results found for: \"MAG\"  No results found for: \"NTBNPI\"  Lab Results   Component Value Date    NTBNP 236 06/23/2025       Diagnostics:   10/17/23 ECHO  Interpretation Summary  Mild left ventricular dilation is present. Left ventricular function " is  decreased. The ejection fraction is 30-35% (moderately reduced). Abnormal  septal motion consistent with left bundle branch block is present. Moderate  diffuse hypokinesis is present.  Global right ventricular function is normal.  Mild to moderate mitral insufficiency is present.  Pulmonary artery systolic pressure is normal.  Estimated mean right atrial pressure is normal.  No pericardial effusion is present.    5/25/23 cMRI  1. The left ventricle is severely dilated. There is increased trabeculations in the left ventricle. There  is severe global hypokinesis. The systolic function severely reduced. The LVEF is traced at 19%.      2. The right ventricle is normal in cavity size. The global systolic function is normal. The RVEF is 51%.      3. The left atrium is severely enlarged. The right atrium is normal in size.      4. There is at least moderate functional mitral regurgitation.     5. There is mild hyperenhancement in the basal septum in the pattern of mid-myocardial stripe consistent  with non ischemic fibrosis.    T1 mapping - ECV 33% (elevated).   T2 mapping - no myocardial edema present.      6. There is trivial pericardial effusion.     7. There is no intracardiac thrombus.     CONCLUSIONS:   Non-ischemic cardiomyopathy with severe LV dysfunction and normal right ventricular function, LVEF of 19%  and RVEF 51%.  No evidence of myocardial edema.      CORE EXAM    3/16/23 ECHO  Interpretation Summary  Moderate left ventricular dilation (LVIDd 6.1cm). Severe diffuse hypokinesis.  Biplane LVEF is 14.3%.  The right ventricle is normal size and function.  Moderate mitral insufficiency is present due to dilated left ventricle.  Moderate pulmonary hypertension. Right ventricular systolic pressure is 52  mmHg.  The inferior vena cava was normal in size with preserved respiratory  variability.     There is no prior study for direct comparison. Dr. Quarles informed of the  Findings.    3/31/23 Coronary angiogram  and R[facility]  Diagnostic  Dominance: Right  Left Main   The vessel was visualized by selective angiography, is moderate in size and is angiographically normal.      Left Anterior Descending   The vessel is moderate in size.   Prox LAD lesion is 20% stenosed. The lesion is focal.   Prox LAD to Mid LAD lesion is 25% stenosed with 65% stenosed side branch in 1st Diag.      First Diagonal Branch   The vessel is moderate in size.      First Septal Branch   The vessel is small and is angiographically normal.      Second Diagonal Branch   The vessel is small.      Second Septal Branch   The vessel is small and is angiographically normal.      Third Diagonal Branch   The vessel is small and is angiographically normal.      Left Circumflex   The vessel is moderate in size and is angiographically normal.      First Obtuse Marginal Branch   The vessel is small and is angiographically normal.      Second Obtuse Marginal Branch   The vessel is moderate in size.   2nd Mrg lesion is 40% stenosed.      Lateral Second Obtuse Marginal Branch   The vessel is moderate in size and is angiographically normal.      Third Obtuse Marginal Branch   The vessel is moderate in size and is angiographically normal.      Fourth Obtuse Marginal Branch   The vessel is small and is angiographically normal.      Right Coronary Artery   The vessel was visualized by selective angiography and is moderate in size.   Mid RCA lesion is 20% stenosed.      Acute Marginal Branch   The vessel is moderate in size and is angiographically normal.      Right Ventricular Branch   The vessel is small and is angiographically normal.      Right Posterior Descending Artery   The vessel is moderate in size and is angiographically normal.      Right Posterior Atrioventricular Artery   The vessel is small and is angiographically normal.      NIBP 117/56/78  RA --/--/3  RV 42/3  PA 42/14/24  PCWP --/--/10  PA Sat 62%  West CO/CI 3.5/2.2  TD CO/CI 3.6/2.3  SVR 1444 (TD)  dynes  PVR 3.6 (TD) AMOR     Right sided filling pressures are normal. Left sided filling pressures are normal. Mild elevated pulmonary hypertension. Normal cardiac output level. Hemodynamic data has been modified in Epic per physician review.      Assessment and Plan:   Ms. Morillo is a 74 year old female with a past medical history including HFrEF 2/2 NICM, nonobstructive CAD from 3/2023 invasive coronary angiogram, and T2DM who presents for evaluation of chronic systolic heart failure.    Symptomatically, she is doing okay. She has been of ARNI for almost one year d/t cost reasons. We will start losartan today and titrate as possible. She is getting an echo in a few weeks    # Chronic systolic heart failure/HFrEF secondary to NICM. EF bertha of 15-20%, improved to 35-40% as of 7/18/24  Stage C. NYHA Class III.    Fluid status: euvolemic, off loop diuretics   ACEi/ARB/ARNI: off entresto d/t cost, start losartan 25 mg daily  BB: metoprolol succinate to 50 mg daily, ongoing titration, but will focus on losartn today  Aldosterone: aldactone 25 mg daily   SGLT2i- cost prohibitive- has tried applying to patient assistance in the past as well but does not qualify  SCD prophylaxis: EF >35%  NSAID use: contraindicated  Sleep apnea evaluation: referred to sleep clinic previously (outside referall)  Remote monitoring: Would consider in the future if more volume concerns, currently I don't think this is needed    # T2DM  - Last A1C was 14 from 7 the year prior- she is working on making significant dietary changes  - Will need to communicate with her PCP if we are able to add an SGLT2i (she is on metformin). Currently cost prohibitive.    # Non-obstructive coronary artery disease as per 2023 angiogram  - Continue ASA and lipitor    # CKD stage 2  - Cr 0.94, stable with recent b/l    # Hypercalcermia  - PCP has already reached out d/t elevation and are planning on work-up    Follow up   - BMP in 2 weeks- started entresto  - ECHO  in 2 week as scheduled  - RN phone call in 2 weeks, plan to increase losartan if possible  - EP as scheduled 8/20/25  - CORE in 3 months    Billing  - I managed 2+ stable chronic conditions  - I changed a prescription medication    The longitudinal plan of care for the diagnosis(es)/condition(s) as documented were addressed during this visit. Due to the added complexity in care, I will continue to support Kalyi in the subsequent management and with ongoing continuity of care.        Eden Pugh PA-C  [facility]          CC  JOSHUA, ANA PERKINS    Please do not hesitate to contact me if you have any questions/concerns.     Sincerely,     Eden Pugh PA-C

## 2025-08-05 ENCOUNTER — LAB (OUTPATIENT)
Dept: LAB | Facility: CLINIC | Age: 75
End: 2025-08-05
Payer: MEDICARE

## 2025-08-05 DIAGNOSIS — E83.52 HYPERCALCEMIA: ICD-10-CM

## 2025-08-05 LAB
BASOPHILS # BLD AUTO: 0 10E3/UL (ref 0–0.2)
BASOPHILS NFR BLD AUTO: 1 %
CALCIUM SERPL-MCNC: 9.8 MG/DL (ref 8.8–10.4)
EOSINOPHIL # BLD AUTO: 0.2 10E3/UL (ref 0–0.7)
EOSINOPHIL NFR BLD AUTO: 3 %
ERYTHROCYTE [DISTWIDTH] IN BLOOD BY AUTOMATED COUNT: 11.9 % (ref 10–15)
HCT VFR BLD AUTO: 42.8 % (ref 35–47)
HGB BLD-MCNC: 14.1 G/DL (ref 11.7–15.7)
IMM GRANULOCYTES # BLD: 0 10E3/UL
IMM GRANULOCYTES NFR BLD: 0 %
LYMPHOCYTES # BLD AUTO: 1.9 10E3/UL (ref 0.8–5.3)
LYMPHOCYTES NFR BLD AUTO: 29 %
MCH RBC QN AUTO: 31 PG (ref 26.5–33)
MCHC RBC AUTO-ENTMCNC: 32.9 G/DL (ref 31.5–36.5)
MCV RBC AUTO: 94 FL (ref 78–100)
MONOCYTES # BLD AUTO: 0.3 10E3/UL (ref 0–1.3)
MONOCYTES NFR BLD AUTO: 5 %
NEUTROPHILS # BLD AUTO: 4.1 10E3/UL (ref 1.6–8.3)
NEUTROPHILS NFR BLD AUTO: 63 %
PLATELET # BLD AUTO: 227 10E3/UL (ref 150–450)
PROT SERPL-MCNC: 6.4 G/DL (ref 6.4–8.3)
PTH-INTACT SERPL-MCNC: 30 PG/ML (ref 15–65)
RBC # BLD AUTO: 4.55 10E6/UL (ref 3.8–5.2)
VIT D+METAB SERPL-MCNC: 44 NG/ML (ref 20–50)
WBC # BLD AUTO: 6.5 10E3/UL (ref 4–11)

## 2025-08-05 PROCEDURE — 83970 ASSAY OF PARATHORMONE: CPT

## 2025-08-05 PROCEDURE — 82310 ASSAY OF CALCIUM: CPT

## 2025-08-05 PROCEDURE — 84165 PROTEIN E-PHORESIS SERUM: CPT | Performed by: PATHOLOGY

## 2025-08-05 PROCEDURE — 84155 ASSAY OF PROTEIN SERUM: CPT

## 2025-08-05 PROCEDURE — 82306 VITAMIN D 25 HYDROXY: CPT

## 2025-08-05 PROCEDURE — 36415 COLL VENOUS BLD VENIPUNCTURE: CPT

## 2025-08-05 PROCEDURE — 85025 COMPLETE CBC W/AUTO DIFF WBC: CPT

## 2025-08-06 ENCOUNTER — PATIENT OUTREACH (OUTPATIENT)
Dept: CARE COORDINATION | Facility: CLINIC | Age: 75
End: 2025-08-06
Payer: MEDICARE

## 2025-08-06 ENCOUNTER — RESULTS FOLLOW-UP (OUTPATIENT)
Dept: INTERNAL MEDICINE | Facility: CLINIC | Age: 75
End: 2025-08-06
Payer: MEDICARE

## 2025-08-06 LAB
ALBUMIN SERPL ELPH-MCNC: 3.7 G/DL (ref 3.7–5.1)
ALPHA1 GLOB SERPL ELPH-MCNC: 0.3 G/DL (ref 0.2–0.4)
ALPHA2 GLOB SERPL ELPH-MCNC: 0.7 G/DL (ref 0.5–0.9)
B-GLOBULIN SERPL ELPH-MCNC: 0.8 G/DL (ref 0.6–1)
GAMMA GLOB SERPL ELPH-MCNC: 0.8 G/DL (ref 0.7–1.6)
M PROTEIN SERPL ELPH-MCNC: 0.1 G/DL
PROT PATTERN SERPL ELPH-IMP: ABNORMAL

## (undated) DEVICE — PACK HEART LEFT CUSTOM

## (undated) DEVICE — Device

## (undated) DEVICE — 9FR X 13CM SAFESHEATH II TEAR-AWAY VALVED SHEATH SYSTEM, WITH SIDE PORT, 0.038IN GUIDEWIRE, 19.5CM DILATOR

## (undated) DEVICE — ELECTRODE DEFIB CADENCE 22550R

## (undated) DEVICE — SLEEVE TR BAND RADIAL COMPRESSION DEVICE 24CM TRB24-REG

## (undated) DEVICE — KIT HAND CONTROL ACIST 016795

## (undated) DEVICE — KIT MICROINTRODUCER VASCULAR VSI 4FRX0.018INX40CM 7195X

## (undated) DEVICE — INTRO SHEATH 7FRX10CM PINNACLE RSS702

## (undated) DEVICE — MANIFOLD KIT ANGIO AUTOMATED 014613

## (undated) DEVICE — 7FR X 13CM SAFESHEATH II TEAR-AWAY VALVED SHEATH SYSTEM, WITH SIDE PORT, 0.038IN GUIDEWIRE, 19.5CM DILATOR

## (undated) DEVICE — FASTENER CATH BALLOON CLAMPX2 STATLOCK 0684-00-493

## (undated) DEVICE — TUBING PRESSURE 30"

## (undated) DEVICE — INTRO GLIDESHEATH SLENDER 6FR 10X45CM 60-1060

## (undated) DEVICE — CABLE PACING ALLIGATOR CLIP 12FT 5833SL

## (undated) RX ORDER — LIDOCAINE 40 MG/G
CREAM TOPICAL
Status: DISPENSED
Start: 2023-03-31

## (undated) RX ORDER — CEFAZOLIN SODIUM 1 G/3ML
INJECTION, POWDER, FOR SOLUTION INTRAMUSCULAR; INTRAVENOUS
Status: DISPENSED
Start: 2024-04-16

## (undated) RX ORDER — LIDOCAINE HYDROCHLORIDE 10 MG/ML
INJECTION, SOLUTION EPIDURAL; INFILTRATION; INTRACAUDAL; PERINEURAL
Status: DISPENSED
Start: 2023-03-31

## (undated) RX ORDER — NITROGLYCERIN 5 MG/ML
VIAL (ML) INTRAVENOUS
Status: DISPENSED
Start: 2023-03-31

## (undated) RX ORDER — FENTANYL CITRATE 50 UG/ML
INJECTION, SOLUTION INTRAMUSCULAR; INTRAVENOUS
Status: DISPENSED
Start: 2023-03-31

## (undated) RX ORDER — SODIUM CHLORIDE 9 MG/ML
INJECTION, SOLUTION INTRAVENOUS
Status: DISPENSED
Start: 2023-03-31

## (undated) RX ORDER — HEPARIN SODIUM 1000 [USP'U]/ML
INJECTION, SOLUTION INTRAVENOUS; SUBCUTANEOUS
Status: DISPENSED
Start: 2023-03-31

## (undated) RX ORDER — FENTANYL CITRATE 50 UG/ML
INJECTION, SOLUTION INTRAMUSCULAR; INTRAVENOUS
Status: DISPENSED
Start: 2024-04-16

## (undated) RX ORDER — NICARDIPINE HCL-0.9% SOD CHLOR 1 MG/10 ML
SYRINGE (ML) INTRAVENOUS
Status: DISPENSED
Start: 2023-03-31

## (undated) RX ORDER — ASPIRIN 81 MG/1
TABLET, CHEWABLE ORAL
Status: DISPENSED
Start: 2023-03-31

## (undated) RX ORDER — SODIUM CHLORIDE 9 MG/ML
INJECTION, SOLUTION INTRAVENOUS
Status: DISPENSED
Start: 2024-04-16

## (undated) RX ORDER — ASPIRIN 325 MG
TABLET ORAL
Status: DISPENSED
Start: 2023-03-31